# Patient Record
Sex: MALE | Race: WHITE | NOT HISPANIC OR LATINO | Employment: OTHER | URBAN - NONMETROPOLITAN AREA
[De-identification: names, ages, dates, MRNs, and addresses within clinical notes are randomized per-mention and may not be internally consistent; named-entity substitution may affect disease eponyms.]

---

## 2017-01-18 ENCOUNTER — OFFICE VISIT (OUTPATIENT)
Dept: FAMILY MEDICINE CLINIC | Facility: CLINIC | Age: 77
End: 2017-01-18

## 2017-01-18 VITALS
DIASTOLIC BLOOD PRESSURE: 76 MMHG | SYSTOLIC BLOOD PRESSURE: 126 MMHG | WEIGHT: 214.5 LBS | HEIGHT: 69 IN | BODY MASS INDEX: 31.77 KG/M2

## 2017-01-18 DIAGNOSIS — I10 ESSENTIAL HYPERTENSION: Primary | ICD-10-CM

## 2017-01-18 DIAGNOSIS — I25.10 CORONARY ARTERIOSCLEROSIS: ICD-10-CM

## 2017-01-18 LAB
ANION GAP SERPL CALCULATED.3IONS-SCNC: 8 MMOL/L (ref 5–15)
BUN SERPL-MCNC: 24 MG/DL (ref 8–25)
CALCIUM SERPL-MCNC: 9.8 MG/DL (ref 8.4–10.8)
CHLORIDE SERPL-SCNC: 101 MMOL/L (ref 100–112)
CO2 SERPL-SCNC: 26 MMOL/L (ref 20–32)
CREAT SERPL-MCNC: 1.2 MG/DL (ref 0.4–1.3)
GLUCOSE SERPL-MCNC: 100 MG/DL (ref 70–100)
POTASSIUM SERPL-SCNC: 4.2 MMOL/L (ref 3.4–5.4)
SODIUM SERPL-SCNC: 135 MMOL/L (ref 134–146)

## 2017-01-18 PROCEDURE — 99214 OFFICE O/P EST MOD 30 MIN: CPT | Performed by: FAMILY MEDICINE

## 2017-01-18 RX ORDER — ASPIRIN 81 MG/1
81 TABLET ORAL DAILY
COMMUNITY

## 2017-01-18 RX ORDER — ISOSORBIDE MONONITRATE 60 MG/1
60 TABLET, EXTENDED RELEASE ORAL
COMMUNITY
End: 2017-05-15 | Stop reason: SDUPTHER

## 2017-01-18 RX ORDER — ATENOLOL 50 MG/1
50 TABLET ORAL
COMMUNITY
End: 2017-02-14 | Stop reason: SDUPTHER

## 2017-01-18 RX ORDER — ENALAPRIL MALEATE 5 MG/1
5 TABLET ORAL
COMMUNITY
End: 2017-05-18

## 2017-01-18 RX ORDER — SIMVASTATIN 80 MG
80 TABLET ORAL
COMMUNITY
End: 2017-05-15 | Stop reason: SDUPTHER

## 2017-01-18 RX ORDER — PANTOPRAZOLE SODIUM 40 MG/1
40 TABLET, DELAYED RELEASE ORAL NIGHTLY
Refills: 2 | COMMUNITY
Start: 2016-10-24 | End: 2017-07-17 | Stop reason: SDUPTHER

## 2017-01-18 RX ORDER — PRASUGREL 10 MG/1
10 TABLET, FILM COATED ORAL DAILY
COMMUNITY
End: 2017-12-04

## 2017-01-18 RX ORDER — RANOLAZINE 500 MG/1
500 TABLET, EXTENDED RELEASE ORAL
COMMUNITY
End: 2017-02-08 | Stop reason: SDUPTHER

## 2017-01-18 RX ORDER — FAMOTIDINE 40 MG/1
40 TABLET, FILM COATED ORAL NIGHTLY PRN
COMMUNITY
End: 2018-01-08 | Stop reason: SDUPTHER

## 2017-01-18 NOTE — MR AVS SNAPSHOT
Dale Mann   1/18/2017 10:15 AM   Office Visit    Dept Phone:  [phone]   Encounter #:  [id number]    Provider:  Dunia Cummings MD   Department:  [facility]                Your Full Care Plan              Your Updated Medication List          This list is accurate as of: 1/18/17 10:52 AM.  Always use your most recent med list.                aspirin 81 MG EC tablet       atenolol 50 MG tablet   Commonly known as:  TENORMIN       EFFIENT 10 MG tablet   Generic drug:  prasugrel       enalapril 5 MG tablet   Commonly known as:  VASOTEC       famotidine 40 MG tablet   Commonly known as:  PEPCID       isosorbide mononitrate 60 MG 24 hr tablet   Commonly known as:  IMDUR       NIACIN PO       NITROSTAT 0.4 MG SL tablet   Generic drug:  nitroglycerin   PLACE 1 TABLET UNDER TONGUE EVERY 5 MINUTES FOR 3 DOSES AS NEEDED FOR CHEST PAIN. IF NO RELIEF WITH 15 MINUTES-CALL 911 OR GO TO ER       pantoprazole 40 MG EC tablet   Commonly known as:  PROTONIX       RANEXA 500 MG 12 hr tablet   Generic drug:  ranolazine       simvastatin 80 MG tablet   Commonly known as:  ZOCOR               We Performed the Following     Basic Metabolic Panel       You Were Diagnosed With        Codes Comments    Essential hypertension    -  Primary ICD-10-CM: I10  ICD-9-CM: 401.9       Instructions     None    Patient Instructions History      Upcoming Appointments     Visit Type Date Time Department    FOLLOW UP 1/18/2017 10:15 AM W JOSE Baxter Signup     Our records indicate that you have declined [facility] signup. If you would like to sign up for [employer], please email [email or web address] or call [phone] to obtain an activation code.             Other Info from Your Visit           Allergies     Coumadin [Warfarin Sodium]      Lipitor [Atorvastatin]      Plavix [Clopidogrel Bisulfate]        Reason for Visit     Follow-up  "vanderbuilt      Vital Signs     Blood Pressure Height Weight Body Mass Index Smoking Status       126/76 69\" (175.3 cm) 214 lb 8 oz (97.3 kg) 31.68 kg/m2 Never Smoker       Problems and Diagnoses Noted     Chronic anemia    Inflammation of the stomach lining    Hardening of the arteries of the heart    Diverticulosis of sigmoid colon    Difficult or painful urination    High blood pressure    Fever    Hematochezia    History of peptic ulcer    Hyperkalemia    Old myocardial infarction    Other specified anemias    Screening for prostate cancer    Upper respiratory infection    Urinary tract infection        "

## 2017-01-18 NOTE — PROGRESS NOTES
Subjective   Dale Mann is a 76 y.o. male.  He is here for follow-up on cardiac disease.  He came into the office recently with chest pain and was sent to ER and from there was transferred to [facility].  He was kept and seen by cardiology that had to be readmitted and at this point was transferred to [facility] and received a stent in one of his coronary arteries.  He was told that he did have a mild heart attack.  He is feeling better, still has some soreness in the chest and a bit of shortness of breath but is starting to feel like he is getting his energy back and is more able to get around and do things  His blood pressure was running while in the lisinopril was held.  He was supposed to have his renal functions rechecked by me and for us to decide whether he needs to go back on the enalapril at this time.  He still having some low blood pressures and some lightheadedness when he stands up    History of Present Illness     The following portions of the patient's history were reviewed and updated as appropriate: allergies, current medications, past family history, past medical history, past social history, past surgical history and problem list.    Review of Systems   Constitutional: Negative for activity change, appetite change, diaphoresis, fatigue, fever and unexpected weight change.   HENT: Negative for congestion, ear pain, hearing loss, sinus pressure, sore throat, tinnitus, trouble swallowing and voice change.    Eyes: Negative.    Respiratory: Negative.    Cardiovascular: Negative.    Gastrointestinal: Negative for abdominal distention, abdominal pain, blood in stool, constipation, diarrhea, nausea and vomiting.   Endocrine: Negative.    Genitourinary: Negative for decreased urine volume, dysuria, frequency, hematuria and urgency.   Musculoskeletal: Negative.    Skin: Negative.    Allergic/Immunologic: Negative.    Neurological: Negative for dizziness, tremors, seizures, syncope, speech  difficulty, weakness, numbness and headaches.   Hematological: Negative.    Psychiatric/Behavioral: Negative for dysphoric mood and sleep disturbance. The patient is not nervous/anxious.        Objective   Physical Exam   Constitutional: He is oriented to person, place, and time. He appears well-developed and well-nourished. No distress.   HENT:   Head: Normocephalic and atraumatic.   Nose: Nose normal.   Mouth/Throat: Oropharynx is clear and moist.   Eyes: Conjunctivae and EOM are normal. Pupils are equal, round, and reactive to light.   Neck: Normal range of motion. Neck supple. No JVD present. No tracheal deviation present. No thyromegaly present.   Cardiovascular: Normal rate, regular rhythm, normal heart sounds and intact distal pulses.    No murmur heard.  Pulmonary/Chest: Effort normal and breath sounds normal. He has no wheezes. He exhibits no tenderness.   Abdominal: Soft. Bowel sounds are normal. He exhibits no distension and no mass. There is no tenderness.   Musculoskeletal: Normal range of motion. He exhibits no edema or tenderness.   Lymphadenopathy:     He has no cervical adenopathy.   Neurological: He is alert and oriented to person, place, and time. He exhibits normal muscle tone. Coordination normal.   Skin: Skin is warm and dry. No rash noted. No erythema. No pallor.   Psychiatric: He has a normal mood and affect.   Nursing note and vitals reviewed.      Assessment/Plan   Dale was seen today for follow-up.    Diagnoses and all orders for this visit:    Essential hypertension  -     Basic Metabolic Panel    Coronary arteriosclerosis   for the time being I will leave him off the enalapril given his low blood pressures and symptoms and we'll go ahead and check a metabolic panel in case we need to add it back    Follow-up with cardiology, new medication changes are noted

## 2017-02-08 RX ORDER — RANOLAZINE 500 MG/1
500 TABLET, EXTENDED RELEASE ORAL 2 TIMES DAILY
Qty: 60 TABLET | Refills: 5 | Status: SHIPPED | OUTPATIENT
Start: 2017-02-08 | End: 2017-10-02 | Stop reason: SDUPTHER

## 2017-02-14 RX ORDER — ATENOLOL 50 MG/1
TABLET ORAL
Qty: 30 TABLET | Refills: 6 | Status: SHIPPED | OUTPATIENT
Start: 2017-02-14 | End: 2017-05-18

## 2017-04-06 RX ORDER — NIACIN 1000 MG/1
TABLET, EXTENDED RELEASE ORAL
Qty: 90 TABLET | Refills: 2 | Status: SHIPPED | OUTPATIENT
Start: 2017-04-06 | End: 2017-05-18

## 2017-04-18 ENCOUNTER — OFFICE VISIT (OUTPATIENT)
Dept: FAMILY MEDICINE CLINIC | Facility: CLINIC | Age: 77
End: 2017-04-18

## 2017-04-18 ENCOUNTER — LAB (OUTPATIENT)
Dept: LAB | Facility: OTHER | Age: 77
End: 2017-04-18

## 2017-04-18 VITALS
DIASTOLIC BLOOD PRESSURE: 68 MMHG | WEIGHT: 205.2 LBS | SYSTOLIC BLOOD PRESSURE: 124 MMHG | HEIGHT: 69 IN | BODY MASS INDEX: 30.39 KG/M2

## 2017-04-18 DIAGNOSIS — N52.9 ERECTILE DYSFUNCTION, UNSPECIFIED ERECTILE DYSFUNCTION TYPE: Primary | ICD-10-CM

## 2017-04-18 DIAGNOSIS — B37.42 CANDIDAL BALANITIS: ICD-10-CM

## 2017-04-18 DIAGNOSIS — Z12.5 ENCOUNTER FOR SCREENING FOR MALIGNANT NEOPLASM OF PROSTATE: ICD-10-CM

## 2017-04-18 PROCEDURE — 84402 ASSAY OF FREE TESTOSTERONE: CPT | Performed by: FAMILY MEDICINE

## 2017-04-18 PROCEDURE — 84403 ASSAY OF TOTAL TESTOSTERONE: CPT | Performed by: FAMILY MEDICINE

## 2017-04-18 PROCEDURE — G0103 PSA SCREENING: HCPCS | Performed by: FAMILY MEDICINE

## 2017-04-18 PROCEDURE — 36415 COLL VENOUS BLD VENIPUNCTURE: CPT | Performed by: FAMILY MEDICINE

## 2017-04-18 PROCEDURE — 99213 OFFICE O/P EST LOW 20 MIN: CPT | Performed by: FAMILY MEDICINE

## 2017-04-18 RX ORDER — KETOCONAZOLE 20 MG/G
CREAM TOPICAL DAILY
Qty: 60 G | Refills: 5 | Status: SHIPPED | OUTPATIENT
Start: 2017-04-18 | End: 2017-08-29

## 2017-04-18 NOTE — PROGRESS NOTES
Subjective   Dale Mann is a 76 y.o. male.  He is here today concerned about a rash around the penis for the past 5 years or so.  He is uncircumcised and he reports that he tried over-the-counter medicines without success including steroid creams.  It gets cracked and dry peeling and is quite painful at times it seems that it swells.  He says at times it looks very red but he's not noticed any vesicular type lesions.    He also has trouble with erectile dysfunction.  He has trouble with both acquiring and maintaining an erection during sexual activity.    History of Present Illness     The following portions of the patient's history were reviewed and updated as appropriate: allergies, current medications, past family history, past medical history, past social history, past surgical history and problem list.    Review of Systems   Constitutional: Negative for activity change, appetite change, diaphoresis, fatigue, fever and unexpected weight change.   HENT: Negative for congestion, ear pain, hearing loss, sinus pressure, sore throat, tinnitus, trouble swallowing and voice change.    Eyes: Negative.    Respiratory: Negative.    Cardiovascular: Negative.    Gastrointestinal: Negative for abdominal distention, abdominal pain, blood in stool, constipation, diarrhea, nausea and vomiting.   Endocrine: Negative.    Genitourinary: Positive for penile pain and penile swelling. Negative for decreased urine volume, dysuria, frequency, hematuria and urgency.   Musculoskeletal: Negative.    Skin: Positive for rash.   Allergic/Immunologic: Negative.    Neurological: Negative for dizziness, tremors, seizures, syncope, speech difficulty, weakness, numbness and headaches.   Hematological: Negative.    Psychiatric/Behavioral: Negative for dysphoric mood and sleep disturbance. The patient is not nervous/anxious.        Objective   Physical Exam   Constitutional: He is oriented to person, place, and time. He appears  well-developed and well-nourished. No distress.   HENT:   Head: Normocephalic and atraumatic.   Nose: Nose normal.   Mouth/Throat: Oropharynx is clear and moist.   Eyes: Conjunctivae and EOM are normal. Pupils are equal, round, and reactive to light.   Neck: Normal range of motion. Neck supple. No JVD present. No tracheal deviation present. No thyromegaly present.   Cardiovascular: Normal rate, regular rhythm, normal heart sounds and intact distal pulses.    No murmur heard.  Pulmonary/Chest: Effort normal and breath sounds normal. He has no wheezes. He exhibits no tenderness.   Abdominal: Soft. Bowel sounds are normal. He exhibits no distension and no mass. There is no tenderness.   Genitourinary:   Genitourinary Comments: Patient has redness with cracked skin and mild swelling and inflammation at the tip of the penis and between the skin folds of the foreskin.   Musculoskeletal: Normal range of motion. He exhibits no edema or tenderness.   Lymphadenopathy:     He has no cervical adenopathy.   Neurological: He is alert and oriented to person, place, and time. He exhibits normal muscle tone. Coordination normal.   Skin: Skin is warm and dry. No rash noted. There is erythema. No pallor.   Psychiatric: He has a normal mood and affect.   Nursing note and vitals reviewed.      Assessment/Plan   Dale was seen today for rash.    Diagnoses and all orders for this visit:    Erectile dysfunction, unspecified erectile dysfunction type  -     Testosterone, Free, Total    Candidal balanitis  -     Ambulatory Referral to Urology    Encounter for screening for malignant neoplasm of prostate  -     PSA Screen (Medicare- Lab Only); Future    Other orders  -     ketoconazole (NIZORAL) 2 % cream; Apply  topically Daily.     we'll check testosterone levels and follow-up accordingly.  Before prescribing Viagra or similar product may need to speak with his cardiologist as he takes nitroglycerin on a fairly regular basis    We'll  refer to urology to see if he is a candidate for circumcision as he is wanting to see about getting this done and I think this would help with the infections that have been recurrent    Gave Nizoral cream for the balanitis

## 2017-04-19 LAB — PSA SERPL-MCNC: 0.87 NG/ML (ref 0–4)

## 2017-04-21 LAB
CONV COMMENT: ABNORMAL
TESTOST FREE SERPL-MCNC: 6.1 PG/ML (ref 6.6–18.1)
TESTOST SERPL-MCNC: 413 NG/DL (ref 348–1197)

## 2017-04-24 NOTE — PROGRESS NOTES
Please call the patient regarding his abnormal result.  Testosterone is low, see if he would like to start testosterone shots monthly

## 2017-05-04 ENCOUNTER — CLINICAL SUPPORT (OUTPATIENT)
Dept: FAMILY MEDICINE CLINIC | Facility: CLINIC | Age: 77
End: 2017-05-04

## 2017-05-04 DIAGNOSIS — E34.9 TESTOSTERONE DEFICIENCY: ICD-10-CM

## 2017-05-04 DIAGNOSIS — R79.89 DECREASED TESTOSTERONE LEVEL: Primary | ICD-10-CM

## 2017-05-04 PROCEDURE — 96372 THER/PROPH/DIAG INJ SC/IM: CPT | Performed by: FAMILY MEDICINE

## 2017-05-04 RX ORDER — TESTOSTERONE CYPIONATE 200 MG/ML
200 INJECTION, SOLUTION INTRAMUSCULAR
Status: COMPLETED | COMMUNITY
Start: 2017-05-04 | End: 2018-03-02

## 2017-05-04 RX ADMIN — TESTOSTERONE CYPIONATE 200 MG: 200 INJECTION, SOLUTION INTRAMUSCULAR at 15:30

## 2017-05-15 ENCOUNTER — PREP FOR SURGERY (OUTPATIENT)
Dept: UROLOGY | Facility: HOSPITAL | Age: 77
End: 2017-05-15

## 2017-05-15 DIAGNOSIS — N48.1 BALANITIS: Primary | ICD-10-CM

## 2017-05-15 RX ORDER — SIMVASTATIN 80 MG
TABLET ORAL
Qty: 90 TABLET | Refills: 2 | Status: SHIPPED | OUTPATIENT
Start: 2017-05-15 | End: 2017-05-18

## 2017-05-15 RX ORDER — LEVOFLOXACIN 5 MG/ML
500 INJECTION, SOLUTION INTRAVENOUS ONCE
Status: CANCELLED | OUTPATIENT
Start: 2017-05-19

## 2017-05-15 RX ORDER — ISOSORBIDE MONONITRATE 60 MG/1
TABLET, EXTENDED RELEASE ORAL
Qty: 135 TABLET | Refills: 2 | Status: SHIPPED | OUTPATIENT
Start: 2017-05-15 | End: 2017-05-18

## 2017-05-18 ENCOUNTER — APPOINTMENT (OUTPATIENT)
Dept: PREADMISSION TESTING | Facility: HOSPITAL | Age: 77
End: 2017-05-18

## 2017-05-18 VITALS
DIASTOLIC BLOOD PRESSURE: 68 MMHG | HEART RATE: 62 BPM | HEIGHT: 68 IN | BODY MASS INDEX: 31.83 KG/M2 | WEIGHT: 210 LBS | RESPIRATION RATE: 18 BRPM | OXYGEN SATURATION: 95 % | SYSTOLIC BLOOD PRESSURE: 130 MMHG

## 2017-05-18 DIAGNOSIS — N48.1 BALANITIS: ICD-10-CM

## 2017-05-18 LAB
BILIRUB UR QL STRIP: ABNORMAL
CLARITY UR: CLEAR
COLOR UR: ABNORMAL
GLUCOSE UR STRIP-MCNC: NEGATIVE MG/DL
HGB UR QL STRIP.AUTO: NEGATIVE
KETONES UR QL STRIP: NEGATIVE
LEUKOCYTE ESTERASE UR QL STRIP.AUTO: ABNORMAL
NITRITE UR QL STRIP: NEGATIVE
PH UR STRIP.AUTO: 7 [PH] (ref 5–9)
PROT UR QL STRIP: NEGATIVE
SP GR UR STRIP: 1.02 (ref 1–1.03)
UROBILINOGEN UR QL STRIP: ABNORMAL

## 2017-05-18 PROCEDURE — 93010 ELECTROCARDIOGRAM REPORT: CPT | Performed by: INTERNAL MEDICINE

## 2017-05-18 PROCEDURE — 93005 ELECTROCARDIOGRAM TRACING: CPT

## 2017-05-18 PROCEDURE — 81003 URINALYSIS AUTO W/O SCOPE: CPT | Performed by: UROLOGY

## 2017-05-18 RX ORDER — SODIUM CHLORIDE, SODIUM GLUCONATE, SODIUM ACETATE, POTASSIUM CHLORIDE, AND MAGNESIUM CHLORIDE 526; 502; 368; 37; 30 MG/100ML; MG/100ML; MG/100ML; MG/100ML; MG/100ML
1000 INJECTION, SOLUTION INTRAVENOUS CONTINUOUS PRN
Status: CANCELLED | OUTPATIENT
Start: 2017-05-19

## 2017-05-18 RX ORDER — SIMVASTATIN 40 MG
40 TABLET ORAL NIGHTLY
COMMUNITY
End: 2018-10-15 | Stop reason: DRUGHIGH

## 2017-05-18 RX ORDER — ISOSORBIDE MONONITRATE 60 MG/1
60 TABLET, EXTENDED RELEASE ORAL DAILY
COMMUNITY
End: 2018-03-16 | Stop reason: ALTCHOICE

## 2017-05-18 RX ORDER — NITROGLYCERIN 0.4 MG/1
0.4 TABLET SUBLINGUAL
COMMUNITY
End: 2019-03-25 | Stop reason: SDUPTHER

## 2017-05-18 RX ORDER — ATENOLOL 50 MG/1
50 TABLET ORAL DAILY
COMMUNITY
End: 2017-12-04 | Stop reason: ALTCHOICE

## 2017-05-23 ENCOUNTER — ANESTHESIA EVENT (OUTPATIENT)
Dept: PERIOP | Facility: HOSPITAL | Age: 77
End: 2017-05-23

## 2017-05-24 ENCOUNTER — HOSPITAL ENCOUNTER (OUTPATIENT)
Facility: HOSPITAL | Age: 77
Setting detail: HOSPITAL OUTPATIENT SURGERY
Discharge: HOME | End: 2017-05-24
Attending: UROLOGY | Admitting: UROLOGY

## 2017-05-24 ENCOUNTER — ANESTHESIA (OUTPATIENT)
Dept: PERIOP | Facility: HOSPITAL | Age: 77
End: 2017-05-24

## 2017-05-24 VITALS
HEIGHT: 68 IN | HEART RATE: 57 BPM | SYSTOLIC BLOOD PRESSURE: 102 MMHG | BODY MASS INDEX: 32.71 KG/M2 | TEMPERATURE: 97 F | OXYGEN SATURATION: 95 % | RESPIRATION RATE: 18 BRPM | WEIGHT: 215.83 LBS | DIASTOLIC BLOOD PRESSURE: 59 MMHG

## 2017-05-24 DIAGNOSIS — N48.1 BALANITIS: ICD-10-CM

## 2017-05-24 PROCEDURE — 88304 TISSUE EXAM BY PATHOLOGIST: CPT | Performed by: UROLOGY

## 2017-05-24 PROCEDURE — 25010000002 PROPOFOL 10 MG/ML EMULSION: Performed by: NURSE ANESTHETIST, CERTIFIED REGISTERED

## 2017-05-24 PROCEDURE — 25010000002 FENTANYL CITRATE (PF) 100 MCG/2ML SOLUTION: Performed by: NURSE ANESTHETIST, CERTIFIED REGISTERED

## 2017-05-24 PROCEDURE — 25010000002 MIDAZOLAM PER 1 MG: Performed by: NURSE ANESTHETIST, CERTIFIED REGISTERED

## 2017-05-24 PROCEDURE — 88304 TISSUE EXAM BY PATHOLOGIST: CPT | Performed by: PATHOLOGY

## 2017-05-24 PROCEDURE — 25010000003 CEFAZOLIN PER 500 MG: Performed by: NURSE ANESTHETIST, CERTIFIED REGISTERED

## 2017-05-24 RX ORDER — MIDAZOLAM HYDROCHLORIDE 1 MG/ML
INJECTION INTRAMUSCULAR; INTRAVENOUS AS NEEDED
Status: DISCONTINUED | OUTPATIENT
Start: 2017-05-24 | End: 2017-05-24 | Stop reason: SURG

## 2017-05-24 RX ORDER — LIDOCAINE HYDROCHLORIDE 20 MG/ML
INJECTION, SOLUTION INFILTRATION; PERINEURAL AS NEEDED
Status: DISCONTINUED | OUTPATIENT
Start: 2017-05-24 | End: 2017-05-24 | Stop reason: SURG

## 2017-05-24 RX ORDER — ACETAMINOPHEN 650 MG/1
650 SUPPOSITORY RECTAL ONCE AS NEEDED
Status: DISCONTINUED | OUTPATIENT
Start: 2017-05-24 | End: 2017-05-24 | Stop reason: HOSPADM

## 2017-05-24 RX ORDER — ACETAMINOPHEN 325 MG/1
650 TABLET ORAL ONCE AS NEEDED
Status: DISCONTINUED | OUTPATIENT
Start: 2017-05-24 | End: 2017-05-24 | Stop reason: HOSPADM

## 2017-05-24 RX ORDER — PROPOFOL 10 MG/ML
VIAL (ML) INTRAVENOUS AS NEEDED
Status: DISCONTINUED | OUTPATIENT
Start: 2017-05-24 | End: 2017-05-24 | Stop reason: SURG

## 2017-05-24 RX ORDER — LABETALOL HYDROCHLORIDE 5 MG/ML
5 INJECTION, SOLUTION INTRAVENOUS
Status: DISCONTINUED | OUTPATIENT
Start: 2017-05-24 | End: 2017-05-24 | Stop reason: HOSPADM

## 2017-05-24 RX ORDER — NALOXONE HCL 0.4 MG/ML
0.2 VIAL (ML) INJECTION AS NEEDED
Status: DISCONTINUED | OUTPATIENT
Start: 2017-05-24 | End: 2017-05-24 | Stop reason: HOSPADM

## 2017-05-24 RX ORDER — FENTANYL CITRATE 50 UG/ML
INJECTION, SOLUTION INTRAMUSCULAR; INTRAVENOUS AS NEEDED
Status: DISCONTINUED | OUTPATIENT
Start: 2017-05-24 | End: 2017-05-24 | Stop reason: SURG

## 2017-05-24 RX ORDER — CEFAZOLIN SODIUM 1 G/3ML
INJECTION, POWDER, FOR SOLUTION INTRAMUSCULAR; INTRAVENOUS AS NEEDED
Status: DISCONTINUED | OUTPATIENT
Start: 2017-05-24 | End: 2017-05-24 | Stop reason: SURG

## 2017-05-24 RX ORDER — ONDANSETRON 2 MG/ML
4 INJECTION INTRAMUSCULAR; INTRAVENOUS ONCE AS NEEDED
Status: DISCONTINUED | OUTPATIENT
Start: 2017-05-24 | End: 2017-05-24 | Stop reason: HOSPADM

## 2017-05-24 RX ORDER — FLUMAZENIL 0.1 MG/ML
0.2 INJECTION INTRAVENOUS AS NEEDED
Status: DISCONTINUED | OUTPATIENT
Start: 2017-05-24 | End: 2017-05-24 | Stop reason: HOSPADM

## 2017-05-24 RX ORDER — LEVOFLOXACIN 5 MG/ML
500 INJECTION, SOLUTION INTRAVENOUS ONCE
Status: DISCONTINUED | OUTPATIENT
Start: 2017-05-24 | End: 2017-05-24 | Stop reason: HOSPADM

## 2017-05-24 RX ORDER — DOXYCYCLINE HYCLATE 100 MG/1
100 CAPSULE ORAL DAILY
Qty: 7 CAPSULE | Refills: 0 | Status: SHIPPED | OUTPATIENT
Start: 2017-05-24 | End: 2017-05-31

## 2017-05-24 RX ORDER — SODIUM CHLORIDE, SODIUM GLUCONATE, SODIUM ACETATE, POTASSIUM CHLORIDE, AND MAGNESIUM CHLORIDE 526; 502; 368; 37; 30 MG/100ML; MG/100ML; MG/100ML; MG/100ML; MG/100ML
1000 INJECTION, SOLUTION INTRAVENOUS CONTINUOUS PRN
Status: DISCONTINUED | OUTPATIENT
Start: 2017-05-24 | End: 2017-05-24 | Stop reason: HOSPADM

## 2017-05-24 RX ORDER — DIPHENHYDRAMINE HYDROCHLORIDE 50 MG/ML
12.5 INJECTION INTRAMUSCULAR; INTRAVENOUS
Status: DISCONTINUED | OUTPATIENT
Start: 2017-05-24 | End: 2017-05-24 | Stop reason: HOSPADM

## 2017-05-24 RX ORDER — OXYCODONE AND ACETAMINOPHEN 7.5; 325 MG/1; MG/1
1-2 TABLET ORAL EVERY 4 HOURS PRN
Qty: 15 TABLET | Refills: 0 | Status: SHIPPED | OUTPATIENT
Start: 2017-05-24 | End: 2017-08-29

## 2017-05-24 RX ADMIN — PROPOFOL 100 MG: 10 INJECTION, EMULSION INTRAVENOUS at 16:52

## 2017-05-24 RX ADMIN — EPHEDRINE SULFATE 10 MG: 50 INJECTION INTRAMUSCULAR; INTRAVENOUS; SUBCUTANEOUS at 17:08

## 2017-05-24 RX ADMIN — EPHEDRINE SULFATE 10 MG: 50 INJECTION INTRAMUSCULAR; INTRAVENOUS; SUBCUTANEOUS at 17:05

## 2017-05-24 RX ADMIN — SODIUM CHLORIDE, SODIUM GLUCONATE, SODIUM ACETATE, POTASSIUM CHLORIDE, AND MAGNESIUM CHLORIDE 1000 ML: 526; 502; 368; 37; 30 INJECTION, SOLUTION INTRAVENOUS at 13:54

## 2017-05-24 RX ADMIN — LIDOCAINE HYDROCHLORIDE 80 MG: 20 INJECTION, SOLUTION INFILTRATION; PERINEURAL at 16:52

## 2017-05-24 RX ADMIN — FENTANYL CITRATE 50 MCG: 50 INJECTION, SOLUTION INTRAMUSCULAR; INTRAVENOUS at 16:52

## 2017-05-24 RX ADMIN — MIDAZOLAM 2 MG: 1 INJECTION INTRAMUSCULAR; INTRAVENOUS at 16:43

## 2017-05-24 RX ADMIN — CEFAZOLIN SODIUM 1 G: 1 INJECTION, POWDER, FOR SOLUTION INTRAMUSCULAR; INTRAVENOUS at 17:10

## 2017-05-24 RX ADMIN — FENTANYL CITRATE 50 MCG: 50 INJECTION, SOLUTION INTRAMUSCULAR; INTRAVENOUS at 17:10

## 2017-05-26 LAB
LAB AP CASE REPORT: NORMAL
Lab: NORMAL
PATH REPORT.FINAL DX SPEC: NORMAL
PATH REPORT.GROSS SPEC: NORMAL

## 2017-06-05 ENCOUNTER — CLINICAL SUPPORT (OUTPATIENT)
Dept: FAMILY MEDICINE CLINIC | Facility: CLINIC | Age: 77
End: 2017-06-05

## 2017-06-05 DIAGNOSIS — R79.89 DECREASED TESTOSTERONE LEVEL: Primary | ICD-10-CM

## 2017-06-05 PROCEDURE — 96372 THER/PROPH/DIAG INJ SC/IM: CPT | Performed by: FAMILY MEDICINE

## 2017-06-05 RX ADMIN — TESTOSTERONE CYPIONATE 200 MG: 200 INJECTION, SOLUTION INTRAMUSCULAR at 14:50

## 2017-07-03 ENCOUNTER — CLINICAL SUPPORT (OUTPATIENT)
Dept: FAMILY MEDICINE CLINIC | Facility: CLINIC | Age: 77
End: 2017-07-03

## 2017-07-03 DIAGNOSIS — R79.89 DECREASED TESTOSTERONE LEVEL: Primary | ICD-10-CM

## 2017-07-03 PROCEDURE — 96372 THER/PROPH/DIAG INJ SC/IM: CPT | Performed by: FAMILY MEDICINE

## 2017-07-03 RX ADMIN — TESTOSTERONE CYPIONATE 200 MG: 200 INJECTION, SOLUTION INTRAMUSCULAR at 15:04

## 2017-07-17 RX ORDER — PANTOPRAZOLE SODIUM 40 MG/1
TABLET, DELAYED RELEASE ORAL
Qty: 90 TABLET | Refills: 1 | Status: SHIPPED | OUTPATIENT
Start: 2017-07-17 | End: 2017-10-16 | Stop reason: SDUPTHER

## 2017-08-02 ENCOUNTER — CLINICAL SUPPORT (OUTPATIENT)
Dept: FAMILY MEDICINE CLINIC | Facility: CLINIC | Age: 77
End: 2017-08-02

## 2017-08-02 DIAGNOSIS — R79.89 DECREASED TESTOSTERONE LEVEL: Primary | ICD-10-CM

## 2017-08-02 PROCEDURE — 96372 THER/PROPH/DIAG INJ SC/IM: CPT | Performed by: FAMILY MEDICINE

## 2017-08-02 RX ADMIN — TESTOSTERONE CYPIONATE 200 MG: 200 INJECTION, SOLUTION INTRAMUSCULAR at 13:25

## 2017-08-14 ENCOUNTER — TRANSCRIBE ORDERS (OUTPATIENT)
Dept: CARDIOLOGY | Facility: CLINIC | Age: 77
End: 2017-08-14

## 2017-08-14 ENCOUNTER — TRANSCRIBE ORDERS (OUTPATIENT)
Dept: LAB | Facility: OTHER | Age: 77
End: 2017-08-14

## 2017-08-14 ENCOUNTER — LAB (OUTPATIENT)
Dept: LAB | Facility: OTHER | Age: 77
End: 2017-08-14

## 2017-08-14 ENCOUNTER — TRANSCRIBE ORDERS (OUTPATIENT)
Dept: GENERAL RADIOLOGY | Facility: CLINIC | Age: 77
End: 2017-08-14

## 2017-08-14 DIAGNOSIS — R07.9 CHEST PAIN, UNSPECIFIED: Primary | ICD-10-CM

## 2017-08-14 DIAGNOSIS — Z79.899 POLYPHARMACY: ICD-10-CM

## 2017-08-14 DIAGNOSIS — Z79.899 POLYPHARMACY: Primary | ICD-10-CM

## 2017-08-14 DIAGNOSIS — R07.9 CHEST PAIN, UNSPECIFIED TYPE: Primary | ICD-10-CM

## 2017-08-14 LAB
ALBUMIN SERPL-MCNC: 3.7 G/DL (ref 3.2–5.5)
ALP SERPL-CCNC: 58 U/L (ref 15–121)
ALT SERPL W P-5'-P-CCNC: 12 U/L (ref 10–60)
ANION GAP SERPL CALCULATED.3IONS-SCNC: 11 MMOL/L (ref 5–15)
AST SERPL-CCNC: 19 U/L (ref 10–60)
BASOPHILS # BLD AUTO: 0.01 10*3/MM3 (ref 0–0.2)
BASOPHILS NFR BLD AUTO: 0.2 % (ref 0–2)
BILIRUB CONJ SERPL-MCNC: 0.2 MG/DL (ref 0–0.1)
BILIRUB INDIRECT SERPL-MCNC: 0.8 MG/DL
BILIRUB SERPL-MCNC: 1 MG/DL (ref 0.2–1)
BUN BLD-MCNC: 17 MG/DL (ref 8–25)
BUN/CREAT SERPL: 14.2 (ref 7–25)
CALCIUM SPEC-SCNC: 8.9 MG/DL (ref 8.4–10.8)
CHLORIDE SERPL-SCNC: 102 MMOL/L (ref 100–112)
CHOLEST SERPL-MCNC: 111 MG/DL (ref 150–200)
CK SERPL-CCNC: 84 U/L (ref 26–140)
CO2 SERPL-SCNC: 25 MMOL/L (ref 20–32)
CREAT BLD-MCNC: 1.2 MG/DL (ref 0.4–1.3)
DEPRECATED RDW RBC AUTO: 47.4 FL (ref 35.1–43.9)
EOSINOPHIL # BLD AUTO: 0.4 10*3/MM3 (ref 0–0.7)
EOSINOPHIL NFR BLD AUTO: 6.8 % (ref 0–7)
ERYTHROCYTE [DISTWIDTH] IN BLOOD BY AUTOMATED COUNT: 14.3 % (ref 11.5–14.5)
GFR SERPL CREATININE-BSD FRML MDRD: 59 ML/MIN/1.73 (ref 42–98)
GLUCOSE BLD-MCNC: 104 MG/DL (ref 70–100)
HCT VFR BLD AUTO: 40.2 % (ref 39–49)
HDLC SERPL-MCNC: 41 MG/DL (ref 35–100)
HGB BLD-MCNC: 13.8 G/DL (ref 13.7–17.3)
LDLC SERPL CALC-MCNC: 54 MG/DL
LDLC/HDLC SERPL: 1.31 {RATIO}
LYMPHOCYTES # BLD AUTO: 1.56 10*3/MM3 (ref 0.6–4.2)
LYMPHOCYTES NFR BLD AUTO: 26.4 % (ref 10–50)
MCH RBC QN AUTO: 32.9 PG (ref 26.5–34)
MCHC RBC AUTO-ENTMCNC: 34.3 G/DL (ref 31.5–36.3)
MCV RBC AUTO: 95.9 FL (ref 80–98)
MONOCYTES # BLD AUTO: 0.7 10*3/MM3 (ref 0–0.9)
MONOCYTES NFR BLD AUTO: 11.9 % (ref 0–12)
NEUTROPHILS # BLD AUTO: 3.23 10*3/MM3 (ref 2–8.6)
NEUTROPHILS NFR BLD AUTO: 54.7 % (ref 37–80)
PLATELET # BLD AUTO: 132 10*3/MM3 (ref 150–450)
PMV BLD AUTO: 8.9 FL (ref 8–12)
POTASSIUM BLD-SCNC: 4.2 MMOL/L (ref 3.4–5.4)
PROT SERPL-MCNC: 7 G/DL (ref 6.7–8.2)
RBC # BLD AUTO: 4.19 10*6/MM3 (ref 4.37–5.74)
SODIUM BLD-SCNC: 138 MMOL/L (ref 134–146)
TRIGL SERPL-MCNC: 82 MG/DL (ref 35–160)
VLDLC SERPL-MCNC: 16.4 MG/DL
WBC NRBC COR # BLD: 5.9 10*3/MM3 (ref 3.2–9.8)

## 2017-08-14 PROCEDURE — 80048 BASIC METABOLIC PNL TOTAL CA: CPT | Performed by: INTERNAL MEDICINE

## 2017-08-14 PROCEDURE — 36415 COLL VENOUS BLD VENIPUNCTURE: CPT | Performed by: INTERNAL MEDICINE

## 2017-08-14 PROCEDURE — 80061 LIPID PANEL: CPT | Performed by: INTERNAL MEDICINE

## 2017-08-14 PROCEDURE — 80076 HEPATIC FUNCTION PANEL: CPT | Performed by: INTERNAL MEDICINE

## 2017-08-14 PROCEDURE — 82550 ASSAY OF CK (CPK): CPT | Performed by: INTERNAL MEDICINE

## 2017-08-14 PROCEDURE — 85025 COMPLETE CBC W/AUTO DIFF WBC: CPT | Performed by: INTERNAL MEDICINE

## 2017-08-29 ENCOUNTER — OFFICE VISIT (OUTPATIENT)
Dept: FAMILY MEDICINE CLINIC | Facility: CLINIC | Age: 77
End: 2017-08-29

## 2017-08-29 ENCOUNTER — LAB (OUTPATIENT)
Dept: LAB | Facility: OTHER | Age: 77
End: 2017-08-29

## 2017-08-29 VITALS
WEIGHT: 215 LBS | BODY MASS INDEX: 32.58 KG/M2 | SYSTOLIC BLOOD PRESSURE: 122 MMHG | HEIGHT: 68 IN | DIASTOLIC BLOOD PRESSURE: 68 MMHG

## 2017-08-29 DIAGNOSIS — I25.10 CORONARY ARTERIOSCLEROSIS: ICD-10-CM

## 2017-08-29 DIAGNOSIS — I25.10 CORONARY ARTERIOSCLEROSIS: Primary | ICD-10-CM

## 2017-08-29 DIAGNOSIS — I10 ESSENTIAL HYPERTENSION: ICD-10-CM

## 2017-08-29 LAB
ANION GAP SERPL CALCULATED.3IONS-SCNC: 11 MMOL/L (ref 5–15)
BUN BLD-MCNC: 29 MG/DL (ref 8–25)
BUN/CREAT SERPL: 19.3 (ref 7–25)
CALCIUM SPEC-SCNC: 9.5 MG/DL (ref 8.4–10.8)
CHLORIDE SERPL-SCNC: 98 MMOL/L (ref 100–112)
CO2 SERPL-SCNC: 30 MMOL/L (ref 20–32)
CREAT BLD-MCNC: 1.5 MG/DL (ref 0.4–1.3)
GFR SERPL CREATININE-BSD FRML MDRD: 46 ML/MIN/1.73 (ref 42–98)
GLUCOSE BLD-MCNC: 112 MG/DL (ref 70–100)
POTASSIUM BLD-SCNC: 4.7 MMOL/L (ref 3.4–5.4)
SODIUM BLD-SCNC: 139 MMOL/L (ref 134–146)

## 2017-08-29 PROCEDURE — 36415 COLL VENOUS BLD VENIPUNCTURE: CPT | Performed by: FAMILY MEDICINE

## 2017-08-29 PROCEDURE — 80048 BASIC METABOLIC PNL TOTAL CA: CPT | Performed by: FAMILY MEDICINE

## 2017-08-29 PROCEDURE — 99214 OFFICE O/P EST MOD 30 MIN: CPT | Performed by: FAMILY MEDICINE

## 2017-08-29 RX ORDER — POTASSIUM CHLORIDE 1500 MG/1
20 TABLET, FILM COATED, EXTENDED RELEASE ORAL DAILY
COMMUNITY
End: 2017-12-28 | Stop reason: SDUPTHER

## 2017-08-29 RX ORDER — FUROSEMIDE 40 MG/1
40 TABLET ORAL DAILY
COMMUNITY
End: 2017-12-28 | Stop reason: DRUGHIGH

## 2017-08-29 NOTE — PROGRESS NOTES
Subjective   Dale Mann is a 76 y.o. male who presents to the office for follow-up.  He recently had some cardiac intervention, namely had a coronary arteriogram, and his potassium was low while he was hospitalized.  He was told to recheck with me today.  He's feeling better and has follow-up scheduled with his cardiologist.    History of Present Illness     The following portions of the patient's history were reviewed and updated as appropriate: allergies, current medications, past family history, past medical history, past social history, past surgical history and problem list.    Review of Systems   Constitutional: Negative for activity change, appetite change, diaphoresis, fatigue, fever and unexpected weight change.   HENT: Negative for congestion, ear pain, hearing loss, sinus pressure, sore throat, tinnitus, trouble swallowing and voice change.    Eyes: Negative.    Respiratory: Negative.    Cardiovascular: Negative.    Gastrointestinal: Negative for abdominal distention, abdominal pain, blood in stool, constipation, diarrhea, nausea and vomiting.   Endocrine: Negative.    Genitourinary: Positive for penile pain and penile swelling. Negative for decreased urine volume, dysuria, frequency, hematuria and urgency.   Musculoskeletal: Negative.    Skin: Positive for rash.   Allergic/Immunologic: Negative.    Neurological: Negative for dizziness, tremors, seizures, syncope, speech difficulty, weakness, numbness and headaches.   Hematological: Negative.    Psychiatric/Behavioral: Negative for dysphoric mood and sleep disturbance. The patient is not nervous/anxious.    All other systems reviewed and are negative.      Objective   Physical Exam   Constitutional: He is oriented to person, place, and time. He appears well-developed and well-nourished. No distress.   HENT:   Head: Normocephalic and atraumatic.   Nose: Nose normal.   Mouth/Throat: Oropharynx is clear and moist.   Eyes: Conjunctivae and EOM are  normal. Pupils are equal, round, and reactive to light.   Neck: Normal range of motion. Neck supple. No JVD present. No tracheal deviation present. No thyromegaly present.   Cardiovascular: Normal rate, regular rhythm, normal heart sounds and intact distal pulses.    No murmur heard.  Pulmonary/Chest: Effort normal and breath sounds normal. He has no wheezes. He exhibits no tenderness.   Abdominal: Soft. Bowel sounds are normal. He exhibits no distension and no mass. There is no tenderness.   Musculoskeletal: Normal range of motion. He exhibits no edema or tenderness.   Lymphadenopathy:     He has no cervical adenopathy.   Neurological: He is alert and oriented to person, place, and time. He exhibits normal muscle tone. Coordination normal.   Skin: Skin is warm and dry. No rash noted. There is erythema. No pallor.   Psychiatric: He has a normal mood and affect.   Nursing note and vitals reviewed.      Assessment/Plan   Dale was seen today for follow-up.    Diagnoses and all orders for this visit:    Coronary arteriosclerosis  -     Basic Metabolic Panel; Future  -     Basic Metabolic Panel; Future    Essential hypertension  -     Basic Metabolic Panel; Future     continue current cardiac meds, and we'll check a basic metabolic panel today.     Follow up with Dr. Walker, his cardiologist, in October    I'm awaiting the reports from the arteriogram

## 2017-09-05 ENCOUNTER — CLINICAL SUPPORT (OUTPATIENT)
Dept: FAMILY MEDICINE CLINIC | Facility: CLINIC | Age: 77
End: 2017-09-05

## 2017-09-05 DIAGNOSIS — R79.89 DECREASED TESTOSTERONE LEVEL: Primary | ICD-10-CM

## 2017-09-05 DIAGNOSIS — I10 ESSENTIAL HYPERTENSION: Primary | ICD-10-CM

## 2017-09-05 PROCEDURE — 96372 THER/PROPH/DIAG INJ SC/IM: CPT | Performed by: FAMILY MEDICINE

## 2017-09-05 RX ADMIN — TESTOSTERONE CYPIONATE 200 MG: 200 INJECTION, SOLUTION INTRAMUSCULAR at 10:15

## 2017-09-11 DIAGNOSIS — K46.9 HERNIA: Primary | ICD-10-CM

## 2017-09-19 ENCOUNTER — LAB (OUTPATIENT)
Dept: LAB | Facility: OTHER | Age: 77
End: 2017-09-19

## 2017-09-19 DIAGNOSIS — I10 ESSENTIAL HYPERTENSION: ICD-10-CM

## 2017-09-19 LAB
ANION GAP SERPL CALCULATED.3IONS-SCNC: 9 MMOL/L (ref 5–15)
BUN BLD-MCNC: 20 MG/DL (ref 8–25)
BUN/CREAT SERPL: 16.7 (ref 7–25)
CALCIUM SPEC-SCNC: 9.1 MG/DL (ref 8.4–10.8)
CHLORIDE SERPL-SCNC: 102 MMOL/L (ref 100–112)
CO2 SERPL-SCNC: 28 MMOL/L (ref 20–32)
CREAT BLD-MCNC: 1.2 MG/DL (ref 0.4–1.3)
GFR SERPL CREATININE-BSD FRML MDRD: 59 ML/MIN/1.73 (ref 42–98)
GLUCOSE BLD-MCNC: 102 MG/DL (ref 70–100)
POTASSIUM BLD-SCNC: 4.4 MMOL/L (ref 3.4–5.4)
SODIUM BLD-SCNC: 139 MMOL/L (ref 134–146)

## 2017-09-19 PROCEDURE — 80048 BASIC METABOLIC PNL TOTAL CA: CPT | Performed by: FAMILY MEDICINE

## 2017-09-19 PROCEDURE — 36415 COLL VENOUS BLD VENIPUNCTURE: CPT | Performed by: FAMILY MEDICINE

## 2017-09-28 ENCOUNTER — CONSULT (OUTPATIENT)
Dept: SURGERY | Facility: CLINIC | Age: 77
End: 2017-09-28

## 2017-09-28 VITALS
BODY MASS INDEX: 31.52 KG/M2 | WEIGHT: 208 LBS | DIASTOLIC BLOOD PRESSURE: 70 MMHG | HEIGHT: 68 IN | SYSTOLIC BLOOD PRESSURE: 118 MMHG

## 2017-09-28 DIAGNOSIS — K40.90 LEFT INGUINAL HERNIA: Primary | ICD-10-CM

## 2017-09-28 PROCEDURE — 99203 OFFICE O/P NEW LOW 30 MIN: CPT | Performed by: SURGERY

## 2017-09-28 RX ORDER — HYDROCODONE BITARTRATE AND ACETAMINOPHEN 5; 325 MG/1; MG/1
1 TABLET ORAL AS NEEDED
COMMUNITY
Start: 2017-08-29 | End: 2017-12-04

## 2017-10-02 ENCOUNTER — CLINICAL SUPPORT (OUTPATIENT)
Dept: FAMILY MEDICINE CLINIC | Facility: CLINIC | Age: 77
End: 2017-10-02

## 2017-10-02 DIAGNOSIS — R79.89 DECREASED TESTOSTERONE LEVEL: Primary | ICD-10-CM

## 2017-10-02 PROCEDURE — 96372 THER/PROPH/DIAG INJ SC/IM: CPT | Performed by: FAMILY MEDICINE

## 2017-10-02 RX ORDER — RANOLAZINE 500 MG/1
TABLET, FILM COATED, EXTENDED RELEASE ORAL
Qty: 60 TABLET | Refills: 5 | Status: SHIPPED | OUTPATIENT
Start: 2017-10-02 | End: 2017-12-04 | Stop reason: SDUPTHER

## 2017-10-02 RX ORDER — ATENOLOL 50 MG/1
TABLET ORAL
Qty: 30 TABLET | Refills: 6 | Status: SHIPPED | OUTPATIENT
Start: 2017-10-02 | End: 2017-12-04 | Stop reason: ALTCHOICE

## 2017-10-16 ENCOUNTER — FLU SHOT (OUTPATIENT)
Dept: FAMILY MEDICINE CLINIC | Facility: CLINIC | Age: 77
End: 2017-10-16

## 2017-10-16 PROCEDURE — G0008 ADMIN INFLUENZA VIRUS VAC: HCPCS | Performed by: FAMILY MEDICINE

## 2017-10-16 PROCEDURE — 90662 IIV NO PRSV INCREASED AG IM: CPT | Performed by: FAMILY MEDICINE

## 2017-10-17 RX ORDER — PANTOPRAZOLE SODIUM 40 MG/1
TABLET, DELAYED RELEASE ORAL
Qty: 90 TABLET | Refills: 1 | Status: SHIPPED | OUTPATIENT
Start: 2017-10-17 | End: 2017-12-04 | Stop reason: SDUPTHER

## 2017-10-24 RX ADMIN — TESTOSTERONE CYPIONATE 200 MG: 200 INJECTION, SOLUTION INTRAMUSCULAR at 08:20

## 2017-11-08 ENCOUNTER — CLINICAL SUPPORT (OUTPATIENT)
Dept: FAMILY MEDICINE CLINIC | Facility: CLINIC | Age: 77
End: 2017-11-08

## 2017-11-08 DIAGNOSIS — R79.89 DECREASED TESTOSTERONE LEVEL: Primary | ICD-10-CM

## 2017-11-08 DIAGNOSIS — Z23 PNEUMOCOCCAL VACCINATION ADMINISTERED AT CURRENT VISIT: ICD-10-CM

## 2017-11-08 PROCEDURE — G0009 ADMIN PNEUMOCOCCAL VACCINE: HCPCS | Performed by: FAMILY MEDICINE

## 2017-11-08 PROCEDURE — 90670 PCV13 VACCINE IM: CPT | Performed by: FAMILY MEDICINE

## 2017-11-08 PROCEDURE — 96372 THER/PROPH/DIAG INJ SC/IM: CPT | Performed by: FAMILY MEDICINE

## 2017-11-20 NOTE — PROGRESS NOTES
CHIEF COMPLAINT:    Left Inguinal Hernia    HISTORY OF PRESENT ILLNESS:    Dale Mann is a 77 y.o. male who is referred for evaluation of a left groin hernia.  He initially noted a bulge in the left groin 1-2 years ago.  He states that it is enlarged in size since that time.  He occasionally has a dull aching pain in this area, particularly after activity.  This pain appears to be alleviated by resting.  He notes no obstructive symptoms from this.    Currently he is on Effient as prescribed by his cardiologist Dr. Walker.    Past Medical History:   Diagnosis Date   • Acid reflux    • Arthritis    • Chronic anemia    • Chronic gastritis    • Coronary arteriosclerosis     hx of CABG then stent placement,  is followed by dr pickard in [place]   • Diverticulosis of sigmoid colon    • Dysuria    • Essential hypertension    • Fever    • Hematochezia    • High cholesterol    • History of peptic ulcer    • Hyperkalemia    • Macular degeneration     left eye only   • Old myocardial infarction    • Other specified anemias    • Screening for malignant neoplasm of prostate    • Upper respiratory infection    • Urinary tract infectious disease        Past Surgical History:   Procedure Laterality Date   • 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 A single polyp was found in the sigmoid colon. Removed by cold biopsy polypectomy. Internal and external hemorrhoids found.   • CORONARY ANGIOPLASTY  2007   • CORONARY ARTERY BYPASS GRAFT     • PHALLOPLASTY, CIRCUMCISION N/A 5/24/2017    Procedure: CIRCUMCISION;  Surgeon: Dale Lezama MD;  Location: [facility];  Service:    • UPPER GASTROINTESTINAL ENDOSCOPY  01/14/2015    Normal esophagus. Gastritis was found in the stomach. Biopsy taken. Duodenitis was found in the duodenum. Biopsy  taken.       Prior to Admission medications    Medication Sig Start Date End Date Taking? Authorizing Provider   aspirin 81 MG EC tablet Take 81 mg by mouth Daily.   Yes Historical Provider, MD   atenolol (TENORMIN) 50 MG tablet Take 50 mg by mouth Daily.   Yes Historical Provider, MD   famotidine (PEPCID) 40 MG tablet Take 40 mg by mouth Daily.   Yes Historical Provider, MD   furosemide (LASIX) 40 MG tablet Take 40 mg by mouth Daily.   Yes Historical Provider, MD   isosorbide mononitrate (IMDUR) 60 MG 24 hr tablet Take 60 mg by mouth Daily.   Yes Historical Provider, MD   NIACIN PO Take 1,000 mg by mouth Every Night.   Yes Historical Provider, MD   nitroglycerin (NITROSTAT) 0.4 MG SL tablet Place 0.4 mg under the tongue Every 5 (Five) Minutes As Needed for Chest Pain. Take no more than 3 doses in 15 minutes.   Yes Historical Provider, MD   polyethyl glycol-propyl glycol (SYSTANE) 0.4-0.3 % solution ophthalmic solution Administer 2 drops to both eyes Every 1 (One) Hour As Needed.   Yes Historical Provider, MD   potassium chloride ER (K-TAB) 20 MEQ tablet controlled-release ER tablet Take 20 mEq by mouth Daily.   Yes Historical Provider, MD   prasugrel (EFFIENT) 10 MG tablet Take 10 mg by mouth Daily.   Yes Historical Provider, MD   simvastatin (ZOCOR) 40 MG tablet Take 40 mg by mouth Every Night.   Yes Historical Provider, MD   atenolol (TENORMIN) 50 MG tablet TAKE ONE TABLET BY MOUTH EVERY DAY AS DIRECTED 10/2/17   Dunia Cummings MD   HYDROcodone-acetaminophen (NORCO) 5-325 MG per tablet Take 1 tablet by mouth As Needed. 8/29/17   Historical Provider, MD   pantoprazole (PROTONIX) 40 MG EC tablet TAKE ONE TABLET BY MOUTH EVERY DAY 10/17/17   Dunia Cummings MD   RANEXA 500 MG 12 hr tablet TAKE 1 TABLET BY MOUTH 2 (TWO) TIMES A DAY. 10/2/17   Dunia Cummings MD       Allergies   Allergen Reactions   • Norvasc [Amlodipine Besylate] Anaphylaxis   • Ticagrelor Anaphylaxis   • Coumadin [Warfarin Sodium] Other (See  "Comments)     Excessive bleeding   • Lipitor [Atorvastatin] Other (See Comments)     \"affected movement of heart\"   • Plavix [Clopidogrel Bisulfate] Rash       Family History   Problem Relation Age of Onset   • Colon polyps Other    • Heart disease Other        Social History     Social History   • Marital status:      Spouse name: N/A   • Number of children: N/A   • Years of education: N/A     Occupational History   • Not on file.     Social History Main Topics   • Smoking status: Former Smoker     Quit date: 1987   • Smokeless tobacco: Never Used   • Alcohol use Yes      Comment: socially   • Drug use: No   • Sexual activity: Not on file     Other Topics Concern   • Not on file     Social History Narrative       Review of Systems   Constitutional: Negative for activity change, appetite change, chills and fever.   HENT: Negative for hearing loss, nosebleeds and trouble swallowing.    Cardiovascular: Negative for chest pain, palpitations and leg swelling.   Gastrointestinal: Negative for abdominal distention, abdominal pain, anal bleeding, blood in stool, constipation, diarrhea, nausea, rectal pain and vomiting.   Endocrine: Negative for cold intolerance, heat intolerance, polydipsia and polyuria.   Genitourinary: Positive for frequency. Negative for decreased urine volume, difficulty urinating, dysuria, enuresis, hematuria and urgency.   Musculoskeletal: Positive for arthralgias. Negative for back pain, gait problem, myalgias and neck pain.   Skin: Negative for pallor, rash and wound.   Allergic/Immunologic: Negative for immunocompromised state.   Neurological: Negative for dizziness, seizures, weakness, light-headedness, numbness and headaches.   Psychiatric/Behavioral: Negative for agitation and behavioral problems. The patient is not nervous/anxious.        Objective     /70  Ht 68\" (172.7 cm)  Wt 208 lb (94.3 kg)  BMI 31.63 kg/m2    Physical Exam   Constitutional: He is oriented to person, " place, and time. He appears well-developed and well-nourished.   HENT:   Head: Normocephalic and atraumatic.   Nose: Nose normal.   Eyes: Conjunctivae and EOM are normal. Right eye exhibits no discharge. Left eye exhibits no discharge.   Neck: Trachea normal, normal range of motion and phonation normal. Neck supple. No JVD present. No tracheal deviation and no edema present. No thyromegaly present.   Cardiovascular: Normal rate, regular rhythm and normal heart sounds.  Exam reveals no gallop and no friction rub.    No murmur heard.  Pulmonary/Chest: Effort normal and breath sounds normal. No accessory muscle usage. No respiratory distress. He has no decreased breath sounds. He has no wheezes. He has no rales. He exhibits no tenderness.   Abdominal: Soft. He exhibits no distension, no fluid wave, no ascites, no pulsatile midline mass and no mass. There is no tenderness. There is no rebound and no guarding. A hernia is present. Hernia confirmed positive in the left inguinal area. Hernia confirmed negative in the right inguinal area.   Musculoskeletal: Normal range of motion. He exhibits no edema, tenderness or deformity.   Lymphadenopathy:     He has no cervical adenopathy.        Left: No supraclavicular adenopathy present.   Neurological: He is alert and oriented to person, place, and time. He has normal strength. No cranial nerve deficit.   Skin: Skin is warm and dry. No rash noted. He is not diaphoretic. No erythema. No pallor.   Psychiatric: He has a normal mood and affect. His speech is normal and behavior is normal. Judgment and thought content normal. Cognition and memory are normal.   Vitals reviewed.        ASSESSMENT:    Symptomatic left inguinal hernia    PLAN:    He has a symptomatic left inguinal hernia that he would like to have repaired.  He does have a cardiac history for which she is currently on Effient following a coronary artery bypass as well as stenting.  He is followed by Dr. Denise Sepulveda.   He'll be seeing Dr. Walker soon and we will obtain cardiac clearance and then plan to proceed with repair of his hernia.          This document has been electronically signed by Dale Diehl MD on November 20, 2017 3:06 PM

## 2017-11-28 RX ADMIN — TESTOSTERONE CYPIONATE 200 MG: 200 INJECTION, SOLUTION INTRAMUSCULAR at 12:32

## 2017-11-30 ENCOUNTER — OFFICE VISIT (OUTPATIENT)
Dept: SURGERY | Facility: CLINIC | Age: 77
End: 2017-11-30

## 2017-11-30 VITALS
WEIGHT: 216 LBS | HEIGHT: 68 IN | DIASTOLIC BLOOD PRESSURE: 58 MMHG | BODY MASS INDEX: 32.74 KG/M2 | SYSTOLIC BLOOD PRESSURE: 116 MMHG

## 2017-11-30 DIAGNOSIS — K40.90 LEFT INGUINAL HERNIA: Primary | ICD-10-CM

## 2017-11-30 PROCEDURE — 99212 OFFICE O/P EST SF 10 MIN: CPT | Performed by: SURGERY

## 2017-11-30 RX ORDER — METOPROLOL TARTRATE 50 MG/1
1 TABLET, FILM COATED ORAL EVERY MORNING
COMMUNITY
Start: 2017-11-29 | End: 2018-03-28

## 2017-11-30 RX ORDER — SODIUM CHLORIDE 9 MG/ML
100 INJECTION, SOLUTION INTRAVENOUS CONTINUOUS
Status: CANCELLED | OUTPATIENT
Start: 2017-12-08

## 2017-11-30 RX ORDER — SODIUM CHLORIDE 0.9 % (FLUSH) 0.9 %
1-10 SYRINGE (ML) INJECTION AS NEEDED
Status: CANCELLED | OUTPATIENT
Start: 2017-12-08

## 2017-12-01 ENCOUNTER — CLINICAL SUPPORT (OUTPATIENT)
Dept: FAMILY MEDICINE CLINIC | Facility: CLINIC | Age: 77
End: 2017-12-01

## 2017-12-01 DIAGNOSIS — E34.9 TESTOSTERONE DEFICIENCY: ICD-10-CM

## 2017-12-01 PROCEDURE — 96372 THER/PROPH/DIAG INJ SC/IM: CPT | Performed by: FAMILY MEDICINE

## 2017-12-01 RX ADMIN — TESTOSTERONE CYPIONATE 200 MG: 200 INJECTION, SOLUTION INTRAMUSCULAR at 11:10

## 2017-12-01 RX ADMIN — TESTOSTERONE CYPIONATE 200 MG: 200 INJECTION, SOLUTION INTRAMUSCULAR at 12:02

## 2017-12-04 ENCOUNTER — APPOINTMENT (OUTPATIENT)
Dept: PREADMISSION TESTING | Facility: HOSPITAL | Age: 77
End: 2017-12-04

## 2017-12-04 VITALS
OXYGEN SATURATION: 99 % | HEIGHT: 69 IN | RESPIRATION RATE: 16 BRPM | HEART RATE: 48 BPM | WEIGHT: 222 LBS | BODY MASS INDEX: 32.88 KG/M2 | DIASTOLIC BLOOD PRESSURE: 78 MMHG | SYSTOLIC BLOOD PRESSURE: 130 MMHG

## 2017-12-04 LAB
ANION GAP SERPL CALCULATED.3IONS-SCNC: 9 MMOL/L (ref 5–15)
BUN BLD-MCNC: 18 MG/DL (ref 7–21)
BUN/CREAT SERPL: 15.5 (ref 7–25)
CALCIUM SPEC-SCNC: 9.7 MG/DL (ref 8.4–10.2)
CHLORIDE SERPL-SCNC: 99 MMOL/L (ref 95–110)
CO2 SERPL-SCNC: 31 MMOL/L (ref 22–31)
CREAT BLD-MCNC: 1.16 MG/DL (ref 0.7–1.3)
GFR SERPL CREATININE-BSD FRML MDRD: 61 ML/MIN/1.73 (ref 42–98)
GLUCOSE BLD-MCNC: 90 MG/DL (ref 60–100)
MRSA DNA SPEC QL NAA+PROBE: NEGATIVE
POTASSIUM BLD-SCNC: 4.3 MMOL/L (ref 3.5–5.1)
SODIUM BLD-SCNC: 139 MMOL/L (ref 137–145)

## 2017-12-04 PROCEDURE — 87641 MR-STAPH DNA AMP PROBE: CPT | Performed by: SURGERY

## 2017-12-04 PROCEDURE — 36415 COLL VENOUS BLD VENIPUNCTURE: CPT

## 2017-12-04 PROCEDURE — 80048 BASIC METABOLIC PNL TOTAL CA: CPT | Performed by: ANESTHESIOLOGY

## 2017-12-04 RX ORDER — RANOLAZINE 500 MG/1
500 TABLET, EXTENDED RELEASE ORAL 2 TIMES DAILY
COMMUNITY
End: 2018-03-28

## 2017-12-04 RX ORDER — PANTOPRAZOLE SODIUM 40 MG/1
40 TABLET, DELAYED RELEASE ORAL DAILY
COMMUNITY
End: 2018-09-26 | Stop reason: SDUPTHER

## 2017-12-04 NOTE — DISCHARGE INSTRUCTIONS
[facility]  Pre-op Information and Guidelines    You will be called after 2 p.m. the day before your surgery (Friday for Monday surgery) and notified of your time for arrival and approximate surgery time.  If you have not received a call by 4P.M., please contact Same Day Surgery at [phone] of if outside [facility] call [phone].    Please Follow these Important Safety Guidelines:    • The morning of your procedure, take only the medications listed below with   A sip of water:_____________________________________________       _ISOSORBIDE, METOPROLOL, PANTOPRAZOLE, RANEXA_    • DO NOT eat or drink anything after 12:00 midnight the night before surgery  Specific instructions concerning drinking clear liquids will be discussed during  the pre-surgery instruction call the day before your surgery.    • If you take a blood thinner (ex. Plavix, Coumadin, aspirin), ask your doctor when to stop it before surgery  STOP DATE: _________________    • Only 2 visitors are allowed in patient rooms at a time  Your visitors will be asked to wait in the lobby until the admission process is complete with the exception of a parent with a child and patients in need of special assistance.    • YOU CANNOT DRIVE YOURSELF HOME  You must be accompanied by someone who will be responsible for driving you home after surgery and for your care at home.    • DO NOT chew gum, use breath mints, hard candy, or smoke the day of surgery  • DO NOT drink alcohol for at least 24 hours before your surgery  • DO NOT wear any jewelry and remove all body piercing before coming to the hospital  • DO NOT wear make-up to the hospital  • If you are having surgery on an extremity (arm/leg/foot) remove nail polish/artificial nails on the surgical side  • Clothing, glasses, contacts, dentures, and hairpieces must be removed before surgery  • Bathe the night before or the morning of your surgery and do not use powders/lotions on  skin.

## 2017-12-07 ENCOUNTER — ANESTHESIA EVENT (OUTPATIENT)
Dept: PERIOP | Facility: HOSPITAL | Age: 77
End: 2017-12-07

## 2017-12-08 ENCOUNTER — ANESTHESIA (OUTPATIENT)
Dept: PERIOP | Facility: HOSPITAL | Age: 77
End: 2017-12-08

## 2017-12-08 ENCOUNTER — HOSPITAL ENCOUNTER (OUTPATIENT)
Facility: HOSPITAL | Age: 77
Setting detail: HOSPITAL OUTPATIENT SURGERY
Discharge: HOME | End: 2017-12-08
Attending: SURGERY | Admitting: SURGERY

## 2017-12-08 VITALS
TEMPERATURE: 97.6 F | BODY MASS INDEX: 32.95 KG/M2 | HEIGHT: 69 IN | WEIGHT: 222.44 LBS | DIASTOLIC BLOOD PRESSURE: 61 MMHG | RESPIRATION RATE: 18 BRPM | OXYGEN SATURATION: 93 % | SYSTOLIC BLOOD PRESSURE: 127 MMHG | HEART RATE: 58 BPM

## 2017-12-08 DIAGNOSIS — K40.90 LEFT INGUINAL HERNIA: ICD-10-CM

## 2017-12-08 PROCEDURE — 25010000002 ONDANSETRON PER 1 MG: Performed by: NURSE ANESTHETIST, CERTIFIED REGISTERED

## 2017-12-08 PROCEDURE — 25010000002 MIDAZOLAM PER 1 MG: Performed by: NURSE ANESTHETIST, CERTIFIED REGISTERED

## 2017-12-08 PROCEDURE — 25010000003 CEFAZOLIN PER 500 MG: Performed by: SURGERY

## 2017-12-08 PROCEDURE — 49505 PRP I/HERN INIT REDUC >5 YR: CPT | Performed by: SURGERY

## 2017-12-08 PROCEDURE — 25010000002 PROPOFOL 10 MG/ML EMULSION: Performed by: NURSE ANESTHETIST, CERTIFIED REGISTERED

## 2017-12-08 PROCEDURE — 25010000002 DEXAMETHASONE PER 1 MG: Performed by: NURSE ANESTHETIST, CERTIFIED REGISTERED

## 2017-12-08 PROCEDURE — C1781 MESH (IMPLANTABLE): HCPCS | Performed by: SURGERY

## 2017-12-08 PROCEDURE — 25010000002 FENTANYL CITRATE (PF) 100 MCG/2ML SOLUTION: Performed by: NURSE ANESTHETIST, CERTIFIED REGISTERED

## 2017-12-08 DEVICE — MESH FLUT SHT 3X6IN: Type: IMPLANTABLE DEVICE | Site: GROIN | Status: FUNCTIONAL

## 2017-12-08 RX ORDER — LABETALOL HYDROCHLORIDE 5 MG/ML
5 INJECTION, SOLUTION INTRAVENOUS
Status: DISCONTINUED | OUTPATIENT
Start: 2017-12-08 | End: 2017-12-08 | Stop reason: HOSPADM

## 2017-12-08 RX ORDER — SODIUM CHLORIDE 9 MG/ML
100 INJECTION, SOLUTION INTRAVENOUS CONTINUOUS
Status: DISCONTINUED | OUTPATIENT
Start: 2017-12-08 | End: 2017-12-08 | Stop reason: HOSPADM

## 2017-12-08 RX ORDER — HYDROMORPHONE HCL 110MG/55ML
0.5 PATIENT CONTROLLED ANALGESIA SYRINGE INTRAVENOUS
Status: DISCONTINUED | OUTPATIENT
Start: 2017-12-08 | End: 2017-12-08 | Stop reason: HOSPADM

## 2017-12-08 RX ORDER — DIPHENHYDRAMINE HYDROCHLORIDE 50 MG/ML
12.5 INJECTION INTRAMUSCULAR; INTRAVENOUS
Status: DISCONTINUED | OUTPATIENT
Start: 2017-12-08 | End: 2017-12-08 | Stop reason: HOSPADM

## 2017-12-08 RX ORDER — FLUMAZENIL 0.1 MG/ML
0.2 INJECTION INTRAVENOUS AS NEEDED
Status: DISCONTINUED | OUTPATIENT
Start: 2017-12-08 | End: 2017-12-08 | Stop reason: HOSPADM

## 2017-12-08 RX ORDER — SODIUM CHLORIDE 0.9 % (FLUSH) 0.9 %
1-10 SYRINGE (ML) INJECTION AS NEEDED
Status: DISCONTINUED | OUTPATIENT
Start: 2017-12-08 | End: 2017-12-08 | Stop reason: HOSPADM

## 2017-12-08 RX ORDER — MIDAZOLAM HYDROCHLORIDE 1 MG/ML
INJECTION INTRAMUSCULAR; INTRAVENOUS AS NEEDED
Status: DISCONTINUED | OUTPATIENT
Start: 2017-12-08 | End: 2017-12-08 | Stop reason: SURG

## 2017-12-08 RX ORDER — ACETAMINOPHEN 650 MG/1
650 SUPPOSITORY RECTAL ONCE AS NEEDED
Status: DISCONTINUED | OUTPATIENT
Start: 2017-12-08 | End: 2017-12-08 | Stop reason: HOSPADM

## 2017-12-08 RX ORDER — EPHEDRINE SULFATE 50 MG/ML
5 INJECTION, SOLUTION INTRAVENOUS ONCE AS NEEDED
Status: DISCONTINUED | OUTPATIENT
Start: 2017-12-08 | End: 2017-12-08 | Stop reason: HOSPADM

## 2017-12-08 RX ORDER — FENTANYL CITRATE 50 UG/ML
INJECTION, SOLUTION INTRAMUSCULAR; INTRAVENOUS AS NEEDED
Status: DISCONTINUED | OUTPATIENT
Start: 2017-12-08 | End: 2017-12-08 | Stop reason: SURG

## 2017-12-08 RX ORDER — DEXAMETHASONE SODIUM PHOSPHATE 4 MG/ML
INJECTION, SOLUTION INTRA-ARTICULAR; INTRALESIONAL; INTRAMUSCULAR; INTRAVENOUS; SOFT TISSUE AS NEEDED
Status: DISCONTINUED | OUTPATIENT
Start: 2017-12-08 | End: 2017-12-08 | Stop reason: SURG

## 2017-12-08 RX ORDER — ACETAMINOPHEN 325 MG/1
650 TABLET ORAL ONCE AS NEEDED
Status: DISCONTINUED | OUTPATIENT
Start: 2017-12-08 | End: 2017-12-08 | Stop reason: HOSPADM

## 2017-12-08 RX ORDER — ONDANSETRON 2 MG/ML
4 INJECTION INTRAMUSCULAR; INTRAVENOUS ONCE AS NEEDED
Status: DISCONTINUED | OUTPATIENT
Start: 2017-12-08 | End: 2017-12-08 | Stop reason: HOSPADM

## 2017-12-08 RX ORDER — BUPIVACAINE HYDROCHLORIDE AND EPINEPHRINE 5; 5 MG/ML; UG/ML
INJECTION, SOLUTION EPIDURAL; INTRACAUDAL; PERINEURAL AS NEEDED
Status: DISCONTINUED | OUTPATIENT
Start: 2017-12-08 | End: 2017-12-08 | Stop reason: HOSPADM

## 2017-12-08 RX ORDER — PROPOFOL 10 MG/ML
VIAL (ML) INTRAVENOUS AS NEEDED
Status: DISCONTINUED | OUTPATIENT
Start: 2017-12-08 | End: 2017-12-08 | Stop reason: SURG

## 2017-12-08 RX ORDER — ONDANSETRON 2 MG/ML
INJECTION INTRAMUSCULAR; INTRAVENOUS AS NEEDED
Status: DISCONTINUED | OUTPATIENT
Start: 2017-12-08 | End: 2017-12-08 | Stop reason: SURG

## 2017-12-08 RX ORDER — HYDROCODONE BITARTRATE AND ACETAMINOPHEN 5; 325 MG/1; MG/1
1-2 TABLET ORAL EVERY 4 HOURS PRN
Qty: 30 TABLET | Refills: 0 | Status: SHIPPED | OUTPATIENT
Start: 2017-12-08 | End: 2018-03-16 | Stop reason: ALTCHOICE

## 2017-12-08 RX ORDER — NALOXONE HCL 0.4 MG/ML
0.2 VIAL (ML) INJECTION AS NEEDED
Status: DISCONTINUED | OUTPATIENT
Start: 2017-12-08 | End: 2017-12-08 | Stop reason: HOSPADM

## 2017-12-08 RX ADMIN — WATER 2 G: 1 INJECTION INTRAMUSCULAR; INTRAVENOUS; SUBCUTANEOUS at 08:49

## 2017-12-08 RX ADMIN — FENTANYL CITRATE 25 MCG: 50 INJECTION, SOLUTION INTRAMUSCULAR; INTRAVENOUS at 09:04

## 2017-12-08 RX ADMIN — FENTANYL CITRATE 25 MCG: 50 INJECTION, SOLUTION INTRAMUSCULAR; INTRAVENOUS at 09:19

## 2017-12-08 RX ADMIN — SODIUM CHLORIDE 100 ML/HR: 9 INJECTION, SOLUTION INTRAVENOUS at 08:18

## 2017-12-08 RX ADMIN — FENTANYL CITRATE 50 MCG: 50 INJECTION, SOLUTION INTRAMUSCULAR; INTRAVENOUS at 08:43

## 2017-12-08 RX ADMIN — EPHEDRINE SULFATE 10 MG: 50 INJECTION INTRAMUSCULAR; INTRAVENOUS; SUBCUTANEOUS at 08:50

## 2017-12-08 RX ADMIN — ONDANSETRON 4 MG: 2 INJECTION INTRAMUSCULAR; INTRAVENOUS at 09:36

## 2017-12-08 RX ADMIN — MIDAZOLAM 1 MG: 1 INJECTION INTRAMUSCULAR; INTRAVENOUS at 08:40

## 2017-12-08 RX ADMIN — DEXAMETHASONE SODIUM PHOSPHATE 4 MG: 4 INJECTION, SOLUTION INTRAMUSCULAR; INTRAVENOUS at 08:49

## 2017-12-08 RX ADMIN — PROPOFOL 110 MG: 10 INJECTION, EMULSION INTRAVENOUS at 08:46
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
## 2017-12-08 NOTE — ANESTHESIA PROCEDURE NOTES
Airway  Urgency: elective    Airway not difficult    General Information and Staff    Patient location during procedure: OR  CRNA: VAUGHN FONTAINE    Indications and Patient Condition    Preoxygenated: yes  Mask difficulty assessment: 0 - not attempted    Final Airway Details  Final airway type: supraglottic airway      Successful airway: classic  Size 4    Number of attempts at approach: 1    Additional Comments  Lips and teeth in preanesthetic condition
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
## 2017-12-08 NOTE — ANESTHESIA POSTPROCEDURE EVALUATION
Patient: Dale Mann    Procedure Summary     Date Anesthesia Start Anesthesia Stop Room / Location    12/08/17 0840 0947  [facility] OR 03 / [facility]       Procedure Diagnosis Surgeon Provider    OPEN LEFT INGUINAL HERNIA REPAIR WITH MESH (Left Abdomen) Left inguinal hernia  (Left inguinal hernia [K40.90]) MD Marvin Lorenzo MD          Anesthesia Type: general  Last vitals  BP   130/63 (12/08/17 1033)   Temp   98 °F (36.7 °C) (12/08/17 1033)   Pulse   59 (12/08/17 1033)   Resp   16 (12/08/17 1033)     SpO2   97 % (12/08/17 1033)     Post Anesthesia Care and Evaluation    Patient location during evaluation: PACU  Patient participation: complete - patient participated  Level of consciousness: awake and alert  Pain score: 1  Pain management: adequate  Airway patency: patent  Anesthetic complications: No anesthetic complications  PONV Status: none  Cardiovascular status: acceptable  Respiratory status: acceptable  Hydration status: acceptable

## 2017-12-08 NOTE — BRIEF OP NOTE
INGUINAL HERNIA REPAIR  Progress Note    Dale Mann  12/8/2017    Pre-op Diagnosis:   Left inguinal hernia [K40.90]       Post-Op Diagnosis Codes:     * Left inguinal hernia [K40.90]    Procedure/CPT® Codes:      Procedure(s):  OPEN LEFT INGUINAL HERNIA REPAIR WITH MESH    Surgeon(s):  Dale Diehl MD    Anesthesia: General    Staff:   Circulator: Kristie Rincon RN  Scrub Person: Emil Hagen  Assistant: Radha Kat CSA    Estimated Blood Loss: 10 mL    Urine Voided: * No values recorded between 12/8/2017  8:40 AM and 12/8/2017  9:47 AM *    Specimens:                None      Drains:           Findings: left direct inguinal hernia    Complications: none      Dale Diehl MD     Date: 12/8/2017  Time: 9:54 AM

## 2017-12-08 NOTE — PLAN OF CARE
Problem: Patient Care Overview (Adult)  Goal: Plan of Care Review  Outcome: Ongoing (interventions implemented as appropriate)    12/08/17 1026   Coping/Psychosocial Response Interventions   Plan Of Care Reviewed With patient   Patient Care Overview   Progress improving   Outcome Evaluation   Outcome Summary/Follow up Plan vss. Denies pain at this time. No distress noted. meets d/c criteria.         Problem: Perioperative Period (Adult)  Goal: Signs and Symptoms of Listed Potential Problems Will be Absent or Manageable (Perioperative Period)  Outcome: Ongoing (interventions implemented as appropriate)

## 2017-12-13 NOTE — OP NOTE
Operative Note    Dale Stein Mackenzie  12/8/2017    Pre-op Diagnosis:   Left inguinal hernia [K40.90]    Post-op Diagnosis:     Post-Op Diagnosis Codes:     * Left inguinal hernia [K40.90]    Procedure/CPT® Codes:      Procedure(s):  OPEN LEFT INGUINAL HERNIA REPAIR WITH MESH    Surgeon(s):  Dale Diehl MD    Anesthesia: General    Staff:   Circulator: Kristie Rincon RN  Scrub Person: Emil Hagen  Assistant: Radha Kat CSA    Estimated Blood Loss: 10 mL    Specimens:                None      Drains:           Findings: Left direct inguinal hernia    Complications: None    Indication: Symptomatic left inguinal hernia    Operative Note:    Patient was seen, marked, and consented in the preoperative area.  Following this he was taking the operating room and placed in supine position on the OR table.  Gen. anesthetic was administered and an airway was placed and secured by anesthesia.  A preoperative briefing was then performed.  The left groin was then prepped and draped in normal sterile fashion and a timeout was performed.    Following timeout the anterior superior iliac spine and pubic tubercle and left side were palpated and marked on the skin.  Local anesthetic was then injected along the line drawn between these structures.  A skin incision was then made and carried down through the subcutaneous cutaneous tissues using electrocautery.  A single vein was encountered it was clamped divided and ligated using Vicryl ties.  Lynn's fascia was then opened and additional dissection through fatty tissue was conducted to expose the external oblique aponeurosis.  The location of the external inguinal ring was then identified.  All the external oblique fascia was then opened by making a small incision parallel to the direction of the fibers.  Metzenbaum scissors were then passed below the level of the fascia to clear it posteriorly.  The fascia was then opened using scissors all the way down to the  external ring medially and out laterally towards the anterior superior iliac spine.  The fascial leaflets were then grasped with hemostats and elevated up and outward so that blunt dissection could be used to clear the posterior aspect of the fascia both superiorly and inferiorly.  Inferiorly the shelving edge of the inguinal ligament was exposed.  Superiorly the internal oblique aponeurosis was exposed.  A large self-retaining retractor was then placed into the wound to aid in exposure.    The spermatic cord was then bluntly dissected way from the pubic tubercle and a Penrose drain was placed around the structures.  On examination of the pelvic floor there appeared to be a moderate size direct hernia.  This was readily reducible.  The spermatic cord was examined and there was no evidence of an indirect hernia.    The transversalis fascia adjacent to the direct hernia was then closed to keep the hernia contents reduced and allow for easier placement of mesh.  This was done with interrupted PDS sutures.  A polypropylene mesh was then brought onto the field and cut to appropriate size and shape with a keyhole incision to allow for passage of the spermatic cord structures.  It was positioned to cover the pelvic floor.  It was secured medially near the pubic tubercle.  It was secured inferiorly using interrupted PDS sutures to the shelving edge of the inguinal ligament.  It was secured superiorly to the internal oblique aponeurosis again using interrupted PDS sutures.  The 2 tails of the mesh were brought around spermatic cord and secured to one another as well as to the internal oblique fascia.  The mesh was then visualized and appeared to be in good position and well secured.  The wound was then irrigated.  The Penrose drain was then removed.  The external oblique fascia was then closed down to the level of the external ring using running 2-0 Vicryl stitch.  Lynn's fascia was then closed using running 3-0 Vicryl  stitch.  The skin was then closed using running 4-0 Vicryl stitch.  Skin affix was placed over the incision.  The patient was then awakened and returned to the recovery room in good condition.          This document has been electronically signed by Dale Diehl MD on December 13, 2017 2:40 PM        Dale Diehl MD     Date: 12/13/2017  Time: 2:35 PM

## 2017-12-18 ENCOUNTER — OFFICE VISIT (OUTPATIENT)
Dept: SURGERY | Facility: CLINIC | Age: 77
End: 2017-12-18

## 2017-12-18 VITALS
HEIGHT: 69 IN | SYSTOLIC BLOOD PRESSURE: 118 MMHG | BODY MASS INDEX: 32.79 KG/M2 | DIASTOLIC BLOOD PRESSURE: 68 MMHG | WEIGHT: 221.4 LBS

## 2017-12-18 DIAGNOSIS — Z09 FOLLOW UP: Primary | ICD-10-CM

## 2017-12-18 PROCEDURE — 99024 POSTOP FOLLOW-UP VISIT: CPT | Performed by: SURGERY

## 2017-12-18 NOTE — PROGRESS NOTES
CHIEF COMPLAINT:    Chief Complaint   Patient presents with   • Post-op     P.O. Open LIH repair with mesh 12-8-17.       HISTORY OF PRESENT ILLNESS:    Dale Mann is a 77 y.o. male who underwent open left inguinal hernia repair with mesh on 12/8/17.  He returns today for follow up.  He has no complaints today.  Still has mild incisional pain.  Is eating and drinking well at home, having regular bowel function. Reports no fevers or chills.      EXAM:  Vitals:    12/18/17 0937   BP: 118/68         Abdomen soft, left groin incision healing well. No palpable hernia    ASSESSMENT:    S/p LIHR with mesh    PLAN:    Overall doing well.  Will see back prn.  No heavy lifting until 1 month postop.          This document has been electronically signed by Dale Diehl MD on December 18, 2017 11:08 AM

## 2017-12-28 ENCOUNTER — OFFICE VISIT (OUTPATIENT)
Dept: FAMILY MEDICINE CLINIC | Facility: CLINIC | Age: 77
End: 2017-12-28

## 2017-12-28 VITALS
SYSTOLIC BLOOD PRESSURE: 108 MMHG | BODY MASS INDEX: 42.21 KG/M2 | DIASTOLIC BLOOD PRESSURE: 60 MMHG | WEIGHT: 215 LBS | HEIGHT: 60 IN

## 2017-12-28 DIAGNOSIS — Z87.19 HISTORY OF INGUINAL HERNIA REPAIR: Primary | ICD-10-CM

## 2017-12-28 DIAGNOSIS — Z98.890 HISTORY OF INGUINAL HERNIA REPAIR: Primary | ICD-10-CM

## 2017-12-28 PROCEDURE — 99213 OFFICE O/P EST LOW 20 MIN: CPT | Performed by: FAMILY MEDICINE

## 2017-12-28 RX ORDER — FUROSEMIDE 20 MG/1
20 TABLET ORAL DAILY
COMMUNITY
End: 2019-03-25 | Stop reason: DRUGHIGH

## 2017-12-28 RX ORDER — POTASSIUM CHLORIDE 1500 MG/1
20 TABLET, FILM COATED, EXTENDED RELEASE ORAL DAILY
Qty: 60 TABLET | Refills: 5 | Status: SHIPPED | OUTPATIENT
Start: 2017-12-28 | End: 2018-05-25 | Stop reason: SDUPTHER

## 2017-12-28 NOTE — PROGRESS NOTES
Subjective   Dale Mann is a 77 y.o. male who presents to the office for follow-up after left inguinal hernia repair.  He has done well postop.  Not having any significant pain or drainage issues.  Feels the wound is healing well.  Inquires about lifting and work restrictions.      History of Present Illness     The following portions of the patient's history were reviewed and updated as appropriate: allergies, current medications, past family history, past medical history, past social history, past surgical history and problem list.    Review of Systems   Constitutional: Negative for activity change, appetite change, diaphoresis, fatigue, fever and unexpected weight change.   HENT: Negative for congestion, ear pain, hearing loss, sinus pressure, sore throat, tinnitus, trouble swallowing and voice change.    Eyes: Negative.    Respiratory: Negative.    Cardiovascular: Negative.    Gastrointestinal: Negative for abdominal distention, abdominal pain, blood in stool, constipation, diarrhea, nausea and vomiting.   Endocrine: Negative.    Genitourinary: Positive for penile pain and penile swelling. Negative for decreased urine volume, dysuria, frequency, hematuria and urgency.   Musculoskeletal: Negative.    Skin: Positive for rash.   Allergic/Immunologic: Negative.    Neurological: Negative for dizziness, tremors, seizures, syncope, speech difficulty, weakness, numbness and headaches.   Hematological: Negative.    Psychiatric/Behavioral: Negative for dysphoric mood and sleep disturbance. The patient is not nervous/anxious.    All other systems reviewed and are negative.      Objective   Physical Exam   Constitutional: He is oriented to person, place, and time. He appears well-developed and well-nourished. No distress.   HENT:   Head: Normocephalic and atraumatic.   Nose: Nose normal.   Mouth/Throat: Oropharynx is clear and moist.   Eyes: Conjunctivae and EOM are normal. Pupils are equal, round, and reactive to  light.   Neck: Normal range of motion. Neck supple. No JVD present. No tracheal deviation present. No thyromegaly present.   Cardiovascular: Normal rate, regular rhythm, normal heart sounds and intact distal pulses.    No murmur heard.  Pulmonary/Chest: Effort normal and breath sounds normal. He has no wheezes. He exhibits no tenderness.   Abdominal: Soft. Bowel sounds are normal. He exhibits no distension and no mass. There is no tenderness.   Musculoskeletal: Normal range of motion. He exhibits no edema or tenderness.   Lymphadenopathy:     He has no cervical adenopathy.   Neurological: He is alert and oriented to person, place, and time. He exhibits normal muscle tone. Coordination normal.   Skin: Skin is warm and dry. No rash noted. There is erythema. No pallor.   Psychiatric: He has a normal mood and affect.   Nursing note and vitals reviewed.      Assessment/Plan   Dale was seen today for follow-up.    Diagnoses and all orders for this visit:    History of inguinal hernia repair    Other orders  -     potassium chloride ER (K-TAB) 20 MEQ tablet controlled-release ER tablet; Take 1 tablet by mouth Daily.  Refill potassium as he was out of refills, and inguinal hernia appears to be healing well.  Follow-up with surgeon as scheduled        This document has been electronically signed by Dunia Cummings MD on December 28, 2017 4:42 PM

## 2018-01-02 ENCOUNTER — CLINICAL SUPPORT (OUTPATIENT)
Dept: FAMILY MEDICINE CLINIC | Facility: CLINIC | Age: 78
End: 2018-01-02

## 2018-01-02 DIAGNOSIS — E34.9 TESTOSTERONE DEFICIENCY: Primary | ICD-10-CM

## 2018-01-02 PROCEDURE — 96372 THER/PROPH/DIAG INJ SC/IM: CPT | Performed by: FAMILY MEDICINE

## 2018-01-04 RX ADMIN — TESTOSTERONE CYPIONATE 200 MG: 200 INJECTION, SOLUTION INTRAMUSCULAR at 11:01

## 2018-01-08 RX ORDER — FAMOTIDINE 40 MG/1
40 TABLET, FILM COATED ORAL NIGHTLY PRN
Qty: 30 TABLET | Refills: 5 | Status: SHIPPED | OUTPATIENT
Start: 2018-01-08 | End: 2018-07-12 | Stop reason: SDUPTHER

## 2018-01-29 RX ORDER — NITROGLYCERIN 0.4 MG/1
TABLET SUBLINGUAL
Qty: 25 TABLET | Refills: 5 | Status: SHIPPED | OUTPATIENT
Start: 2018-01-29 | End: 2018-03-16 | Stop reason: ALTCHOICE

## 2018-02-02 ENCOUNTER — CLINICAL SUPPORT (OUTPATIENT)
Dept: FAMILY MEDICINE CLINIC | Facility: CLINIC | Age: 78
End: 2018-02-02

## 2018-02-02 DIAGNOSIS — E34.9 TESTOSTERONE DEFICIENCY: Primary | ICD-10-CM

## 2018-02-02 PROCEDURE — 96372 THER/PROPH/DIAG INJ SC/IM: CPT | Performed by: FAMILY MEDICINE

## 2018-02-02 RX ADMIN — TESTOSTERONE CYPIONATE 200 MG: 200 INJECTION, SOLUTION INTRAMUSCULAR at 14:09

## 2018-02-20 DIAGNOSIS — C44.300 SKIN CANCER OF FACE: Primary | ICD-10-CM

## 2018-03-02 ENCOUNTER — CLINICAL SUPPORT (OUTPATIENT)
Dept: FAMILY MEDICINE CLINIC | Facility: CLINIC | Age: 78
End: 2018-03-02

## 2018-03-02 DIAGNOSIS — E34.9 TESTOSTERONE DEFICIENCY: ICD-10-CM

## 2018-03-02 PROCEDURE — 96372 THER/PROPH/DIAG INJ SC/IM: CPT | Performed by: FAMILY MEDICINE

## 2018-03-02 RX ADMIN — TESTOSTERONE CYPIONATE 200 MG: 200 INJECTION, SOLUTION INTRAMUSCULAR at 11:11

## 2018-03-16 ENCOUNTER — OFFICE VISIT (OUTPATIENT)
Dept: GASTROENTEROLOGY | Facility: CLINIC | Age: 78
End: 2018-03-16

## 2018-03-16 VITALS
BODY MASS INDEX: 31.21 KG/M2 | HEIGHT: 70 IN | WEIGHT: 218 LBS | SYSTOLIC BLOOD PRESSURE: 124 MMHG | HEART RATE: 75 BPM | DIASTOLIC BLOOD PRESSURE: 70 MMHG

## 2018-03-16 DIAGNOSIS — R13.10 DYSPHAGIA, UNSPECIFIED TYPE: ICD-10-CM

## 2018-03-16 DIAGNOSIS — R10.13 EPIGASTRIC PAIN: Primary | ICD-10-CM

## 2018-03-16 DIAGNOSIS — Z86.010 ENCOUNTER FOR COLONOSCOPY DUE TO HISTORY OF ADENOMATOUS COLONIC POLYPS: ICD-10-CM

## 2018-03-16 DIAGNOSIS — Z12.11 ENCOUNTER FOR COLONOSCOPY DUE TO HISTORY OF ADENOMATOUS COLONIC POLYPS: ICD-10-CM

## 2018-03-16 PROBLEM — Z86.0101 ENCOUNTER FOR COLONOSCOPY DUE TO HISTORY OF ADENOMATOUS COLONIC POLYPS: Status: ACTIVE | Noted: 2018-03-16

## 2018-03-16 PROCEDURE — 99214 OFFICE O/P EST MOD 30 MIN: CPT | Performed by: NURSE PRACTITIONER

## 2018-03-16 RX ORDER — SODIUM, POTASSIUM,MAG SULFATES 17.5-3.13G
1 SOLUTION, RECONSTITUTED, ORAL ORAL EVERY 12 HOURS
Qty: 2 BOTTLE | Refills: 0 | Status: SHIPPED | OUTPATIENT
Start: 2018-03-16 | End: 2018-05-16 | Stop reason: HOSPADM

## 2018-03-16 RX ORDER — DEXTROSE AND SODIUM CHLORIDE 5; .45 G/100ML; G/100ML
30 INJECTION, SOLUTION INTRAVENOUS CONTINUOUS PRN
Status: CANCELLED | OUTPATIENT
Start: 2018-05-16
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
## 2018-03-28 ENCOUNTER — OFFICE VISIT (OUTPATIENT)
Dept: FAMILY MEDICINE CLINIC | Facility: CLINIC | Age: 78
End: 2018-03-28

## 2018-03-28 VITALS
BODY MASS INDEX: 31.21 KG/M2 | DIASTOLIC BLOOD PRESSURE: 70 MMHG | HEIGHT: 70 IN | WEIGHT: 218 LBS | SYSTOLIC BLOOD PRESSURE: 122 MMHG

## 2018-03-28 DIAGNOSIS — I25.10 CORONARY ARTERIOSCLEROSIS: ICD-10-CM

## 2018-03-28 DIAGNOSIS — R00.2 HEART PALPITATIONS: Primary | ICD-10-CM

## 2018-03-28 PROCEDURE — 99214 OFFICE O/P EST MOD 30 MIN: CPT | Performed by: FAMILY MEDICINE

## 2018-03-28 RX ORDER — METOPROLOL SUCCINATE 50 MG/1
50 TABLET, EXTENDED RELEASE ORAL DAILY
Qty: 30 TABLET | Refills: 5 | Status: SHIPPED | OUTPATIENT
Start: 2018-03-28 | End: 2018-10-15 | Stop reason: DRUGHIGH

## 2018-03-28 RX ORDER — POTASSIUM CHLORIDE 20 MEQ/1
20 TABLET, EXTENDED RELEASE ORAL DAILY
Refills: 4 | COMMUNITY
Start: 2018-03-15 | End: 2019-03-25 | Stop reason: SDUPTHER

## 2018-03-28 RX ORDER — RANOLAZINE 500 MG/1
500 TABLET, EXTENDED RELEASE ORAL EVERY 12 HOURS SCHEDULED
Qty: 610 TABLET | Refills: 5 | Status: SHIPPED | OUTPATIENT
Start: 2018-03-28 | End: 2019-03-25 | Stop reason: DRUGHIGH

## 2018-03-28 NOTE — PROGRESS NOTES
Subjective   Dale Mann is a 77 y.o. male who presents to the office for follow-up on heart issues.  He is having a lot of palpitations and feels like his heart is racing.  Sometimes it wakes him from sleep.  He takes metoprolol tartrate but only 50 mg in the morning.  The episodes occur in the evening or during the middle of the night.  He is scheduled to see his cardiologist in 2 weeks.    History of Present Illness     The following portions of the patient's history were reviewed and updated as appropriate: allergies, current medications, past family history, past medical history, past social history, past surgical history and problem list.    Review of Systems   Constitutional: Negative for activity change, appetite change, diaphoresis, fatigue, fever and unexpected weight change.   HENT: Negative for congestion, ear pain, hearing loss, sinus pressure, sore throat, tinnitus, trouble swallowing and voice change.    Eyes: Negative.    Respiratory: Negative.    Cardiovascular: Negative.    Gastrointestinal: Negative for abdominal distention, abdominal pain, blood in stool, constipation, diarrhea, nausea and vomiting.   Endocrine: Negative.    Genitourinary: Positive for penile pain and penile swelling. Negative for decreased urine volume, dysuria, frequency, hematuria and urgency.   Musculoskeletal: Negative.    Skin: Positive for rash.   Allergic/Immunologic: Negative.    Neurological: Negative for dizziness, tremors, seizures, syncope, speech difficulty, weakness, numbness and headaches.   Hematological: Negative.    Psychiatric/Behavioral: Negative for dysphoric mood and sleep disturbance. The patient is not nervous/anxious.    All other systems reviewed and are negative.      Objective   Physical Exam   Constitutional: He is oriented to person, place, and time. He appears well-developed and well-nourished. No distress.   HENT:   Head: Normocephalic and atraumatic.   Nose: Nose normal.   Mouth/Throat:  Oropharynx is clear and moist.   Eyes: Conjunctivae and EOM are normal. Pupils are equal, round, and reactive to light.   Neck: Normal range of motion. Neck supple. No JVD present. No tracheal deviation present. No thyromegaly present.   Cardiovascular: Normal rate, regular rhythm, normal heart sounds and intact distal pulses.    No murmur heard.  Pulmonary/Chest: Effort normal and breath sounds normal. He has no wheezes. He exhibits no tenderness.   Abdominal: Soft. Bowel sounds are normal. He exhibits no distension and no mass. There is no tenderness.   Musculoskeletal: Normal range of motion. He exhibits no edema or tenderness.   Lymphadenopathy:     He has no cervical adenopathy.   Neurological: He is alert and oriented to person, place, and time. He exhibits normal muscle tone. Coordination normal.   Skin: Skin is warm and dry. No rash noted. There is erythema. No pallor.   Psychiatric: He has a normal mood and affect.   Nursing note and vitals reviewed.      Assessment/Plan   Dale was seen today for follow-up.    Diagnoses and all orders for this visit:    Heart palpitations    Coronary arteriosclerosis    Other orders  -     metoprolol succinate XL (TOPROL XL) 50 MG 24 hr tablet; Take 1 tablet by mouth Daily.  -     ranolazine (RANEXA) 500 MG 12 hr tablet; Take 1 tablet by mouth Every 12 (Twelve) Hours.     We'll switch him to the extended release metoprolol as I think he is getting several hours there really he's just not covered with the short acting medicine, taking it only once a day    Continue Ranexa for coronary disease and follow up with cardiology as scheduled        This document has been electronically signed by Dunia Cummings MD on March 28, 2018 12:14 PM

## 2018-04-03 ENCOUNTER — CLINICAL SUPPORT (OUTPATIENT)
Dept: FAMILY MEDICINE CLINIC | Facility: CLINIC | Age: 78
End: 2018-04-03

## 2018-04-03 DIAGNOSIS — E34.9 TESTOSTERONE DEFICIENCY: Primary | ICD-10-CM

## 2018-04-03 PROCEDURE — 96372 THER/PROPH/DIAG INJ SC/IM: CPT | Performed by: FAMILY MEDICINE

## 2018-04-03 RX ORDER — TESTOSTERONE CYPIONATE 200 MG/ML
200 INJECTION, SOLUTION INTRAMUSCULAR
Status: CANCELLED | OUTPATIENT
Start: 2018-04-03

## 2018-04-16 RX ORDER — NIACIN 1000 MG/1
TABLET, EXTENDED RELEASE ORAL
Qty: 90 TABLET | Refills: 2 | Status: SHIPPED | OUTPATIENT
Start: 2018-04-16 | End: 2018-09-25 | Stop reason: SDUPTHER

## 2018-04-24 ENCOUNTER — LAB (OUTPATIENT)
Dept: LAB | Facility: OTHER | Age: 78
End: 2018-04-24

## 2018-04-24 ENCOUNTER — TRANSCRIBE ORDERS (OUTPATIENT)
Dept: LAB | Facility: OTHER | Age: 78
End: 2018-04-24

## 2018-04-24 DIAGNOSIS — E29.1 HYPOGONADISM, MALE: ICD-10-CM

## 2018-04-24 DIAGNOSIS — N13.8 BPH WITH URINARY OBSTRUCTION: ICD-10-CM

## 2018-04-24 DIAGNOSIS — N40.1 ENLARGED PROSTATE WITH URINARY OBSTRUCTION: ICD-10-CM

## 2018-04-24 DIAGNOSIS — N40.1 BPH WITH URINARY OBSTRUCTION: ICD-10-CM

## 2018-04-24 DIAGNOSIS — N13.8 ENLARGED PROSTATE WITH URINARY OBSTRUCTION: ICD-10-CM

## 2018-04-24 LAB
BASOPHILS # BLD AUTO: 0.02 10*3/MM3 (ref 0–0.2)
BASOPHILS NFR BLD AUTO: 0.3 % (ref 0–2)
DEPRECATED RDW RBC AUTO: 48.9 FL (ref 35.1–43.9)
EOSINOPHIL # BLD AUTO: 0.45 10*3/MM3 (ref 0–0.7)
EOSINOPHIL NFR BLD AUTO: 7.5 % (ref 0–7)
ERYTHROCYTE [DISTWIDTH] IN BLOOD BY AUTOMATED COUNT: 14.7 % (ref 11.5–14.5)
HCT VFR BLD AUTO: 43.7 % (ref 39–49)
HGB BLD-MCNC: 14.7 G/DL (ref 13.7–17.3)
LYMPHOCYTES # BLD AUTO: 1.51 10*3/MM3 (ref 0.6–4.2)
LYMPHOCYTES NFR BLD AUTO: 25.3 % (ref 10–50)
MCH RBC QN AUTO: 31.7 PG (ref 26.5–34)
MCHC RBC AUTO-ENTMCNC: 33.6 G/DL (ref 31.5–36.3)
MCV RBC AUTO: 94.4 FL (ref 80–98)
MONOCYTES # BLD AUTO: 0.57 10*3/MM3 (ref 0–0.9)
MONOCYTES NFR BLD AUTO: 9.5 % (ref 0–12)
NEUTROPHILS # BLD AUTO: 3.42 10*3/MM3 (ref 2–8.6)
NEUTROPHILS NFR BLD AUTO: 57.4 % (ref 37–80)
PLATELET # BLD AUTO: 162 10*3/MM3 (ref 150–450)
PMV BLD AUTO: 8.6 FL (ref 8–12)
PSA SERPL-MCNC: 1.41 NG/ML (ref 0–4)
RBC # BLD AUTO: 4.63 10*6/MM3 (ref 4.37–5.74)
WBC NRBC COR # BLD: 5.97 10*3/MM3 (ref 3.2–9.8)

## 2018-04-24 PROCEDURE — 84153 ASSAY OF PSA TOTAL: CPT | Performed by: UROLOGY

## 2018-04-24 PROCEDURE — 85025 COMPLETE CBC W/AUTO DIFF WBC: CPT | Performed by: INTERNAL MEDICINE

## 2018-04-24 PROCEDURE — 84402 ASSAY OF FREE TESTOSTERONE: CPT | Performed by: UROLOGY

## 2018-04-24 PROCEDURE — 36415 COLL VENOUS BLD VENIPUNCTURE: CPT | Performed by: INTERNAL MEDICINE

## 2018-04-24 PROCEDURE — 84403 ASSAY OF TOTAL TESTOSTERONE: CPT | Performed by: UROLOGY

## 2018-04-27 LAB
TESTOST FREE SERPL-MCNC: 2.3 PG/ML (ref 6.6–18.1)
TESTOST SERPL-MCNC: 190 NG/DL (ref 264–916)

## 2018-04-30 RX ORDER — ISOSORBIDE MONONITRATE 60 MG/1
TABLET, EXTENDED RELEASE ORAL
Qty: 135 TABLET | Refills: 2 | Status: SHIPPED | OUTPATIENT
Start: 2018-04-30 | End: 2018-05-11

## 2018-05-01 ENCOUNTER — CLINICAL SUPPORT (OUTPATIENT)
Dept: FAMILY MEDICINE CLINIC | Facility: CLINIC | Age: 78
End: 2018-05-01

## 2018-05-01 DIAGNOSIS — E34.9 TESTOSTERONE DEFICIENCY: Primary | ICD-10-CM

## 2018-05-01 PROCEDURE — 96372 THER/PROPH/DIAG INJ SC/IM: CPT | Performed by: FAMILY MEDICINE

## 2018-05-01 RX ORDER — TESTOSTERONE CYPIONATE 200 MG/ML
200 INJECTION, SOLUTION INTRAMUSCULAR
Status: SHIPPED | OUTPATIENT
Start: 2018-05-01 | End: 2018-10-16

## 2018-05-01 RX ADMIN — TESTOSTERONE CYPIONATE 200 MG: 200 INJECTION, SOLUTION INTRAMUSCULAR at 14:18

## 2018-05-01 RX ADMIN — TESTOSTERONE CYPIONATE 200 MG: 200 INJECTION, SOLUTION INTRAMUSCULAR at 14:54

## 2018-05-04 ENCOUNTER — TELEPHONE (OUTPATIENT)
Dept: GASTROENTEROLOGY | Facility: CLINIC | Age: 78
End: 2018-05-04

## 2018-05-04 NOTE — TELEPHONE ENCOUNTER
----- Message from Jannette Lipscomb MA sent at 5/3/2018 10:43 AM CDT -----  Contact: [phone]  Patient would like a call to go over medications before his procedure that is scheduled 5/16/18. Per Poo[facility] with [facility].

## 2018-05-11 RX ORDER — ISOSORBIDE MONONITRATE 60 MG/1
60 TABLET, EXTENDED RELEASE ORAL DAILY
COMMUNITY
End: 2019-10-18 | Stop reason: SDUPTHER

## 2018-05-15 ENCOUNTER — CLINICAL SUPPORT (OUTPATIENT)
Dept: FAMILY MEDICINE CLINIC | Facility: CLINIC | Age: 78
End: 2018-05-15

## 2018-05-15 DIAGNOSIS — E34.9 TESTOSTERONE DEFICIENCY: Primary | ICD-10-CM

## 2018-05-15 PROCEDURE — 96372 THER/PROPH/DIAG INJ SC/IM: CPT | Performed by: FAMILY MEDICINE

## 2018-05-15 RX ADMIN — TESTOSTERONE CYPIONATE 200 MG: 200 INJECTION, SOLUTION INTRAMUSCULAR at 13:21

## 2018-05-16 ENCOUNTER — ANESTHESIA (OUTPATIENT)
Dept: GASTROENTEROLOGY | Facility: HOSPITAL | Age: 78
End: 2018-05-16

## 2018-05-16 ENCOUNTER — HOSPITAL ENCOUNTER (OUTPATIENT)
Facility: HOSPITAL | Age: 78
Setting detail: HOSPITAL OUTPATIENT SURGERY
Discharge: HOME | End: 2018-05-16
Attending: INTERNAL MEDICINE | Admitting: INTERNAL MEDICINE

## 2018-05-16 ENCOUNTER — ANESTHESIA EVENT (OUTPATIENT)
Dept: GASTROENTEROLOGY | Facility: HOSPITAL | Age: 78
End: 2018-05-16

## 2018-05-16 VITALS
HEIGHT: 69 IN | SYSTOLIC BLOOD PRESSURE: 95 MMHG | TEMPERATURE: 97 F | WEIGHT: 222 LBS | HEART RATE: 50 BPM | DIASTOLIC BLOOD PRESSURE: 54 MMHG | RESPIRATION RATE: 18 BRPM | OXYGEN SATURATION: 93 % | BODY MASS INDEX: 32.88 KG/M2

## 2018-05-16 DIAGNOSIS — Z86.010 ENCOUNTER FOR COLONOSCOPY DUE TO HISTORY OF ADENOMATOUS COLONIC POLYPS: ICD-10-CM

## 2018-05-16 DIAGNOSIS — R13.10 DYSPHAGIA, UNSPECIFIED TYPE: ICD-10-CM

## 2018-05-16 DIAGNOSIS — Z12.11 ENCOUNTER FOR COLONOSCOPY DUE TO HISTORY OF ADENOMATOUS COLONIC POLYPS: ICD-10-CM

## 2018-05-16 DIAGNOSIS — R10.13 EPIGASTRIC PAIN: ICD-10-CM

## 2018-05-16 PROCEDURE — 43239 EGD BIOPSY SINGLE/MULTIPLE: CPT | Performed by: INTERNAL MEDICINE

## 2018-05-16 PROCEDURE — 88342 IMHCHEM/IMCYTCHM 1ST ANTB: CPT | Performed by: PATHOLOGY

## 2018-05-16 PROCEDURE — 88342 IMHCHEM/IMCYTCHM 1ST ANTB: CPT | Performed by: INTERNAL MEDICINE

## 2018-05-16 PROCEDURE — 88305 TISSUE EXAM BY PATHOLOGIST: CPT | Performed by: PATHOLOGY

## 2018-05-16 PROCEDURE — 25010000002 PROPOFOL 10 MG/ML EMULSION: Performed by: NURSE ANESTHETIST, CERTIFIED REGISTERED

## 2018-05-16 PROCEDURE — 43248 EGD GUIDE WIRE INSERTION: CPT | Performed by: INTERNAL MEDICINE

## 2018-05-16 PROCEDURE — G0121 COLON CA SCRN NOT HI RSK IND: HCPCS | Performed by: INTERNAL MEDICINE

## 2018-05-16 PROCEDURE — 88305 TISSUE EXAM BY PATHOLOGIST: CPT | Performed by: INTERNAL MEDICINE

## 2018-05-16 RX ORDER — DEXTROSE AND SODIUM CHLORIDE 5; .45 G/100ML; G/100ML
30 INJECTION, SOLUTION INTRAVENOUS CONTINUOUS PRN
Status: DISCONTINUED | OUTPATIENT
Start: 2018-05-16 | End: 2018-05-16 | Stop reason: HOSPADM

## 2018-05-16 RX ORDER — PROPOFOL 10 MG/ML
VIAL (ML) INTRAVENOUS AS NEEDED
Status: DISCONTINUED | OUTPATIENT
Start: 2018-05-16 | End: 2018-05-16 | Stop reason: SURG

## 2018-05-16 RX ORDER — LIDOCAINE HYDROCHLORIDE 10 MG/ML
INJECTION, SOLUTION INFILTRATION; PERINEURAL AS NEEDED
Status: DISCONTINUED | OUTPATIENT
Start: 2018-05-16 | End: 2018-05-16 | Stop reason: SURG

## 2018-05-16 RX ADMIN — PROPOFOL 10 MG: 10 INJECTION, EMULSION INTRAVENOUS at 14:57

## 2018-05-16 RX ADMIN — DEXTROSE AND SODIUM CHLORIDE 30 ML/HR: 5; 450 INJECTION, SOLUTION INTRAVENOUS at 13:47

## 2018-05-16 RX ADMIN — LIDOCAINE HYDROCHLORIDE 100 MG: 10 INJECTION, SOLUTION INFILTRATION; PERINEURAL at 14:47

## 2018-05-16 RX ADMIN — PROPOFOL 70 MG: 10 INJECTION, EMULSION INTRAVENOUS at 14:47

## 2018-05-16 RX ADMIN — PROPOFOL 20 MG: 10 INJECTION, EMULSION INTRAVENOUS at 14:59

## 2018-05-16 RX ADMIN — PROPOFOL 30 MG: 10 INJECTION, EMULSION INTRAVENOUS at 14:50

## 2018-05-16 RX ADMIN — PROPOFOL 30 MG: 10 INJECTION, EMULSION INTRAVENOUS at 14:53
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
## 2018-05-16 NOTE — ANESTHESIA POSTPROCEDURE EVALUATION
Patient: Dale Mann    Procedure Summary     Date:  05/16/18 Room / Location:  [facility] ENDOSCOPY 1 / [facility] ENDOSCOPY    Anesthesia Start:  1442 Anesthesia Stop:  1502    Procedures:       ESOPHAGOGASTRODUODENOSCOPY (N/A Esophagus)      COLONOSCOPY (N/A ) Diagnosis:       Epigastric pain      Encounter for colonoscopy due to history of adenomatous colonic polyps      Dysphagia, unspecified type      (Epigastric pain [R10.13])      (Encounter for colonoscopy due to history of adenomatous colonic polyps [Z12.11, Z86.010])      (Dysphagia, unspecified type [R13.10])    Surgeon:  Ricardo Chamberlain MD Provider:  Veda Wyman CRNA    Anesthesia Type:  MAC ASA Status:  3          Anesthesia Type: MAC  Last vitals  BP   112/67 (05/16/18 1330)   Temp   97.6 °F (36.4 °C) (05/16/18 1330)   Pulse   63 (05/16/18 1330)   Resp   18 (05/16/18 1330)     SpO2   96 % (05/16/18 1330)     Post Anesthesia Care and Evaluation    Patient location during evaluation: bedside  Patient participation: complete - patient participated  Level of consciousness: awake and awake and alert  Pain score: 0  Pain management: satisfactory to patient  Airway patency: patent  Anesthetic complications: No anesthetic complications  PONV Status: none  Cardiovascular status: acceptable and stable  Respiratory status: acceptable, room air, unassisted and spontaneous ventilation  Hydration status: acceptable
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
## 2018-05-18 LAB
LAB AP CASE REPORT: NORMAL
LAB AP DIAGNOSIS COMMENT: NORMAL
Lab: NORMAL
PATH REPORT.FINAL DX SPEC: NORMAL
PATH REPORT.GROSS SPEC: NORMAL

## 2018-05-25 ENCOUNTER — OFFICE VISIT (OUTPATIENT)
Dept: GASTROENTEROLOGY | Facility: CLINIC | Age: 78
End: 2018-05-25

## 2018-05-25 VITALS
HEIGHT: 70 IN | SYSTOLIC BLOOD PRESSURE: 122 MMHG | HEART RATE: 69 BPM | DIASTOLIC BLOOD PRESSURE: 68 MMHG | WEIGHT: 219.4 LBS | BODY MASS INDEX: 31.41 KG/M2

## 2018-05-25 DIAGNOSIS — K21.9 GASTROESOPHAGEAL REFLUX DISEASE WITHOUT ESOPHAGITIS: ICD-10-CM

## 2018-05-25 DIAGNOSIS — K29.50 CHRONIC GASTRITIS WITHOUT BLEEDING, UNSPECIFIED GASTRITIS TYPE: ICD-10-CM

## 2018-05-25 DIAGNOSIS — K22.2 STRICTURE AND STENOSIS OF ESOPHAGUS: ICD-10-CM

## 2018-05-25 DIAGNOSIS — K57.30 DIVERTICULOSIS OF LARGE INTESTINE WITHOUT HEMORRHAGE: Primary | ICD-10-CM

## 2018-05-25 PROCEDURE — 99213 OFFICE O/P EST LOW 20 MIN: CPT | Performed by: NURSE PRACTITIONER

## 2018-05-29 RX ORDER — SIMVASTATIN 80 MG
TABLET ORAL
Qty: 90 TABLET | Refills: 2 | Status: SHIPPED | OUTPATIENT
Start: 2018-05-29 | End: 2019-04-29 | Stop reason: SDUPTHER

## 2018-05-30 ENCOUNTER — CLINICAL SUPPORT (OUTPATIENT)
Dept: FAMILY MEDICINE CLINIC | Facility: CLINIC | Age: 78
End: 2018-05-30

## 2018-05-30 DIAGNOSIS — E34.9 TESTOSTERONE DEFICIENCY: Primary | ICD-10-CM

## 2018-05-30 PROCEDURE — 96372 THER/PROPH/DIAG INJ SC/IM: CPT | Performed by: FAMILY MEDICINE

## 2018-05-30 RX ADMIN — TESTOSTERONE CYPIONATE 200 MG: 200 INJECTION, SOLUTION INTRAMUSCULAR at 15:04

## 2018-06-11 ENCOUNTER — TRANSCRIBE ORDERS (OUTPATIENT)
Dept: FAMILY MEDICINE CLINIC | Facility: CLINIC | Age: 78
End: 2018-06-11

## 2018-06-11 DIAGNOSIS — R00.1 BRADYCARDIA: Primary | ICD-10-CM

## 2018-06-14 RX ORDER — METOPROLOL TARTRATE 50 MG/1
TABLET, FILM COATED ORAL
Qty: 30 TABLET | Refills: 5 | Status: SHIPPED | OUTPATIENT
Start: 2018-06-14 | End: 2018-11-12 | Stop reason: SDUPTHER

## 2018-06-15 ENCOUNTER — CLINICAL SUPPORT (OUTPATIENT)
Dept: FAMILY MEDICINE CLINIC | Facility: CLINIC | Age: 78
End: 2018-06-15

## 2018-06-15 DIAGNOSIS — E34.9 TESTOSTERONE DEFICIENCY: Primary | ICD-10-CM

## 2018-06-15 PROCEDURE — 96372 THER/PROPH/DIAG INJ SC/IM: CPT | Performed by: FAMILY MEDICINE

## 2018-06-15 RX ADMIN — TESTOSTERONE CYPIONATE 200 MG: 200 INJECTION, SOLUTION INTRAMUSCULAR at 11:10

## 2018-06-29 ENCOUNTER — CLINICAL SUPPORT (OUTPATIENT)
Dept: FAMILY MEDICINE CLINIC | Facility: CLINIC | Age: 78
End: 2018-06-29

## 2018-06-29 DIAGNOSIS — E34.9 TESTOSTERONE DEFICIENCY: Primary | ICD-10-CM

## 2018-06-29 PROCEDURE — 96372 THER/PROPH/DIAG INJ SC/IM: CPT | Performed by: FAMILY MEDICINE

## 2018-06-29 RX ADMIN — TESTOSTERONE CYPIONATE 200 MG: 200 INJECTION, SOLUTION INTRAMUSCULAR at 11:54

## 2018-07-09 RX ORDER — LANSOPRAZOLE 30 MG/1
30 CAPSULE, DELAYED RELEASE ORAL DAILY
Qty: 30 CAPSULE | Refills: 5 | Status: SHIPPED | OUTPATIENT
Start: 2018-07-09 | End: 2018-10-15 | Stop reason: ALTCHOICE

## 2018-07-12 RX ORDER — FAMOTIDINE 40 MG/1
40 TABLET, FILM COATED ORAL NIGHTLY PRN
Qty: 30 TABLET | Refills: 5 | Status: SHIPPED | OUTPATIENT
Start: 2018-07-12 | End: 2018-10-15

## 2018-07-12 RX ORDER — PANTOPRAZOLE SODIUM 40 MG/1
TABLET, DELAYED RELEASE ORAL
Qty: 90 TABLET | Refills: 1 | Status: SHIPPED | OUTPATIENT
Start: 2018-07-12 | End: 2018-09-05

## 2018-07-16 ENCOUNTER — CLINICAL SUPPORT (OUTPATIENT)
Dept: FAMILY MEDICINE CLINIC | Facility: CLINIC | Age: 78
End: 2018-07-16

## 2018-07-16 DIAGNOSIS — E34.9 TESTOSTERONE DEFICIENCY: Primary | ICD-10-CM

## 2018-07-16 PROCEDURE — 96372 THER/PROPH/DIAG INJ SC/IM: CPT | Performed by: FAMILY MEDICINE

## 2018-07-16 RX ADMIN — TESTOSTERONE CYPIONATE 200 MG: 200 INJECTION, SOLUTION INTRAMUSCULAR at 11:01

## 2018-07-25 DIAGNOSIS — R12 HEARTBURN: Primary | ICD-10-CM

## 2018-07-25 RX ORDER — DEXLANSOPRAZOLE 60 MG/1
60 CAPSULE, DELAYED RELEASE ORAL DAILY
Qty: 30 CAPSULE | Refills: 5 | Status: SHIPPED | OUTPATIENT
Start: 2018-07-25 | End: 2018-08-24

## 2018-07-30 ENCOUNTER — CLINICAL SUPPORT (OUTPATIENT)
Dept: FAMILY MEDICINE CLINIC | Facility: CLINIC | Age: 78
End: 2018-07-30

## 2018-07-30 DIAGNOSIS — E34.9 TESTOSTERONE DEFICIENCY: Primary | ICD-10-CM

## 2018-07-30 PROCEDURE — 96372 THER/PROPH/DIAG INJ SC/IM: CPT | Performed by: FAMILY MEDICINE

## 2018-07-30 RX ADMIN — TESTOSTERONE CYPIONATE 200 MG: 200 INJECTION, SOLUTION INTRAMUSCULAR at 10:59

## 2018-08-01 ENCOUNTER — TELEPHONE (OUTPATIENT)
Dept: GASTROENTEROLOGY | Facility: CLINIC | Age: 78
End: 2018-08-01

## 2018-08-01 NOTE — TELEPHONE ENCOUNTER
----- Message from Jannette Lipscomb MA sent at 8/1/2018  9:20 AM CDT -----  Contact: [phone]  Patient of Toña ESTEVEZ. Patient called stating that he could not afford his Rx for Dexilant and that he is still having issues with reflux between 2-4 a.m. He is currently taking Protonix 40 every morning and Pepcid 40 mg with Lansoprazole 30 mg at bedtime. Patient would like recommendations on what to do for this issue?

## 2018-08-01 NOTE — TELEPHONE ENCOUNTER
08/01/2018, 1608 - Patient telephone call returned per this staff member [phone].  Zero answer.  Voice message submitted with date, time, office contact information, and request to contact office at earliest convenience.    Rationale for speaking with patient - Verbal order received per Dr. Ricardo Bro M.D. - Patient to schedule clinical appointment with ZENAIDA Brock, consume a bland diet, and pursue weight loss.

## 2018-08-02 ENCOUNTER — TELEPHONE (OUTPATIENT)
Dept: GASTROENTEROLOGY | Facility: CLINIC | Age: 78
End: 2018-08-02

## 2018-08-02 NOTE — TELEPHONE ENCOUNTER
----- Message from Trinidad Choi sent at 8/2/2018 10:38 AM CDT -----  Contact: [phone]  Patient returned you call.

## 2018-08-02 NOTE — TELEPHONE ENCOUNTER
08/02/2018, 1208 - Patient telephone call returned per this staff member [phone] with notification of verbal order received per Dr. Ricardo Bro M.D. regarding patient statement patient currently utilizing Protonix 40 MG every morning, Pepcid 40 MG daily, and Lansoprazole 30 MG at night, with continued persistent reflux.  Patient unable to utilize prescription medication Dexilant 60 MG Capsules due to monetary rationale.  Per Dr. Bro - Patient to schedule clinical appointment with ZENAIDA Brock, consume a bland diet, and pursue weight loss.  Patient in agreement.  Clinical appointment with ZENAIDA Brock scheduled Wednesday, September 5. 2018 at 3:15 p.m.  Patient instructed patient will receive telephone call with reminder of clinical appointment date/time.  Patient verbalized understanding.

## 2018-09-05 ENCOUNTER — OFFICE VISIT (OUTPATIENT)
Dept: GASTROENTEROLOGY | Facility: CLINIC | Age: 78
End: 2018-09-05

## 2018-09-05 VITALS
HEIGHT: 70 IN | DIASTOLIC BLOOD PRESSURE: 76 MMHG | SYSTOLIC BLOOD PRESSURE: 140 MMHG | WEIGHT: 224.6 LBS | HEART RATE: 54 BPM | OXYGEN SATURATION: 97 % | BODY MASS INDEX: 32.16 KG/M2

## 2018-09-05 DIAGNOSIS — K21.00 GASTROESOPHAGEAL REFLUX DISEASE WITH ESOPHAGITIS: Primary | ICD-10-CM

## 2018-09-05 DIAGNOSIS — K29.00 ACUTE GASTRITIS WITHOUT HEMORRHAGE, UNSPECIFIED GASTRITIS TYPE: ICD-10-CM

## 2018-09-05 PROCEDURE — 99214 OFFICE O/P EST MOD 30 MIN: CPT | Performed by: NURSE PRACTITIONER

## 2018-09-05 RX ORDER — DEXLANSOPRAZOLE 60 MG/1
60 CAPSULE, DELAYED RELEASE ORAL DAILY
COMMUNITY
End: 2018-09-05

## 2018-09-05 RX ORDER — SUCRALFATE 1 G/1
1 TABLET ORAL 4 TIMES DAILY
Qty: 120 TABLET | Refills: 5 | Status: SHIPPED | OUTPATIENT
Start: 2018-09-05 | End: 2019-11-18

## 2018-09-05 NOTE — PROGRESS NOTES
Chief Complaint   Patient presents with   • Heartburn       Subjective    Dale Mann is a 77 y.o. male. he is here today for follow-up.    History of Present Illness  A 77-year-old male presents for follow-up regarding persistent reflux.  He was started on dexilant however he is still waking up 3-4 times per night and reports it is too expensive to continue.  States dysphagia has occurred only rarely since his dilation.  Currently he is taking Prevacid in the morning Protonix at bedtime along with Pepcid as needed.  Reports he has not been following blank diet very well with alone with his dog and will eat pizza at night at times.  Typically tries not to eat after 6 PM.  Has tried to lose weight however his weight is up 5 pounds from last visit.  Plan; have discussed with patient to not take multiple different PPIs he will take Protonix daily Pepcid at bedtime.  We'll add Carafate before meals and at bedtime.  Follow-up in 2 weeks return to office sooner if needed.  Discussed with patient importance of avoiding GI irritants will closely monitor diet intake.     The following portions of the patient's history were reviewed and updated as appropriate:   Past Medical History:   Diagnosis Date   • Acid reflux    • Arthritis    • Chronic anemia    • Chronic gastritis    • Coronary arteriosclerosis     hx of CABG then stent placement,  is followed by dr pickard in [place]   • Diverticulosis of sigmoid colon    • Dysuria    • Essential hypertension    • Fever    • Hematochezia    • High cholesterol    • History of peptic ulcer    • [place] (hard of hearing)     wearing heading aids david   • Hyperkalemia    • Macular degeneration     left eye only   • Old myocardial infarction    • Other specified anemias    • Screening for malignant neoplasm of prostate    • Upper respiratory infection    • Urinary tract infectious disease      Past Surgical History:   Procedure Laterality Date   • 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 A single polyp was found in the sigmoid colon. Removed by cold biopsy polypectomy. Internal and external hemorrhoids found.   • COLONOSCOPY N/A 5/16/2018    Procedure: COLONOSCOPY;  Surgeon: Ricardo Chamberlain MD;  Location: [facility] ENDOSCOPY;  Service: Gastroenterology   • CORONARY ANGIOPLASTY  2007   • CORONARY ARTERY BYPASS GRAFT     • ENDOSCOPY N/A 5/16/2018    Procedure: ESOPHAGOGASTRODUODENOSCOPY;  Surgeon: Ricardo Chamberlain MD;  Location: [facility] ENDOSCOPY;  Service: Gastroenterology   • INGUINAL HERNIA REPAIR Left 12/8/2017    Procedure: OPEN LEFT INGUINAL HERNIA REPAIR WITH MESH;  Surgeon: Dale Diehl MD;  Location: [facility] OR;  Service:    • PHALLOPLASTY, CIRCUMCISION N/A 5/24/2017    Procedure: CIRCUMCISION;  Surgeon: Dale Lezama MD;  Location: [facility] OR;  Service:    • UPPER GASTROINTESTINAL ENDOSCOPY  01/14/2015    Normal esophagus. Gastritis was found in the stomach. Biopsy taken. Duodenitis was found in the duodenum. Biopsy taken.     Family History   Problem Relation Age of Onset   • Colon polyps Other    • Heart disease Other        Current Outpatient Prescriptions   Medication Sig Dispense Refill   • aspirin 81 MG EC tablet Take 81 mg by mouth Daily. Last dose 11/27/17     • famotidine (PEPCID) 40 MG tablet TAKE 1 TABLET BY MOUTH AT NIGHT AS NEEDED FOR HEARTBURN. 30 tablet 5   • furosemide (LASIX) 20 MG tablet Take 20 mg by mouth Daily.     • isosorbide mononitrate (IMDUR) 60 MG 24 hr tablet Take 60 mg by mouth Daily.     • lansoprazole (PREVACID) 30 MG capsule Take 1 capsule by mouth Daily. 30 capsule 5   • metoprolol succinate XL (TOPROL XL) 50 MG 24 hr tablet Take 1 tablet by mouth Daily. 30 tablet 5   • metoprolol tartrate (LOPRESSOR) 50 MG tablet TAKE ONE TABLET BY MOUTH EVERY DAY  "REPLACES ATENOLOL 30 tablet 5   • niacin (NIASPAN) 1000 MG CR tablet 1 TABLET(S) BY MOUTH AT BEDTIME 90 tablet 2   • nitroglycerin (NITROSTAT) 0.4 MG SL tablet Place 0.4 mg under the tongue Every 5 (Five) Minutes As Needed for Chest Pain. Take no more than 3 doses in 15 minutes.     • polyethyl glycol-propyl glycol (SYSTANE) 0.4-0.3 % solution ophthalmic solution Administer 2 drops to both eyes Every 1 (One) Hour As Needed.     • potassium chloride (K-DUR,KLOR-CON) 20 MEQ CR tablet Take 20 mEq by mouth Daily.  4   • psyllium (METAMUCIL SMOOTH TEXTURE) 28 % packet Take 1 packet by mouth Daily. 30 each 11   • ranolazine (RANEXA) 500 MG 12 hr tablet Take 1 tablet by mouth Every 12 (Twelve) Hours. 610 tablet 5   • simvastatin (ZOCOR) 40 MG tablet Take 40 mg by mouth Every Night.     • Testosterone Cypionate 200 MG/ML kit Inject 1 mL into the appropriate muscle as directed by prescriber Every 14 (Fourteen) Days. 2 each 0   • pantoprazole (PROTONIX) 40 MG EC tablet Take 40 mg by mouth Daily.     • simvastatin (ZOCOR) 80 MG tablet 1 TABLET(S) BY MOUTH AT BEDTIME 90 tablet 2   • sucralfate (CARAFATE) 1 g tablet Take 1 tablet by mouth 4 (Four) Times a Day. 120 tablet 5     Current Facility-Administered Medications   Medication Dose Route Frequency Provider Last Rate Last Dose   • Testosterone Cypionate (DEPOTESTOTERONE CYPIONATE) injection 200 mg  200 mg Intramuscular Q14 Days Dunia Cummings MD   200 mg at 07/30/18 1059     Allergies   Allergen Reactions   • Norvasc [Amlodipine Besylate] Anaphylaxis   • Ticagrelor Anaphylaxis   • Coumadin [Warfarin Sodium] Other (See Comments)     Excessive bleeding   • Lipitor [Atorvastatin] Other (See Comments)     \"affected movement of heart\"   • Plavix [Clopidogrel Bisulfate] Rash     Social History     Social History   • Marital status:      Social History Main Topics   • Smoking status: Former Smoker     Quit date: 1987   • Smokeless tobacco: Never Used   • Alcohol use Yes    " "  Comment: socially   • Drug use: No   • Sexual activity: Defer     Other Topics Concern   • Not on file       Review of Systems  Review of Systems   Constitutional: Negative for activity change, appetite change, chills, diaphoresis, fatigue, fever and unexpected weight change.   HENT: Negative for sore throat and trouble swallowing.    Respiratory: Negative for shortness of breath.    Gastrointestinal: Positive for abdominal pain (Persistent reflux ). Negative for abdominal distention, anal bleeding, blood in stool, constipation, diarrhea, nausea, rectal pain and vomiting.   Musculoskeletal: Negative for arthralgias.   Skin: Negative for pallor.   Neurological: Negative for light-headedness.        /76   Pulse 54   Ht 176.5 cm (69.5\")   Wt 102 kg (224 lb 9.6 oz)   SpO2 97%   BMI 32.69 kg/m²     Objective    Physical Exam   Constitutional: He is oriented to person, place, and time. He appears well-developed and well-nourished. He is cooperative. No distress.   HENT:   Head: Normocephalic and atraumatic.   Neck: Normal range of motion. Neck supple. No thyromegaly present.   Cardiovascular: Normal rate, regular rhythm and normal heart sounds.    Pulmonary/Chest: Effort normal and breath sounds normal. He has no wheezes. He has no rhonchi. He has no rales.   Abdominal: Soft. Normal appearance and bowel sounds are normal. He exhibits no distension, no fluid wave and no ascites. There is no hepatosplenomegaly. There is tenderness in the right upper quadrant and epigastric area. There is no rigidity and no guarding. No hernia.   Lymphadenopathy:     He has no cervical adenopathy.   Neurological: He is alert and oriented to person, place, and time.   Skin: Skin is warm, dry and intact. No rash noted. No pallor.   Psychiatric: He has a normal mood and affect. His speech is normal.     Admission on 05/16/2018, Discharged on 05/16/2018   Component Date Value Ref Range Status   • Case Report 05/16/2018    Final      "               Value:Surgical Pathology Report                         Case: [id number]                                  Authorizing Provider:  Ricardo Chamberlain MD         Collected:           05/16/2018 02:56 PM          Ordering Location:     [facility]             Received:            05/17/2018 08:02 AM                                 [facility]                                                     Pathologist:           Abhinav Lynn MD                                                          Specimen:    Gastric, Antrum, antrum bx                                                                • Final Diagnosis 05/16/2018    Final                    Value:This result contains rich text formatting which cannot be displayed here.   • Comment 05/16/2018    Final                    Value:This result contains rich text formatting which cannot be displayed here.   • Gross Description 05/16/2018    Final                    Value:This result contains rich text formatting which cannot be displayed here.     Assessment/Plan      1. Gastroesophageal reflux disease with esophagitis    2. Acute gastritis without hemorrhage, unspecified gastritis type    .       Orders placed during this encounter include:  No orders of the defined types were placed in this encounter.      * Surgery not found *    Review and/or summary of lab tests, radiology, procedures, medications. Review and summary of old records and obtaining of history. The risks and benefits of my recommendations, as well as other treatment options were discussed with the patient today. Questions were answered.    New Medications Ordered This Visit   Medications   • sucralfate (CARAFATE) 1 g tablet     Sig: Take 1 tablet by mouth 4 (Four) Times a Day.     Dispense:  120 tablet     Refill:  5       Follow-up: Return in about 2 weeks (around 9/19/2018).          This document has been electronically signed by ZENAIDA Montero on September 5, 2018  "4:40 PM             Results for orders placed or performed during the hospital encounter of 05/16/18   Tissue Pathology Exam   Result Value Ref Range    Case Report       Surgical Pathology Report                         Case: [id number]                                  Authorizing Provider:  Ricardo Chamberlain MD         Collected:           05/16/2018 02:56 PM          Ordering Location:     [facility]             Received:            05/17/2018 08:02 AM                                 [facility]                                                     Pathologist:           Abhinav Lynn MD                                                          Specimen:    Gastric, Antrum, antrum bx                                                                 Final Diagnosis       MUCOSA, ANTRUM OF STOMACH:  CHRONIC GASTRITIS.  NEGATIVE FOR HELICOBACTER PYLORI (HP IMMUNOSTAIN).      Comment       Helicobacter pylori (HP) immunostain is performed because an appropriate inflammatory milieu is present and organisms are not seen on H & E stained slides.    HP immunostain was developed and its performance characteristics determined by [facility].  It has not been cleared or approved by the U.S. Food and Drug Administration.  The FDA has determined that such clearance or approval is not necessary.  This test is used for clinical purposes.  It should not be regarded as investigational or for research.  This laboratory is certified under the Clinical Laboratory Improvement Amendments of 1988 (CLIA-88) as qualified to perform high complexity clinical laboratory testing.          Gross Description       The container is labeled \"antrum of stomach\" and has a nodular fragment of white soft tissue measuring 0.4 cm in greatest dimension.  It is entirely embedded as 1A.      Embedded Images     Results for orders placed or performed in visit on 04/24/18   CBC Auto Differential   Result " Value Ref Range    WBC 5.97 3.20 - 9.80 10*3/mm3    RBC 4.63 4.37 - 5.74 10*6/mm3    Hemoglobin 14.7 13.7 - 17.3 g/dL    Hematocrit 43.7 39.0 - 49.0 %    MCV 94.4 80.0 - 98.0 fL    MCH 31.7 26.5 - 34.0 pg    MCHC 33.6 31.5 - 36.3 g/dL    RDW 14.7 (H) 11.5 - 14.5 %    RDW-SD 48.9 (H) 35.1 - 43.9 fl    MPV 8.6 8.0 - 12.0 fL    Platelets 162 150 - 450 10*3/mm3    Neutrophil % 57.4 37.0 - 80.0 %    Lymphocyte % 25.3 10.0 - 50.0 %    Monocyte % 9.5 0.0 - 12.0 %    Eosinophil % 7.5 (H) 0.0 - 7.0 %    Basophil % 0.3 0.0 - 2.0 %    Neutrophils, Absolute 3.42 2.00 - 8.60 10*3/mm3    Lymphocytes, Absolute 1.51 0.60 - 4.20 10*3/mm3    Monocytes, Absolute 0.57 0.00 - 0.90 10*3/mm3    Eosinophils, Absolute 0.45 0.00 - 0.70 10*3/mm3    Basophils, Absolute 0.02 0.00 - 0.20 10*3/mm3   Testosterone, Free, Total   Result Value Ref Range    Testosterone, Total 190 (L) 264 - 916 ng/dL    Testosterone, Free 2.3 (L) 6.6 - 18.1 pg/mL   PSA DIAGNOSTIC   Result Value Ref Range    PSA 1.410 0.000 - 4.000 ng/mL   Results for orders placed or performed in visit on 12/04/17   MRSA Screen, PCR - Swab, Nares   Result Value Ref Range    MRSA, PCR Negative Negative   Basic Metabolic Panel   Result Value Ref Range    Glucose 90 60 - 100 mg/dL    BUN 18 7 - 21 mg/dL    Creatinine 1.16 0.70 - 1.30 mg/dL    Sodium 139 137 - 145 mmol/L    Potassium 4.3 3.5 - 5.1 mmol/L    Chloride 99 95 - 110 mmol/L    CO2 31.0 22.0 - 31.0 mmol/L    Calcium 9.7 8.4 - 10.2 mg/dL    eGFR Non African Amer 61 42 - 98 mL/min/1.73    BUN/Creatinine Ratio 15.5 7.0 - 25.0    Anion Gap 9.0 5.0 - 15.0 mmol/L   Results for orders placed or performed in visit on 09/19/17   Basic Metabolic Panel   Result Value Ref Range    Glucose 102 (H) 70 - 100 mg/dL    BUN 20 8 - 25 mg/dL    Creatinine 1.20 0.40 - 1.30 mg/dL    Sodium 139 134 - 146 mmol/L    Potassium 4.4 3.4 - 5.4 mmol/L    Chloride 102 100 - 112 mmol/L    CO2 28.0 20.0 - 32.0 mmol/L    Calcium 9.1 8.4 - 10.8 mg/dL    eGFR Non   Amer 59 42 - 98 mL/min/1.73    BUN/Creatinine Ratio 16.7 7.0 - 25.0    Anion Gap 9.0 5.0 - 15.0 mmol/L   Results for orders placed or performed in visit on 08/29/17   Basic Metabolic Panel   Result Value Ref Range    Glucose 112 (H) 70 - 100 mg/dL    BUN 29 (H) 8 - 25 mg/dL    Creatinine 1.50 (H) 0.40 - 1.30 mg/dL    Sodium 139 134 - 146 mmol/L    Potassium 4.7 3.4 - 5.4 mmol/L    Chloride 98 (L) 100 - 112 mmol/L    CO2 30.0 20.0 - 32.0 mmol/L    Calcium 9.5 8.4 - 10.8 mg/dL    eGFR Non African Amer 46 42 - 98 mL/min/1.73    BUN/Creatinine Ratio 19.3 7.0 - 25.0    Anion Gap 11.0 5.0 - 15.0 mmol/L   Results for orders placed or performed in visit on 08/14/17   CBC Auto Differential   Result Value Ref Range    WBC 5.90 3.20 - 9.80 10*3/mm3    RBC 4.19 (L) 4.37 - 5.74 10*6/mm3    Hemoglobin 13.8 13.7 - 17.3 g/dL    Hematocrit 40.2 39.0 - 49.0 %    MCV 95.9 80.0 - 98.0 fL    MCH 32.9 26.5 - 34.0 pg    MCHC 34.3 31.5 - 36.3 g/dL    RDW 14.3 11.5 - 14.5 %    RDW-SD 47.4 (H) 35.1 - 43.9 fl    MPV 8.9 8.0 - 12.0 fL    Platelets 132 (L) 150 - 450 10*3/mm3    Neutrophil % 54.7 37.0 - 80.0 %    Lymphocyte % 26.4 10.0 - 50.0 %    Monocyte % 11.9 0.0 - 12.0 %    Eosinophil % 6.8 0.0 - 7.0 %    Basophil % 0.2 0.0 - 2.0 %    Neutrophils, Absolute 3.23 2.00 - 8.60 10*3/mm3    Lymphocytes, Absolute 1.56 0.60 - 4.20 10*3/mm3    Monocytes, Absolute 0.70 0.00 - 0.90 10*3/mm3    Eosinophils, Absolute 0.40 0.00 - 0.70 10*3/mm3    Basophils, Absolute 0.01 0.00 - 0.20 10*3/mm3   CK   Result Value Ref Range    Creatine Kinase 84 26 - 140 U/L   Hepatic Function Panel   Result Value Ref Range    Total Protein 7.0 6.7 - 8.2 g/dL    Albumin 3.70 3.20 - 5.50 g/dL    ALT (SGPT) 12 10 - 60 U/L    AST (SGOT) 19 10 - 60 U/L    Alkaline Phosphatase 58 15 - 121 U/L    Total Bilirubin 1.0 0.2 - 1.0 mg/dL    Bilirubin, Direct 0.2 (H) 0.0 - 0.1 mg/dL    Bilirubin, Indirect 0.8 mg/dL   Lipid Panel   Result Value Ref Range    Total Cholesterol 111  (L) 150 - 200 mg/dL    Triglycerides 82 35 - 160 mg/dL    HDL Cholesterol 41 35 - 100 mg/dL    LDL Cholesterol  54 mg/dL    VLDL Cholesterol 16.4 mg/dL    LDL/HDL Ratio 1.31    Basic Metabolic Panel   Result Value Ref Range    Glucose 104 (H) 70 - 100 mg/dL    BUN 17 8 - 25 mg/dL    Creatinine 1.20 0.40 - 1.30 mg/dL    Sodium 138 134 - 146 mmol/L    Potassium 4.2 3.4 - 5.4 mmol/L    Chloride 102 100 - 112 mmol/L    CO2 25.0 20.0 - 32.0 mmol/L    Calcium 8.9 8.4 - 10.8 mg/dL    eGFR Non African Amer 59 42 - 98 mL/min/1.73    BUN/Creatinine Ratio 14.2 7.0 - 25.0    Anion Gap 11.0 5.0 - 15.0 mmol/L     *Note: Due to a large number of results and/or encounters for the requested time period, some results have not been displayed. A complete set of results can be found in Results Review.

## 2018-09-05 NOTE — PATIENT INSTRUCTIONS
Food Choices for Gastroesophageal Reflux Disease, Adult  When you have gastroesophageal reflux disease (GERD), the foods you eat and your eating habits are very important. Choosing the right foods can help ease your discomfort.  What guidelines do I need to follow?  · Choose fruits, vegetables, whole grains, and low-fat dairy products.  · Choose low-fat meat, fish, and poultry.  · Limit fats such as oils, salad dressings, butter, nuts, and avocado.  · Keep a food diary. This helps you identify foods that cause symptoms.  · Avoid foods that cause symptoms. These may be different for everyone.  · Eat small meals often instead of 3 large meals a day.  · Eat your meals slowly, in a place where you are relaxed.  · Limit fried foods.  · Cook foods using methods other than frying.  · Avoid drinking alcohol.  · Avoid drinking large amounts of liquids with your meals.  · Avoid bending over or lying down until 2-3 hours after eating.  What foods are not recommended?  These are some foods and drinks that may make your symptoms worse:  Vegetables  Tomatoes. Tomato juice. Tomato and spaghetti sauce. Chili peppers. Onion and garlic. Horseradish.  Fruits  Oranges, grapefruit, and lemon (fruit and juice).  Meats  High-fat meats, fish, and poultry. This includes hot dogs, ribs, ham, sausage, salami, and villela.  Dairy  Whole milk and chocolate milk. Sour cream. Cream. Butter. Ice cream. Cream cheese.  Drinks  Coffee and tea. Bubbly (carbonated) drinks or energy drinks.  Condiments  Hot sauce. Barbecue sauce.  Sweets/Desserts  Chocolate and cocoa. Donuts. Peppermint and spearmint.  Fats and Oils  High-fat foods. This includes French fries and potato chips.  Other  Vinegar. Strong spices. This includes black pepper, white pepper, red pepper, cayenne, gaitan powder, cloves, ginger, and chili powder.  The items listed above may not be a complete list of foods and drinks to avoid. Contact your dietitian for more information.  This  information is not intended to replace advice given to you by your health care provider. Make sure you discuss any questions you have with your health care provider.  Document Released: 06/18/2013 Document Revised: 05/25/2017 Document Reviewed: 10/22/2014  [employer] Interactive Patient Education © 2017 [employer]

## 2018-09-06 ENCOUNTER — CLINICAL SUPPORT (OUTPATIENT)
Dept: FAMILY MEDICINE CLINIC | Facility: CLINIC | Age: 78
End: 2018-09-06

## 2018-09-06 DIAGNOSIS — R79.89 LOW TESTOSTERONE: ICD-10-CM

## 2018-09-06 PROCEDURE — 96372 THER/PROPH/DIAG INJ SC/IM: CPT | Performed by: FAMILY MEDICINE

## 2018-09-06 RX ADMIN — TESTOSTERONE CYPIONATE 200 MG: 200 INJECTION, SOLUTION INTRAMUSCULAR at 12:10

## 2018-09-24 ENCOUNTER — CLINICAL SUPPORT (OUTPATIENT)
Dept: FAMILY MEDICINE CLINIC | Facility: CLINIC | Age: 78
End: 2018-09-24

## 2018-09-24 DIAGNOSIS — I10 BENIGN ESSENTIAL HTN: ICD-10-CM

## 2018-09-24 DIAGNOSIS — E34.9 TESTOSTERONE DEFICIENCY: Primary | ICD-10-CM

## 2018-09-24 DIAGNOSIS — E34.9 TESTOSTERONE DEFICIENCY: ICD-10-CM

## 2018-09-24 PROCEDURE — 96372 THER/PROPH/DIAG INJ SC/IM: CPT | Performed by: FAMILY MEDICINE

## 2018-09-24 RX ADMIN — TESTOSTERONE CYPIONATE 200 MG: 200 INJECTION, SOLUTION INTRAMUSCULAR at 10:41

## 2018-09-25 RX ORDER — RANOLAZINE 500 MG/1
TABLET, FILM COATED, EXTENDED RELEASE ORAL
Qty: 60 TABLET | Refills: 0 | Status: SHIPPED | OUTPATIENT
Start: 2018-09-25 | End: 2018-10-15 | Stop reason: SDUPTHER

## 2018-09-25 RX ORDER — NIACIN 1000 MG/1
TABLET, EXTENDED RELEASE ORAL
Qty: 30 TABLET | Refills: 0 | Status: SHIPPED | OUTPATIENT
Start: 2018-09-25 | End: 2019-01-28 | Stop reason: SDUPTHER

## 2018-09-25 RX ORDER — PANTOPRAZOLE SODIUM 40 MG/1
TABLET, DELAYED RELEASE ORAL
Qty: 30 TABLET | Refills: 0 | Status: SHIPPED | OUTPATIENT
Start: 2018-09-25 | End: 2018-10-15 | Stop reason: SDUPTHER

## 2018-09-26 ENCOUNTER — OFFICE VISIT (OUTPATIENT)
Dept: GASTROENTEROLOGY | Facility: CLINIC | Age: 78
End: 2018-09-26

## 2018-09-26 VITALS
BODY MASS INDEX: 33.95 KG/M2 | DIASTOLIC BLOOD PRESSURE: 70 MMHG | HEIGHT: 69 IN | HEART RATE: 67 BPM | SYSTOLIC BLOOD PRESSURE: 130 MMHG | WEIGHT: 229.2 LBS

## 2018-09-26 DIAGNOSIS — K21.00 GASTROESOPHAGEAL REFLUX DISEASE WITH ESOPHAGITIS: Primary | ICD-10-CM

## 2018-09-26 PROCEDURE — 99213 OFFICE O/P EST LOW 20 MIN: CPT | Performed by: NURSE PRACTITIONER

## 2018-09-26 NOTE — PATIENT INSTRUCTIONS
Heartburn  Heartburn is a type of pain or discomfort that can happen in the throat or chest. It is often described as a burning pain. It may also cause a bad taste in the mouth. Heartburn may feel worse when you lie down or bend over, and it is often worse at night. Heartburn may be caused by stomach contents that move back up into the esophagus (reflux).  Follow these instructions at home:  Take these actions to decrease your discomfort and to help avoid complications.  Diet  · Follow a diet as recommended by your health care provider. This may involve avoiding foods and drinks such as:  ? Coffee and tea (with or without caffeine).  ? Drinks that contain alcohol.  ? Energy drinks and sports drinks.  ? Carbonated drinks or sodas.  ? Chocolate and cocoa.  ? Peppermint and mint flavorings.  ? Garlic and onions.  ? Horseradish.  ? Spicy and acidic foods, including peppers, chili powder, gaitan powder, vinegar, hot sauces, and barbecue sauce.  ? Citrus fruit juices and citrus fruits, such as oranges, seferino, and limes.  ? Tomato-based foods, such as red sauce, chili, salsa, and pizza with red sauce.  ? Fried and fatty foods, such as donuts, french fries, potato chips, and high-fat dressings.  ? High-fat meats, such as hot dogs and fatty cuts of red and white meats, such as rib eye steak, sausage, ham, and villela.  ? High-fat dairy items, such as whole milk, butter, and cream cheese.  · Eat small, frequent meals instead of large meals.  · Avoid drinking large amounts of liquid with your meals.  · Avoid eating meals during the 2-3 hours before bedtime.  · Avoid lying down right after you eat.  · Do not exercise right after you eat.  General instructions  · Pay attention to any changes in your symptoms.  · Take over-the-counter and prescription medicines only as told by your health care provider. Do not take aspirin, ibuprofen, or other NSAIDs unless your health care provider told you to do so.  · Do not use any tobacco  products, including cigarettes, chewing tobacco, and e-cigarettes. If you need help quitting, ask your health care provider.  · Wear loose-fitting clothing. Do not wear anything tight around your waist that causes pressure on your abdomen.  · Raise (elevate) the head of your bed about 6 inches (15 cm).  · Try to reduce your stress, such as with yoga or meditation. If you need help reducing stress, ask your health care provider.  · If you are overweight, reduce your weight to an amount that is healthy for you. Ask your health care provider for guidance about a safe weight loss goal.  · Keep all follow-up visits as told by your health care provider. This is important.  Contact a health care provider if:  · You have new symptoms.  · You have unexplained weight loss.  · You have difficulty swallowing, or it hurts to swallow.  · You have wheezing or a persistent cough.  · Your symptoms do not improve with treatment.  · You have frequent heartburn for more than two weeks.  Get help right away if:  · You have pain in your arms, neck, jaw, teeth, or back.  · You feel sweaty, dizzy, or light-headed.  · You have chest pain or shortness of breath.  · You vomit and your vomit looks like blood or coffee grounds.  · Your stool is bloody or black.  This information is not intended to replace advice given to you by your health care provider. Make sure you discuss any questions you have with your health care provider.  Document Released: 05/06/2010 Document Revised: 05/25/2017 Document Reviewed: 04/13/2016  [employer] Interactive Patient Education © 2018 [employer]
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
## 2018-10-08 ENCOUNTER — LAB (OUTPATIENT)
Dept: LAB | Facility: OTHER | Age: 78
End: 2018-10-08

## 2018-10-08 DIAGNOSIS — I10 BENIGN ESSENTIAL HTN: ICD-10-CM

## 2018-10-08 DIAGNOSIS — E34.9 TESTOSTERONE DEFICIENCY: ICD-10-CM

## 2018-10-08 LAB
ALBUMIN SERPL-MCNC: 4 G/DL (ref 3.5–5)
ALBUMIN/GLOB SERPL: 1.1 G/DL (ref 1.1–1.8)
ALP SERPL-CCNC: 72 U/L (ref 38–126)
ALT SERPL W P-5'-P-CCNC: 16 U/L
ANION GAP SERPL CALCULATED.3IONS-SCNC: 8 MMOL/L (ref 5–15)
AST SERPL-CCNC: 22 U/L (ref 17–59)
BASOPHILS # BLD AUTO: 0.01 10*3/MM3 (ref 0–0.2)
BASOPHILS NFR BLD AUTO: 0.2 % (ref 0–2)
BILIRUB SERPL-MCNC: 1.1 MG/DL (ref 0.2–1.3)
BUN BLD-MCNC: 16 MG/DL (ref 9–20)
BUN/CREAT SERPL: 12.2 (ref 7–25)
CALCIUM SPEC-SCNC: 9.2 MG/DL (ref 8.4–10.2)
CHLORIDE SERPL-SCNC: 104 MMOL/L (ref 98–107)
CHOLEST SERPL-MCNC: 108 MG/DL (ref 150–200)
CO2 SERPL-SCNC: 29 MMOL/L (ref 22–30)
CREAT BLD-MCNC: 1.31 MG/DL (ref 0.66–1.25)
DEPRECATED RDW RBC AUTO: 57.2 FL (ref 35.1–43.9)
EOSINOPHIL # BLD AUTO: 0.3 10*3/MM3 (ref 0–0.7)
EOSINOPHIL NFR BLD AUTO: 4.9 % (ref 0–7)
ERYTHROCYTE [DISTWIDTH] IN BLOOD BY AUTOMATED COUNT: 17.6 % (ref 11.5–14.5)
GFR SERPL CREATININE-BSD FRML MDRD: 53 ML/MIN/1.73 (ref 42–98)
GLOBULIN UR ELPH-MCNC: 3.5 GM/DL (ref 2.3–3.5)
GLUCOSE BLD-MCNC: 114 MG/DL (ref 74–99)
HCT VFR BLD AUTO: 44.4 % (ref 39–49)
HDLC SERPL-MCNC: 45 MG/DL (ref 40–59)
HGB BLD-MCNC: 14.7 G/DL (ref 13.7–17.3)
LDLC SERPL CALC-MCNC: 38 MG/DL
LDLC/HDLC SERPL: 0.84 {RATIO} (ref 0–3.55)
LYMPHOCYTES # BLD AUTO: 1.47 10*3/MM3 (ref 0.6–4.2)
LYMPHOCYTES NFR BLD AUTO: 24 % (ref 10–50)
MCH RBC QN AUTO: 30.1 PG (ref 26.5–34)
MCHC RBC AUTO-ENTMCNC: 33.1 G/DL (ref 31.5–36.3)
MCV RBC AUTO: 90.8 FL (ref 80–98)
MONOCYTES # BLD AUTO: 0.78 10*3/MM3 (ref 0–0.9)
MONOCYTES NFR BLD AUTO: 12.7 % (ref 0–12)
NEUTROPHILS # BLD AUTO: 3.56 10*3/MM3 (ref 2–8.6)
NEUTROPHILS NFR BLD AUTO: 58.2 % (ref 37–80)
PLATELET # BLD AUTO: 127 10*3/MM3 (ref 150–450)
PMV BLD AUTO: 8.1 FL (ref 8–12)
POTASSIUM BLD-SCNC: 4.3 MMOL/L (ref 3.4–5)
PROT SERPL-MCNC: 7.5 G/DL (ref 6.3–8.2)
RBC # BLD AUTO: 4.89 10*6/MM3 (ref 4.37–5.74)
SODIUM BLD-SCNC: 141 MMOL/L (ref 137–145)
T4 FREE SERPL-MCNC: 1.05 NG/DL (ref 0.78–2.19)
TRIGL SERPL-MCNC: 127 MG/DL
TSH SERPL DL<=0.05 MIU/L-ACNC: 1.43 MIU/ML (ref 0.46–4.68)
VLDLC SERPL-MCNC: 25.4 MG/DL
WBC NRBC COR # BLD: 6.12 10*3/MM3 (ref 3.2–9.8)

## 2018-10-08 PROCEDURE — 84402 ASSAY OF FREE TESTOSTERONE: CPT | Performed by: FAMILY MEDICINE

## 2018-10-08 PROCEDURE — 80053 COMPREHEN METABOLIC PANEL: CPT | Performed by: FAMILY MEDICINE

## 2018-10-08 PROCEDURE — 85025 COMPLETE CBC W/AUTO DIFF WBC: CPT | Performed by: FAMILY MEDICINE

## 2018-10-08 PROCEDURE — 84439 ASSAY OF FREE THYROXINE: CPT | Performed by: FAMILY MEDICINE

## 2018-10-08 PROCEDURE — 84403 ASSAY OF TOTAL TESTOSTERONE: CPT | Performed by: FAMILY MEDICINE

## 2018-10-08 PROCEDURE — 84443 ASSAY THYROID STIM HORMONE: CPT | Performed by: FAMILY MEDICINE

## 2018-10-08 PROCEDURE — 36415 COLL VENOUS BLD VENIPUNCTURE: CPT | Performed by: FAMILY MEDICINE

## 2018-10-08 PROCEDURE — 80061 LIPID PANEL: CPT | Performed by: FAMILY MEDICINE

## 2018-10-10 LAB
TESTOST FREE SERPL-MCNC: 6.9 PG/ML (ref 6.6–18.1)
TESTOST SERPL-MCNC: 479 NG/DL (ref 264–916)

## 2018-10-15 ENCOUNTER — OFFICE VISIT (OUTPATIENT)
Dept: FAMILY MEDICINE CLINIC | Facility: CLINIC | Age: 78
End: 2018-10-15

## 2018-10-15 VITALS
HEIGHT: 69 IN | SYSTOLIC BLOOD PRESSURE: 138 MMHG | WEIGHT: 220.2 LBS | BODY MASS INDEX: 32.61 KG/M2 | DIASTOLIC BLOOD PRESSURE: 78 MMHG

## 2018-10-15 DIAGNOSIS — I25.10 CORONARY ARTERY DISEASE DUE TO LIPID RICH PLAQUE: ICD-10-CM

## 2018-10-15 DIAGNOSIS — K21.9 GASTROESOPHAGEAL REFLUX DISEASE, ESOPHAGITIS PRESENCE NOT SPECIFIED: ICD-10-CM

## 2018-10-15 DIAGNOSIS — E34.9 TESTOSTERONE DEFICIENCY: ICD-10-CM

## 2018-10-15 DIAGNOSIS — Z23 NEED FOR VACCINATION: ICD-10-CM

## 2018-10-15 DIAGNOSIS — I10 ESSENTIAL HYPERTENSION: Primary | ICD-10-CM

## 2018-10-15 DIAGNOSIS — I25.83 CORONARY ARTERY DISEASE DUE TO LIPID RICH PLAQUE: ICD-10-CM

## 2018-10-15 PROCEDURE — 99214 OFFICE O/P EST MOD 30 MIN: CPT | Performed by: FAMILY MEDICINE

## 2018-10-15 PROCEDURE — G0008 ADMIN INFLUENZA VIRUS VAC: HCPCS | Performed by: FAMILY MEDICINE

## 2018-10-15 PROCEDURE — 90662 IIV NO PRSV INCREASED AG IM: CPT | Performed by: FAMILY MEDICINE

## 2018-10-15 RX ORDER — PANTOPRAZOLE SODIUM 40 MG/1
40 TABLET, DELAYED RELEASE ORAL 2 TIMES DAILY
Qty: 60 TABLET | Refills: 5 | Status: SHIPPED | OUTPATIENT
Start: 2018-10-15 | End: 2019-03-26

## 2018-10-15 NOTE — PROGRESS NOTES
Subjective   Dale Mann is a 77 y.o. male who presents to the office for follow up on hypertension, renal insufficiency, GERD, and recent labs.  He takes Protonix in the morning, Carafate 4 times a day, and Pepcid at night but still has heartburns especially in the morning times.  He is on testosterone twice monthly and his levels are good.     History of Present Illness   Hypertension   This is a chronic problem. The current episode started more than 1 year ago. The problem has been waxing and waning since onset. Associated symptoms include anxiety, headaches and malaise/fatigue. Pertinent negatives include no blurred vision, chest pain, neck pain, orthopnea, palpitations, peripheral edema, PND, shortness of breath or sweats. There are no associated agents to hypertension. Risk factors for coronary artery disease include dyslipidemia and family history. Past treatments include diuretics,  The current treatment provides moderate improvement. There are no compliance problems.      The following portions of the patient's history were reviewed and updated as appropriate: allergies, current medications, past family history, past medical history, past social history, past surgical history and problem list.    Review of Systems   Constitutional: Negative for activity change, appetite change, diaphoresis, fatigue, fever and unexpected weight change.   HENT: Negative for congestion, ear pain, hearing loss, sinus pressure, sore throat, tinnitus, trouble swallowing and voice change.    Eyes: Negative.    Respiratory: Negative.    Cardiovascular: Negative.    Gastrointestinal: Negative for abdominal distention, abdominal pain, blood in stool, constipation, diarrhea, nausea and vomiting.   Endocrine: Negative.    Genitourinary: Positive for penile pain and penile swelling. Negative for decreased urine volume, dysuria, frequency, hematuria and urgency.   Musculoskeletal: Negative.    Skin: Positive for rash.    Allergic/Immunologic: Negative.    Neurological: Negative for dizziness, tremors, seizures, syncope, speech difficulty, weakness, numbness and headaches.   Hematological: Negative.    Psychiatric/Behavioral: Negative for dysphoric mood and sleep disturbance. The patient is not nervous/anxious.    All other systems reviewed and are negative.      Objective   Physical Exam   Constitutional: He is oriented to person, place, and time. He appears well-developed and well-nourished. No distress.   HENT:   Head: Normocephalic and atraumatic.   Nose: Nose normal.   Mouth/Throat: Oropharynx is clear and moist.   Eyes: Pupils are equal, round, and reactive to light. Conjunctivae and EOM are normal.   Neck: Normal range of motion. Neck supple. No JVD present. No tracheal deviation present. No thyromegaly present.   Cardiovascular: Normal rate, regular rhythm, normal heart sounds and intact distal pulses.    No murmur heard.  Pulmonary/Chest: Effort normal and breath sounds normal. He has no wheezes. He exhibits no tenderness.   Abdominal: Soft. Bowel sounds are normal. He exhibits no distension and no mass. There is no tenderness.   Musculoskeletal: Normal range of motion. He exhibits no edema or tenderness.   Lymphadenopathy:     He has no cervical adenopathy.   Neurological: He is alert and oriented to person, place, and time. He exhibits normal muscle tone. Coordination normal.   Skin: Skin is warm and dry. No rash noted. There is erythema. No pallor.   Psychiatric: He has a normal mood and affect.   Nursing note and vitals reviewed.    Lab on 10/08/2018   Component Date Value Ref Range Status   • Testosterone, Total 10/08/2018 479  264 - 916 ng/dL Final    Adult male reference interval is based on a population of  healthy nonobese males (BMI <30) between 19 and 39 years old.  Juanito et.al. JCEM 2017,102;8750-0144. PMID: 52973986.   • Testosterone, Free 10/08/2018 6.9  6.6 - 18.1 pg/mL Final   • Total Cholesterol  10/08/2018 108* 150 - 200 mg/dL Final   • Triglycerides 10/08/2018 127  <=150 mg/dL Final   • HDL Cholesterol 10/08/2018 45  40 - 59 mg/dL Final   • LDL Cholesterol  10/08/2018 38  <=100 mg/dL Final   • VLDL Cholesterol 10/08/2018 25.4  mg/dL Final   • LDL/HDL Ratio 10/08/2018 0.84  0.00 - 3.55 Final   • Glucose 10/08/2018 114* 74 - 99 mg/dL Final   • BUN 10/08/2018 16  9 - 20 mg/dL Final   • Creatinine 10/08/2018 1.31* 0.66 - 1.25 mg/dL Final   • Sodium 10/08/2018 141  137 - 145 mmol/L Final   • Potassium 10/08/2018 4.3  3.4 - 5.0 mmol/L Final   • Chloride 10/08/2018 104  98 - 107 mmol/L Final   • CO2 10/08/2018 29.0  22.0 - 30.0 mmol/L Final   • Calcium 10/08/2018 9.2  8.4 - 10.2 mg/dL Final   • Total Protein 10/08/2018 7.5  6.3 - 8.2 g/dL Final   • Albumin 10/08/2018 4.00  3.50 - 5.00 g/dL Final   • ALT (SGPT) 10/08/2018 16  <=50 U/L Final   • AST (SGOT) 10/08/2018 22  17 - 59 U/L Final   • Alkaline Phosphatase 10/08/2018 72  38 - 126 U/L Final   • Total Bilirubin 10/08/2018 1.1  0.2 - 1.3 mg/dL Final   • eGFR Non  Amer 10/08/2018 53  42 - 98 mL/min/1.73 Final   • Globulin 10/08/2018 3.5  2.3 - 3.5 gm/dL Final   • A/G Ratio 10/08/2018 1.1  1.1 - 1.8 g/dL Final   • BUN/Creatinine Ratio 10/08/2018 12.2  7.0 - 25.0 Final   • Anion Gap 10/08/2018 8.0  5.0 - 15.0 mmol/L Final   • TSH 10/08/2018 1.430  0.460 - 4.680 mIU/mL Final   • Free T4 10/08/2018 1.05  0.78 - 2.19 ng/dL Final   • WBC 10/08/2018 6.12  3.20 - 9.80 10*3/mm3 Final   • RBC 10/08/2018 4.89  4.37 - 5.74 10*6/mm3 Final   • Hemoglobin 10/08/2018 14.7  13.7 - 17.3 g/dL Final   • Hematocrit 10/08/2018 44.4  39.0 - 49.0 % Final   • MCV 10/08/2018 90.8  80.0 - 98.0 fL Final   • MCH 10/08/2018 30.1  26.5 - 34.0 pg Final   • MCHC 10/08/2018 33.1  31.5 - 36.3 g/dL Final   • RDW 10/08/2018 17.6* 11.5 - 14.5 % Final   • RDW-SD 10/08/2018 57.2* 35.1 - 43.9 fl Final   • MPV 10/08/2018 8.1  8.0 - 12.0 fL Final   • Platelets 10/08/2018 127* 150 - 450 10*3/mm3  Final   • Neutrophil % 10/08/2018 58.2  37.0 - 80.0 % Final   • Lymphocyte % 10/08/2018 24.0  10.0 - 50.0 % Final   • Monocyte % 10/08/2018 12.7* 0.0 - 12.0 % Final   • Eosinophil % 10/08/2018 4.9  0.0 - 7.0 % Final   • Basophil % 10/08/2018 0.2  0.0 - 2.0 % Final   • Neutrophils, Absolute 10/08/2018 3.56  2.00 - 8.60 10*3/mm3 Final   • Lymphocytes, Absolute 10/08/2018 1.47  0.60 - 4.20 10*3/mm3 Final   • Monocytes, Absolute 10/08/2018 0.78  0.00 - 0.90 10*3/mm3 Final   • Eosinophils, Absolute 10/08/2018 0.30  0.00 - 0.70 10*3/mm3 Final   • Basophils, Absolute 10/08/2018 0.01  0.00 - 0.20 10*3/mm3 Final     Current Outpatient Prescriptions:   •  aspirin 81 MG EC tablet, Take 81 mg by mouth Daily. Last dose 11/27/17, Disp: , Rfl:   •  furosemide (LASIX) 20 MG tablet, Take 20 mg by mouth Daily., Disp: , Rfl:   •  isosorbide mononitrate (IMDUR) 60 MG 24 hr tablet, Take 60 mg by mouth Daily., Disp: , Rfl:   •  metoprolol tartrate (LOPRESSOR) 50 MG tablet, TAKE ONE TABLET BY MOUTH EVERY DAY REPLACES ATENOLOL, Disp: 30 tablet, Rfl: 5  •  niacin (NIASPAN) 1000 MG CR tablet, 1 TABLET(S) BY MOUTH AT BEDTIME, Disp: 30 tablet, Rfl: 0  •  nitroglycerin (NITROSTAT) 0.4 MG SL tablet, Place 0.4 mg under the tongue Every 5 (Five) Minutes As Needed for Chest Pain. Take no more than 3 doses in 15 minutes., Disp: , Rfl:   •  pantoprazole (PROTONIX) 40 MG EC tablet, Take 1 tablet by mouth 2 (Two) Times a Day., Disp: 60 tablet, Rfl: 5  •  polyethyl glycol-propyl glycol (SYSTANE) 0.4-0.3 % solution ophthalmic solution, Administer 2 drops to both eyes Every 1 (One) Hour As Needed., Disp: , Rfl:   •  potassium chloride (K-DUR,KLOR-CON) 20 MEQ CR tablet, Take 20 mEq by mouth Daily., Disp: , Rfl: 4  •  psyllium (METAMUCIL SMOOTH TEXTURE) 28 % packet, Take 1 packet by mouth Daily., Disp: 30 each, Rfl: 11  •  ranolazine (RANEXA) 500 MG 12 hr tablet, Take 1 tablet by mouth Every 12 (Twelve) Hours., Disp: 610 tablet, Rfl: 5  •  simvastatin  (ZOCOR) 80 MG tablet, 1 TABLET(S) BY MOUTH AT BEDTIME, Disp: 90 tablet, Rfl: 2  •  sucralfate (CARAFATE) 1 g tablet, Take 1 tablet by mouth 4 (Four) Times a Day., Disp: 120 tablet, Rfl: 5  •  Testosterone Cypionate 200 MG/ML kit, Inject 1 mL into the appropriate muscle as directed by prescriber Every 14 (Fourteen) Days., Disp: 2 each, Rfl: 5    Current Facility-Administered Medications:   •  Testosterone Cypionate (DEPOTESTOTERONE CYPIONATE) injection 200 mg, 200 mg, Intramuscular, Q14 Days, Dunia Cummings MD, 200 mg at 10/08/18 0947      Assessment/Plan   Dale was seen today for follow-up.    Diagnoses and all orders for this visit:    Essential hypertension    Gastroesophageal reflux disease, esophagitis presence not specified    Coronary artery disease due to lipid rich plaque    Testosterone deficiency  -     Testosterone Cypionate 200 MG/ML kit; Inject 1 mL into the appropriate muscle as directed by prescriber Every 14 (Fourteen) Days.    Need for vaccination  -     Flu Vaccine High Dose PF 65YR+ ([id number])    Other orders  -     pantoprazole (PROTONIX) 40 MG EC tablet; Take 1 tablet by mouth 2 (Two) Times a Day.    Reviewed recent labs as above.  We will continue testosterone shots twice monthly.  Flu shot given today    Continue with Protonix but will increase to twice a day.  He can eliminate the Pepcid at night and he may continue the Carafate.  Her symptoms of reflux and improving in a month consider referral for EGD for further studies    Follow-up with cardiology as scheduled and continue with current medications for hypertension, hyperlipidemia.     The patient has read and signed the [facility] Controlled Substance Contract.  I will continue to see patient for regular follow up appointments. Patient is well controlled on the medication.  SHRAVAN has been reviewed by me and is updated every 3 months. The patient is aware of the potential for addiction and dependence.           This document has  been electronically signed by Dunia Cummings MD on October 15, 2018 9:31 AM

## 2018-11-12 RX ORDER — METOPROLOL TARTRATE 50 MG/1
TABLET, FILM COATED ORAL
Qty: 30 TABLET | Refills: 5 | Status: SHIPPED | OUTPATIENT
Start: 2018-11-12 | End: 2019-03-25 | Stop reason: DRUGHIGH

## 2018-11-14 ENCOUNTER — CLINICAL SUPPORT (OUTPATIENT)
Dept: FAMILY MEDICINE CLINIC | Facility: CLINIC | Age: 78
End: 2018-11-14

## 2018-11-14 DIAGNOSIS — E34.9 TESTOSTERONE DEFICIENCY: Primary | ICD-10-CM

## 2018-11-14 PROCEDURE — 96372 THER/PROPH/DIAG INJ SC/IM: CPT | Performed by: FAMILY MEDICINE

## 2018-11-14 RX ORDER — TESTOSTERONE CYPIONATE 200 MG/ML
200 INJECTION, SOLUTION INTRAMUSCULAR
Status: SHIPPED | OUTPATIENT
Start: 2018-11-14

## 2018-11-14 RX ADMIN — TESTOSTERONE CYPIONATE 200 MG: 200 INJECTION, SOLUTION INTRAMUSCULAR at 13:54

## 2018-11-28 ENCOUNTER — CLINICAL SUPPORT (OUTPATIENT)
Dept: FAMILY MEDICINE CLINIC | Facility: CLINIC | Age: 78
End: 2018-11-28

## 2018-11-28 DIAGNOSIS — R79.89 LOW TESTOSTERONE: ICD-10-CM

## 2018-11-28 PROCEDURE — 96372 THER/PROPH/DIAG INJ SC/IM: CPT | Performed by: FAMILY MEDICINE

## 2018-11-28 RX ORDER — RANOLAZINE 500 MG/1
TABLET, FILM COATED, EXTENDED RELEASE ORAL
Qty: 60 TABLET | Refills: 5 | Status: SHIPPED | OUTPATIENT
Start: 2018-11-28 | End: 2019-03-25 | Stop reason: DRUGHIGH

## 2018-11-28 RX ADMIN — TESTOSTERONE CYPIONATE 200 MG: 200 INJECTION, SOLUTION INTRAMUSCULAR at 12:19

## 2018-12-12 ENCOUNTER — CLINICAL SUPPORT (OUTPATIENT)
Dept: FAMILY MEDICINE CLINIC | Facility: CLINIC | Age: 78
End: 2018-12-12

## 2018-12-12 DIAGNOSIS — E34.9 TESTOSTERONE DEFICIENCY: ICD-10-CM

## 2018-12-12 PROCEDURE — 96372 THER/PROPH/DIAG INJ SC/IM: CPT | Performed by: FAMILY MEDICINE

## 2018-12-12 RX ADMIN — TESTOSTERONE CYPIONATE 200 MG: 200 INJECTION, SOLUTION INTRAMUSCULAR at 11:49

## 2018-12-26 ENCOUNTER — CLINICAL SUPPORT (OUTPATIENT)
Dept: FAMILY MEDICINE CLINIC | Facility: CLINIC | Age: 78
End: 2018-12-26

## 2018-12-26 DIAGNOSIS — E34.9 TESTOSTERONE DEFICIENCY: ICD-10-CM

## 2018-12-26 PROCEDURE — 96372 THER/PROPH/DIAG INJ SC/IM: CPT | Performed by: FAMILY MEDICINE

## 2018-12-26 RX ADMIN — TESTOSTERONE CYPIONATE 200 MG: 200 INJECTION, SOLUTION INTRAMUSCULAR at 12:11

## 2019-01-14 ENCOUNTER — CLINICAL SUPPORT (OUTPATIENT)
Dept: FAMILY MEDICINE CLINIC | Facility: CLINIC | Age: 79
End: 2019-01-14

## 2019-01-14 DIAGNOSIS — E34.9 TESTOSTERONE DEFICIENCY: ICD-10-CM

## 2019-01-14 PROCEDURE — 96372 THER/PROPH/DIAG INJ SC/IM: CPT | Performed by: FAMILY MEDICINE

## 2019-01-14 RX ADMIN — TESTOSTERONE CYPIONATE 200 MG: 200 INJECTION, SOLUTION INTRAMUSCULAR at 11:22

## 2019-01-15 RX ORDER — POTASSIUM CHLORIDE 20 MEQ/1
TABLET, EXTENDED RELEASE ORAL
Qty: 60 TABLET | Refills: 5 | Status: SHIPPED | OUTPATIENT
Start: 2019-01-15 | End: 2019-12-10 | Stop reason: SDUPTHER

## 2019-01-28 RX ORDER — NIACIN 1000 MG/1
TABLET, EXTENDED RELEASE ORAL
Qty: 30 TABLET | Refills: 0 | Status: SHIPPED | OUTPATIENT
Start: 2019-01-28 | End: 2019-02-27 | Stop reason: SDUPTHER

## 2019-01-31 ENCOUNTER — CLINICAL SUPPORT (OUTPATIENT)
Dept: FAMILY MEDICINE CLINIC | Facility: CLINIC | Age: 79
End: 2019-01-31

## 2019-01-31 DIAGNOSIS — E34.9 TESTOSTERONE DEFICIENCY: ICD-10-CM

## 2019-01-31 PROCEDURE — 96372 THER/PROPH/DIAG INJ SC/IM: CPT | Performed by: NURSE PRACTITIONER

## 2019-01-31 RX ADMIN — TESTOSTERONE CYPIONATE 200 MG: 200 INJECTION, SOLUTION INTRAMUSCULAR at 13:49

## 2019-02-15 ENCOUNTER — CLINICAL SUPPORT (OUTPATIENT)
Dept: FAMILY MEDICINE CLINIC | Facility: CLINIC | Age: 79
End: 2019-02-15

## 2019-02-15 DIAGNOSIS — R79.89 LOW TESTOSTERONE IN MALE: ICD-10-CM

## 2019-02-15 PROCEDURE — 96372 THER/PROPH/DIAG INJ SC/IM: CPT | Performed by: FAMILY MEDICINE

## 2019-02-15 RX ADMIN — TESTOSTERONE CYPIONATE 200 MG: 200 INJECTION, SOLUTION INTRAMUSCULAR at 11:27

## 2019-02-27 ENCOUNTER — CLINICAL SUPPORT (OUTPATIENT)
Dept: FAMILY MEDICINE CLINIC | Facility: CLINIC | Age: 79
End: 2019-02-27

## 2019-02-27 DIAGNOSIS — E34.9 TESTOSTERONE DEFICIENCY: ICD-10-CM

## 2019-02-27 PROCEDURE — 96372 THER/PROPH/DIAG INJ SC/IM: CPT | Performed by: FAMILY MEDICINE

## 2019-02-27 RX ORDER — NIACIN 1000 MG/1
TABLET, EXTENDED RELEASE ORAL
Qty: 30 TABLET | Refills: 0 | Status: SHIPPED | OUTPATIENT
Start: 2019-02-27 | End: 2019-03-25

## 2019-02-27 RX ADMIN — TESTOSTERONE CYPIONATE 200 MG: 200 INJECTION, SOLUTION INTRAMUSCULAR at 11:39

## 2019-03-14 ENCOUNTER — TELEPHONE (OUTPATIENT)
Dept: FAMILY MEDICINE CLINIC | Facility: CLINIC | Age: 79
End: 2019-03-14

## 2019-03-25 ENCOUNTER — OFFICE VISIT (OUTPATIENT)
Dept: FAMILY MEDICINE CLINIC | Facility: CLINIC | Age: 79
End: 2019-03-25

## 2019-03-25 VITALS
HEART RATE: 60 BPM | HEIGHT: 69 IN | BODY MASS INDEX: 32.44 KG/M2 | OXYGEN SATURATION: 95 % | DIASTOLIC BLOOD PRESSURE: 70 MMHG | WEIGHT: 219 LBS | SYSTOLIC BLOOD PRESSURE: 124 MMHG

## 2019-03-25 DIAGNOSIS — I20.9 ANGINA PECTORIS (HCC): ICD-10-CM

## 2019-03-25 DIAGNOSIS — I25.10 CORONARY ARTERIOSCLEROSIS: Primary | ICD-10-CM

## 2019-03-25 PROCEDURE — 99214 OFFICE O/P EST MOD 30 MIN: CPT | Performed by: FAMILY MEDICINE

## 2019-03-25 RX ORDER — NITROGLYCERIN 0.4 MG/1
0.4 TABLET SUBLINGUAL
Qty: 20 TABLET | Refills: 5 | Status: SHIPPED | OUTPATIENT
Start: 2019-03-25 | End: 2020-12-29

## 2019-03-25 RX ORDER — TAMSULOSIN HYDROCHLORIDE 0.4 MG/1
1 CAPSULE ORAL NIGHTLY
Refills: 11 | COMMUNITY
Start: 2019-02-27

## 2019-03-25 RX ORDER — FUROSEMIDE 40 MG/1
40 TABLET ORAL DAILY
COMMUNITY
End: 2021-03-16 | Stop reason: SDUPTHER

## 2019-03-25 RX ORDER — PRASUGREL 10 MG/1
TABLET, FILM COATED ORAL
Refills: 0 | COMMUNITY
Start: 2019-03-18 | End: 2019-05-15 | Stop reason: SDUPTHER

## 2019-03-25 RX ORDER — RANOLAZINE 1000 MG/1
TABLET, FILM COATED, EXTENDED RELEASE ORAL
Refills: 0 | COMMUNITY
Start: 2019-03-18 | End: 2019-06-17 | Stop reason: SDUPTHER

## 2019-03-25 NOTE — PROGRESS NOTES
Subjective   Dale Mann is a 78 y.o. male who presents to the office for follow-up after hospitalization.  He came by the office with chest pain last week and was sent directly to ER.  He was admitted to the hospital on For 5 days.  He saw the cardiologist and reports that he was worked up for a blood clot which was reportedly negative, and he had a stress test.  He is still seeing the cardiologist and has another appointment next week.  He is still having some intermittent chest pains but is on nitroglycerin, Ranexa, and Imdur.    c      Chest Pain    This is a recurrent problem. The current episode started 1 to 4 weeks ago. The onset quality is undetermined. The problem occurs 2 to 4 times per day. The problem has been waxing and waning. The pain is present in the substernal region. The pain is at a severity of 8/10. The pain is severe. The quality of the pain is described as heavy, pressure, squeezing and tightness. The pain radiates to the left shoulder. Associated symptoms include exertional chest pressure, malaise/fatigue, nausea, shortness of breath and weakness. Pertinent negatives include no abdominal pain, diaphoresis, dizziness, fever, headaches, numbness or vomiting. The pain is aggravated by exertion. He has tried nitroglycerin and rest for the symptoms. The treatment provided mild relief. Risk factors include male gender.   His past medical history is significant for CAD.   Pertinent negatives for past medical history include no seizures.   His family medical history is significant for CAD. Prior diagnostic workup includes Persantine thallium, chest x-ray and echocardiogram.        The following portions of the patient's history were reviewed and updated as appropriate: allergies, current medications, past family history, past medical history, past social history, past surgical history and problem list.    Review of Systems   Constitutional: Positive for malaise/fatigue. Negative for activity  change, appetite change, diaphoresis, fatigue, fever and unexpected weight change.   HENT: Negative for congestion, ear pain, hearing loss, sinus pressure, sore throat, tinnitus, trouble swallowing and voice change.    Eyes: Negative.    Respiratory: Positive for shortness of breath.    Cardiovascular: Positive for chest pain.   Gastrointestinal: Positive for nausea. Negative for abdominal distention, abdominal pain, blood in stool, constipation, diarrhea and vomiting.   Endocrine: Negative.    Genitourinary: Negative for decreased urine volume, dysuria, frequency, hematuria and urgency.   Musculoskeletal: Negative.    Skin: Positive for rash.   Allergic/Immunologic: Negative.    Neurological: Positive for weakness. Negative for dizziness, tremors, seizures, syncope, speech difficulty, numbness and headaches.   Hematological: Negative.    Psychiatric/Behavioral: Negative for dysphoric mood and sleep disturbance. The patient is not nervous/anxious.    All other systems reviewed and are negative.      Objective   Physical Exam   Constitutional: He is oriented to person, place, and time. He appears well-developed and well-nourished. No distress.   HENT:   Head: Normocephalic and atraumatic.   Nose: Nose normal.   Mouth/Throat: Oropharynx is clear and moist.   Eyes: Conjunctivae and EOM are normal. Pupils are equal, round, and reactive to light.   Neck: Normal range of motion. Neck supple. No JVD present. No tracheal deviation present. No thyromegaly present.   Cardiovascular: Normal rate, regular rhythm, normal heart sounds and intact distal pulses.   No murmur heard.  Pulmonary/Chest: Effort normal and breath sounds normal. He has no wheezes. He exhibits no tenderness.   Abdominal: Soft. Bowel sounds are normal. He exhibits no distension and no mass. There is no tenderness.   Musculoskeletal: Normal range of motion. He exhibits no edema or tenderness.   Lymphadenopathy:     He has no cervical adenopathy.    Neurological: He is alert and oriented to person, place, and time. He exhibits normal muscle tone. Coordination normal.   Skin: Skin is warm and dry. No rash noted. There is erythema. No pallor.   Psychiatric: He has a normal mood and affect.   Nursing note and vitals reviewed.      Current Outpatient Medications:   •  aspirin 81 MG EC tablet, Take 81 mg by mouth Daily. Last dose 11/27/17, Disp: , Rfl:   •  furosemide (LASIX) 40 MG tablet, Take 40 mg by mouth Daily., Disp: , Rfl:   •  isosorbide mononitrate (IMDUR) 60 MG 24 hr tablet, Take 60 mg by mouth Daily., Disp: , Rfl:   •  nitroglycerin (NITROSTAT) 0.4 MG SL tablet, Place 1 tablet under the tongue Every 5 (Five) Minutes As Needed for Chest Pain. Take no more than 3 doses in 15 minutes., Disp: 20 tablet, Rfl: 5  •  pantoprazole (PROTONIX) 40 MG EC tablet, Take 1 tablet by mouth 2 (Two) Times a Day., Disp: 60 tablet, Rfl: 5  •  polyethyl glycol-propyl glycol (SYSTANE) 0.4-0.3 % solution ophthalmic solution, Administer 2 drops to both eyes Every 1 (One) Hour As Needed., Disp: , Rfl:   •  potassium chloride (K-DUR,KLOR-CON) 20 MEQ CR tablet, TAKE 1 TABLET BY MOUTH DAILY., Disp: 60 tablet, Rfl: 5  •  psyllium (METAMUCIL SMOOTH TEXTURE) 28 % packet, Take 1 packet by mouth Daily., Disp: 30 each, Rfl: 11  •  simvastatin (ZOCOR) 80 MG tablet, 1 TABLET(S) BY MOUTH AT BEDTIME, Disp: 90 tablet, Rfl: 2  •  sucralfate (CARAFATE) 1 g tablet, Take 1 tablet by mouth 4 (Four) Times a Day., Disp: 120 tablet, Rfl: 5  •  Testosterone Cypionate 200 MG/ML kit, Inject 1 mL into the appropriate muscle as directed by prescriber Every 14 (Fourteen) Days., Disp: 2 each, Rfl: 5  •  metoprolol tartrate (LOPRESSOR) 25 MG tablet, , Disp: , Rfl: 0  •  prasugrel (EFFIENT) 10 MG tablet, , Disp: , Rfl: 0  •  RANEXA 1000 MG 12 hr tablet, , Disp: , Rfl: 0  •  tamsulosin (FLOMAX) 0.4 MG capsule 24 hr capsule, , Disp: , Rfl: 11    Current Facility-Administered Medications:   •  Testosterone  Cypionate (DEPOTESTOTERONE CYPIONATE) injection 200 mg, 200 mg, Intramuscular, Q14 Days, Dunia Cummings MD, 200 mg at 02/27/19 1139      Assessment/Plan   Dlae was seen today for follow-up.    Diagnoses and all orders for this visit:    Coronary arteriosclerosis    Angina pectoris (CMS/HCC)    Other orders  -     nitroglycerin (NITROSTAT) 0.4 MG SL tablet; Place 1 tablet under the tongue Every 5 (Five) Minutes As Needed for Chest Pain. Take no more than 3 doses in 15 minutes.    Continue with nitroglycerin, Ranexa, Imdur, and other medicines as listed above and follow-up with cardiology next week, seek immediate medical attention for worsening chest pain  Reviewed hospital findings, discharge summary, and test results.          This document has been electronically signed by Dunia Cummings MD on March 25, 2019 1:42 PM

## 2019-03-26 ENCOUNTER — OFFICE VISIT (OUTPATIENT)
Dept: GASTROENTEROLOGY | Facility: CLINIC | Age: 79
End: 2019-03-26

## 2019-03-26 VITALS
HEART RATE: 63 BPM | BODY MASS INDEX: 32.38 KG/M2 | DIASTOLIC BLOOD PRESSURE: 62 MMHG | HEIGHT: 69 IN | SYSTOLIC BLOOD PRESSURE: 132 MMHG | WEIGHT: 218.6 LBS

## 2019-03-26 DIAGNOSIS — K21.00 GASTROESOPHAGEAL REFLUX DISEASE WITH ESOPHAGITIS: Primary | ICD-10-CM

## 2019-03-26 PROCEDURE — 99214 OFFICE O/P EST MOD 30 MIN: CPT | Performed by: NURSE PRACTITIONER

## 2019-03-26 RX ORDER — DEXLANSOPRAZOLE 60 MG/1
60 CAPSULE, DELAYED RELEASE ORAL DAILY
Qty: 30 CAPSULE | Refills: 5 | Status: SHIPPED | OUTPATIENT
Start: 2019-03-26 | End: 2019-05-09 | Stop reason: SDUPTHER

## 2019-03-26 NOTE — PATIENT INSTRUCTIONS
Heartburn  Heartburn is a type of pain or discomfort that can happen in the throat or chest. It is often described as a burning pain. It may also cause a bad taste in the mouth. Heartburn may feel worse when you lie down or bend over, and it is often worse at night. Heartburn may be caused by stomach contents that move back up into the esophagus (reflux).  Follow these instructions at home:  Take these actions to decrease your discomfort and to help avoid complications.  Diet  · Follow a diet as recommended by your health care provider. This may involve avoiding foods and drinks such as:  ? Coffee and tea (with or without caffeine).  ? Drinks that contain alcohol.  ? Energy drinks and sports drinks.  ? Carbonated drinks or sodas.  ? Chocolate and cocoa.  ? Peppermint and mint flavorings.  ? Garlic and onions.  ? Horseradish.  ? Spicy and acidic foods, including peppers, chili powder, gaitan powder, vinegar, hot sauces, and barbecue sauce.  ? Citrus fruit juices and citrus fruits, such as oranges, seferino, and limes.  ? Tomato-based foods, such as red sauce, chili, salsa, and pizza with red sauce.  ? Fried and fatty foods, such as donuts, french fries, potato chips, and high-fat dressings.  ? High-fat meats, such as hot dogs and fatty cuts of red and white meats, such as rib eye steak, sausage, ham, and villela.  ? High-fat dairy items, such as whole milk, butter, and cream cheese.  · Eat small, frequent meals instead of large meals.  · Avoid drinking large amounts of liquid with your meals.  · Avoid eating meals during the 2-3 hours before bedtime.  · Avoid lying down right after you eat.  · Do not exercise right after you eat.  General instructions  · Pay attention to any changes in your symptoms.  · Take over-the-counter and prescription medicines only as told by your health care provider. Do not take aspirin, ibuprofen, or other NSAIDs unless your health care provider told you to do so.  · Do not use any tobacco  products, including cigarettes, chewing tobacco, and e-cigarettes. If you need help quitting, ask your health care provider.  · Wear loose-fitting clothing. Do not wear anything tight around your waist that causes pressure on your abdomen.  · Raise (elevate) the head of your bed about 6 inches (15 cm).  · Try to reduce your stress, such as with yoga or meditation. If you need help reducing stress, ask your health care provider.  · If you are overweight, reduce your weight to an amount that is healthy for you. Ask your health care provider for guidance about a safe weight loss goal.  · Keep all follow-up visits as told by your health care provider. This is important.  Contact a health care provider if:  · You have new symptoms.  · You have unexplained weight loss.  · You have difficulty swallowing, or it hurts to swallow.  · You have wheezing or a persistent cough.  · Your symptoms do not improve with treatment.  · You have frequent heartburn for more than two weeks.  Get help right away if:  · You have pain in your arms, neck, jaw, teeth, or back.  · You feel sweaty, dizzy, or light-headed.  · You have chest pain or shortness of breath.  · You vomit and your vomit looks like blood or coffee grounds.  · Your stool is bloody or black.  This information is not intended to replace advice given to you by your health care provider. Make sure you discuss any questions you have with your health care provider.  Document Released: 05/06/2010 Document Revised: 05/25/2017 Document Reviewed: 04/13/2016  [employer] Patient Education © 2019 [employer]
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
## 2019-03-29 ENCOUNTER — CLINICAL SUPPORT (OUTPATIENT)
Dept: FAMILY MEDICINE CLINIC | Facility: CLINIC | Age: 79
End: 2019-03-29

## 2019-03-29 DIAGNOSIS — E34.9 TESTOSTERONE DEFICIENCY: ICD-10-CM

## 2019-03-29 DIAGNOSIS — E34.9 TESTOSTERONE DEFICIENCY: Primary | ICD-10-CM

## 2019-03-29 PROCEDURE — 96372 THER/PROPH/DIAG INJ SC/IM: CPT | Performed by: FAMILY MEDICINE

## 2019-03-29 RX ADMIN — TESTOSTERONE CYPIONATE 200 MG: 200 INJECTION, SOLUTION INTRAMUSCULAR at 11:30

## 2019-04-11 DIAGNOSIS — E34.9 TESTOSTERONE DEFICIENCY: ICD-10-CM

## 2019-04-11 RX ORDER — METOPROLOL TARTRATE 50 MG/1
TABLET, FILM COATED ORAL
Qty: 30 TABLET | Refills: 5 | Status: SHIPPED | OUTPATIENT
Start: 2019-04-11

## 2019-04-11 RX ORDER — TESTOSTERONE CYPIONATE 200 MG/ML
INJECTION, SOLUTION INTRAMUSCULAR
Qty: 2 ML | Refills: 2 | Status: SHIPPED | OUTPATIENT
Start: 2019-04-11 | End: 2019-07-11 | Stop reason: SDUPTHER

## 2019-04-11 RX ORDER — PANTOPRAZOLE SODIUM 40 MG/1
TABLET, DELAYED RELEASE ORAL
Qty: 90 TABLET | Refills: 1 | Status: SHIPPED | OUTPATIENT
Start: 2019-04-11 | End: 2019-04-18 | Stop reason: SDUPTHER

## 2019-04-12 ENCOUNTER — LAB (OUTPATIENT)
Dept: LAB | Facility: OTHER | Age: 79
End: 2019-04-12

## 2019-04-12 DIAGNOSIS — E34.9 TESTOSTERONE DEFICIENCY: ICD-10-CM

## 2019-04-12 PROCEDURE — 84403 ASSAY OF TOTAL TESTOSTERONE: CPT | Performed by: FAMILY MEDICINE

## 2019-04-12 PROCEDURE — 84402 ASSAY OF FREE TESTOSTERONE: CPT | Performed by: FAMILY MEDICINE

## 2019-04-12 PROCEDURE — 36415 COLL VENOUS BLD VENIPUNCTURE: CPT | Performed by: FAMILY MEDICINE

## 2019-04-15 ENCOUNTER — CLINICAL SUPPORT (OUTPATIENT)
Dept: FAMILY MEDICINE CLINIC | Facility: CLINIC | Age: 79
End: 2019-04-15

## 2019-04-15 DIAGNOSIS — R79.89 LOW TESTOSTERONE: ICD-10-CM

## 2019-04-15 PROCEDURE — 96372 THER/PROPH/DIAG INJ SC/IM: CPT | Performed by: FAMILY MEDICINE

## 2019-04-15 RX ADMIN — TESTOSTERONE CYPIONATE 200 MG: 200 INJECTION, SOLUTION INTRAMUSCULAR at 11:17

## 2019-04-16 LAB
TESTOST FREE SERPL-MCNC: 7.7 PG/ML (ref 6.6–18.1)
TESTOST SERPL-MCNC: 517 NG/DL (ref 264–916)

## 2019-04-18 RX ORDER — PANTOPRAZOLE SODIUM 40 MG/1
40 TABLET, DELAYED RELEASE ORAL 2 TIMES DAILY
Qty: 180 TABLET | Refills: 2 | Status: SHIPPED | OUTPATIENT
Start: 2019-04-18 | End: 2019-05-09

## 2019-04-29 ENCOUNTER — CLINICAL SUPPORT (OUTPATIENT)
Dept: FAMILY MEDICINE CLINIC | Facility: CLINIC | Age: 79
End: 2019-04-29

## 2019-04-29 DIAGNOSIS — D64.89 OTHER SPECIFIED ANEMIAS: ICD-10-CM

## 2019-04-29 PROCEDURE — 96372 THER/PROPH/DIAG INJ SC/IM: CPT | Performed by: FAMILY MEDICINE

## 2019-04-29 RX ORDER — SIMVASTATIN 80 MG
TABLET ORAL
Qty: 90 TABLET | Refills: 2 | Status: SHIPPED | OUTPATIENT
Start: 2019-04-29 | End: 2023-02-09 | Stop reason: SDUPTHER

## 2019-04-29 RX ADMIN — TESTOSTERONE CYPIONATE 200 MG: 200 INJECTION, SOLUTION INTRAMUSCULAR at 11:57

## 2019-05-09 ENCOUNTER — OFFICE VISIT (OUTPATIENT)
Dept: GASTROENTEROLOGY | Facility: CLINIC | Age: 79
End: 2019-05-09

## 2019-05-09 VITALS
WEIGHT: 217 LBS | SYSTOLIC BLOOD PRESSURE: 140 MMHG | HEART RATE: 72 BPM | HEIGHT: 69 IN | DIASTOLIC BLOOD PRESSURE: 64 MMHG | BODY MASS INDEX: 32.14 KG/M2

## 2019-05-09 DIAGNOSIS — K21.00 GASTROESOPHAGEAL REFLUX DISEASE WITH ESOPHAGITIS: Primary | ICD-10-CM

## 2019-05-09 PROCEDURE — 99213 OFFICE O/P EST LOW 20 MIN: CPT | Performed by: NURSE PRACTITIONER

## 2019-05-09 RX ORDER — DEXLANSOPRAZOLE 60 MG/1
60 CAPSULE, DELAYED RELEASE ORAL DAILY
Qty: 30 CAPSULE | Refills: 5 | Status: SHIPPED | OUTPATIENT
Start: 2019-05-09 | End: 2019-11-05

## 2019-05-09 NOTE — PROGRESS NOTES
Chief Complaint   Patient presents with   • Heartburn       Subjective    Dale Mann is a 78 y.o. male. he is here today for follow-up.  78-year-old male presents to discuss reflux.  At last visit we started patient on Dexilant with plan to repeat EGD if no improvement.  Patient reports reflux symptoms have been well controlled.    Heartburn   He complains of a hoarse voice. He reports no abdominal pain, no belching, no chest pain, no choking, no coughing, no dysphagia, no early satiety, no globus sensation, no heartburn, no nausea or no sore throat. This is a chronic problem. The current episode started more than 1 year ago. The problem has been waxing and waning. The symptoms are aggravated by certain foods and exertion. Pertinent negatives include no fatigue. He has tried a PPI for the symptoms. The treatment provided significant (Controlled with dexilant ) relief. Past procedures include an EGD.     Plan; continue dexilant follow-up in 6 months return office sooner if needed       The following portions of the patient's history were reviewed and updated as appropriate:   Past Medical History:   Diagnosis Date   • Acid reflux    • Arthritis    • Chronic anemia    • Chronic gastritis    • Coronary arteriosclerosis     hx of CABG then stent placement,  is followed by dr pickard in [place]   • Diverticulosis of sigmoid colon    • Dysuria    • Essential hypertension    • Fever    • Hematochezia    • High cholesterol    • History of peptic ulcer    • [place] (hard of hearing)     wearing heading aids david   • Hyperkalemia    • Macular degeneration     left eye only   • Old myocardial infarction    • Other specified anemias    • Screening for malignant neoplasm of prostate    • Upper respiratory infection    • Urinary tract infectious disease      Past Surgical History:   Procedure Laterality Date   • 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 A single polyp was found in the sigmoid colon. Removed by cold biopsy polypectomy. Internal and external hemorrhoids found.   • COLONOSCOPY N/A 5/16/2018    Procedure: COLONOSCOPY;  Surgeon: Ricardo Chamberlain MD;  Location: [facility] ENDOSCOPY;  Service: Gastroenterology   • CORONARY ANGIOPLASTY  2007   • CORONARY ARTERY BYPASS GRAFT     • ENDOSCOPY N/A 5/16/2018    Procedure: ESOPHAGOGASTRODUODENOSCOPY;  Surgeon: Ricardo Chamberlain MD;  Location: [facility] ENDOSCOPY;  Service: Gastroenterology   • INGUINAL HERNIA REPAIR Left 12/8/2017    Procedure: OPEN LEFT INGUINAL HERNIA REPAIR WITH MESH;  Surgeon: Dale Diehl MD;  Location: [facility] OR;  Service:    • PHALLOPLASTY, CIRCUMCISION N/A 5/24/2017    Procedure: CIRCUMCISION;  Surgeon: Dale Lezama MD;  Location: [facility] OR;  Service:    • UPPER GASTROINTESTINAL ENDOSCOPY  01/14/2015    Normal esophagus. Gastritis was found in the stomach. Biopsy taken. Duodenitis was found in the duodenum. Biopsy taken.     Family History   Problem Relation Age of Onset   • Colon polyps Other    • Heart disease Other        Prior to Admission medications    Medication Sig Start Date End Date Taking? Authorizing Provider   aspirin 81 MG EC tablet Take 81 mg by mouth Daily. Last dose 11/27/17   Yes Edgar Parada MD   dexlansoprazole (DEXILANT) 60 MG capsule Take 1 capsule by mouth Daily for 180 days. 3/26/19 9/22/19 Yes Toña Wesley APRN   furosemide (LASIX) 40 MG tablet Take 40 mg by mouth Daily.   Yes Edgar Parada MD   isosorbide mononitrate (IMDUR) 60 MG 24 hr tablet Take 60 mg by mouth Daily.   Yes Edgar Parada MD   metoprolol tartrate (LOPRESSOR) 25 MG tablet  3/18/19  Yes Edgar Parada MD   metoprolol tartrate (LOPRESSOR) 50 MG tablet TAKE ONE TABLET BY MOUTH EVERY DAY REPLACES ATENOLOL 4/11/19  Yes Dunia Cummings MD  "  nitroglycerin (NITROSTAT) 0.4 MG SL tablet Place 1 tablet under the tongue Every 5 (Five) Minutes As Needed for Chest Pain. Take no more than 3 doses in 15 minutes. 3/25/19  Yes Dunia Cummings MD   pantoprazole (PROTONIX) 40 MG EC tablet Take 1 tablet by mouth 2 (Two) Times a Day. 4/18/19  Yes Dunia Cummings MD   polyethyl glycol-propyl glycol (SYSTANE) 0.4-0.3 % solution ophthalmic solution Administer 2 drops to both eyes Every 1 (One) Hour As Needed.   Yes Edgar Parada MD   potassium chloride (K-DUR,KLOR-CON) 20 MEQ CR tablet TAKE 1 TABLET BY MOUTH DAILY. 1/15/19  Yes Dunia Cummings MD   prasugrel (EFFIENT) 10 MG tablet  3/18/19  Yes Edgar Parada MD   psyllium (METAMUCIL SMOOTH TEXTURE) 28 % packet Take 1 packet by mouth Daily. 5/25/18  Yes Toña Wesley APRN   RANEXA 1000 MG 12 hr tablet  3/18/19  Yes Edgar Parada MD   simvastatin (ZOCOR) 80 MG tablet 1 TABLET(S) BY MOUTH AT BEDTIME 4/29/19  Yes Dunia Cummings MD   sucralfate (CARAFATE) 1 g tablet Take 1 tablet by mouth 4 (Four) Times a Day. 9/5/18  Yes Toña Wesley APRN   tamsulosin (FLOMAX) 0.4 MG capsule 24 hr capsule  2/27/19  Yes Edgar Parada MD   Testosterone Cypionate (DEPOTESTOTERONE CYPIONATE) 200 MG/ML injection INJECT 1 ML INTO THE APPROPRIATE MUSCLE AS DIRECTED BY PRESCRIBER EVERY 14 (FOURTEEN) DAYS. 4/11/19  Yes Dunia Cummings MD     Allergies   Allergen Reactions   • Norvasc [Amlodipine Besylate] Anaphylaxis   • Ticagrelor Anaphylaxis   • Coumadin [Warfarin Sodium] Other (See Comments)     Excessive bleeding   • Lipitor [Atorvastatin] Other (See Comments)     \"affected movement of heart\"   • Plavix [Clopidogrel Bisulfate] Rash     Social History     Socioeconomic History   • Marital status:      Spouse name: Not on file   • Number of children: Not on file   • Years of education: Not on file   • Highest education level: Not on file   Tobacco Use   • Smoking status: Former Smoker     Last " "attempt to quit: 1987     Years since quittin.3   • Smokeless tobacco: Never Used   Substance and Sexual Activity   • Alcohol use: Yes     Comment: socially   • Drug use: No   • Sexual activity: Defer       Review of Systems  Review of Systems   Constitutional: Negative for activity change, appetite change, chills, diaphoresis, fatigue, fever and unexpected weight change.   HENT: Positive for hoarse voice. Negative for sore throat and trouble swallowing.    Respiratory: Negative for cough, choking and shortness of breath.    Cardiovascular: Negative for chest pain.   Gastrointestinal: Negative for abdominal distention, abdominal pain, anal bleeding, blood in stool, constipation, diarrhea, dysphagia, heartburn, nausea, rectal pain and vomiting.   Musculoskeletal: Negative for arthralgias.   Skin: Negative for pallor.   Neurological: Negative for light-headedness.        /64 (BP Location: Left arm)   Pulse 72   Ht 175.3 cm (69\")   Wt 98.4 kg (217 lb)   BMI 32.05 kg/m²     Objective    Physical Exam   Constitutional: He is oriented to person, place, and time. He appears well-developed and well-nourished. He is cooperative. No distress.   HENT:   Head: Normocephalic and atraumatic.   Neck: Normal range of motion. Neck supple. No thyromegaly present.   Cardiovascular: Normal rate, regular rhythm and normal heart sounds.   Pulmonary/Chest: Effort normal and breath sounds normal. He has no wheezes. He has no rhonchi. He has no rales.   Abdominal: Soft. Normal appearance and bowel sounds are normal. He exhibits no shifting dullness, no distension, no fluid wave and no ascites. There is no hepatosplenomegaly. There is no tenderness. There is no rigidity and no guarding. No hernia.   Lymphadenopathy:     He has no cervical adenopathy.   Neurological: He is alert and oriented to person, place, and time.   Skin: Skin is warm, dry and intact. No rash noted. No pallor.   Psychiatric: He has a normal mood and " affect. His speech is normal.     Lab on 04/12/2019   Component Date Value Ref Range Status   • Testosterone, Total 04/12/2019 517  264 - 916 ng/dL Final    Adult male reference interval is based on a population of  healthy nonobese males (BMI <30) between 19 and 39 years old.  Juanito et.al. JCEM 2017,102;1013-0194. PMID: 62807071.   • Testosterone, Free 04/12/2019 7.7  6.6 - 18.1 pg/mL Final     Assessment/Plan      1. Gastroesophageal reflux disease with esophagitis    .       Orders placed during this encounter include:  No orders of the defined types were placed in this encounter.      * Surgery not found *    Review and/or summary of lab tests, radiology, procedures, medications. Review and summary of old records and obtaining of history. The risks and benefits of my recommendations, as well as other treatment options were discussed with the patient today. Questions were answered.    No orders of the defined types were placed in this encounter.      Follow-up: No Follow-up on file.          This document has been electronically signed by ZENAIDA Montero on May 9, 2019 1:45 PM             Results for orders placed or performed in visit on 04/12/19   Testosterone, Free, Total   Result Value Ref Range    Testosterone, Total 517 264 - 916 ng/dL    Testosterone, Free 7.7 6.6 - 18.1 pg/mL   Results for orders placed or performed in visit on 10/08/18   CBC Auto Differential   Result Value Ref Range    WBC 6.12 3.20 - 9.80 10*3/mm3    RBC 4.89 4.37 - 5.74 10*6/mm3    Hemoglobin 14.7 13.7 - 17.3 g/dL    Hematocrit 44.4 39.0 - 49.0 %    MCV 90.8 80.0 - 98.0 fL    MCH 30.1 26.5 - 34.0 pg    MCHC 33.1 31.5 - 36.3 g/dL    RDW 17.6 (H) 11.5 - 14.5 %    RDW-SD 57.2 (H) 35.1 - 43.9 fl    MPV 8.1 8.0 - 12.0 fL    Platelets 127 (L) 150 - 450 10*3/mm3    Neutrophil % 58.2 37.0 - 80.0 %    Lymphocyte % 24.0 10.0 - 50.0 %    Monocyte % 12.7 (H) 0.0 - 12.0 %    Eosinophil % 4.9 0.0 - 7.0 %    Basophil % 0.2 0.0 - 2.0 %     Neutrophils, Absolute 3.56 2.00 - 8.60 10*3/mm3    Lymphocytes, Absolute 1.47 0.60 - 4.20 10*3/mm3    Monocytes, Absolute 0.78 0.00 - 0.90 10*3/mm3    Eosinophils, Absolute 0.30 0.00 - 0.70 10*3/mm3    Basophils, Absolute 0.01 0.00 - 0.20 10*3/mm3   Testosterone, Free, Total   Result Value Ref Range    Testosterone, Total 479 264 - 916 ng/dL    Testosterone, Free 6.9 6.6 - 18.1 pg/mL   TSH   Result Value Ref Range    TSH 1.430 0.460 - 4.680 mIU/mL   T4, Free   Result Value Ref Range    Free T4 1.05 0.78 - 2.19 ng/dL   Lipid Panel   Result Value Ref Range    Total Cholesterol 108 (L) 150 - 200 mg/dL    Triglycerides 127 <=150 mg/dL    HDL Cholesterol 45 40 - 59 mg/dL    LDL Cholesterol  38 <=100 mg/dL    VLDL Cholesterol 25.4 mg/dL    LDL/HDL Ratio 0.84 0.00 - 3.55   Comprehensive Metabolic Panel   Result Value Ref Range    Glucose 114 (H) 74 - 99 mg/dL    BUN 16 9 - 20 mg/dL    Creatinine 1.31 (H) 0.66 - 1.25 mg/dL    Sodium 141 137 - 145 mmol/L    Potassium 4.3 3.4 - 5.0 mmol/L    Chloride 104 98 - 107 mmol/L    CO2 29.0 22.0 - 30.0 mmol/L    Calcium 9.2 8.4 - 10.2 mg/dL    Total Protein 7.5 6.3 - 8.2 g/dL    Albumin 4.00 3.50 - 5.00 g/dL    ALT (SGPT) 16 <=50 U/L    AST (SGOT) 22 17 - 59 U/L    Alkaline Phosphatase 72 38 - 126 U/L    Total Bilirubin 1.1 0.2 - 1.3 mg/dL    eGFR Non African Amer 53 42 - 98 mL/min/1.73    Globulin 3.5 2.3 - 3.5 gm/dL    A/G Ratio 1.1 1.1 - 1.8 g/dL    BUN/Creatinine Ratio 12.2 7.0 - 25.0    Anion Gap 8.0 5.0 - 15.0 mmol/L   Results for orders placed or performed during the hospital encounter of 05/16/18   Tissue Pathology Exam   Result Value Ref Range    Case Report       Surgical Pathology Report                         Case: [id number]                                  Authorizing Provider:  Ricardo Chamberlain MD         Collected:           05/16/2018 02:56 PM          Ordering Location:     [facility]             Received:            05/17/2018 08:02 AM                             "     [facility]                                                     Pathologist:           Abhinav Lynn MD                                                          Specimen:    Gastric, Antrum, antrum bx                                                                 Final Diagnosis       MUCOSA, ANTRUM OF STOMACH:  CHRONIC GASTRITIS.  NEGATIVE FOR HELICOBACTER PYLORI (HP IMMUNOSTAIN).      Comment       Helicobacter pylori (HP) immunostain is performed because an appropriate inflammatory milieu is present and organisms are not seen on H & E stained slides.    HP immunostain was developed and its performance characteristics determined by [facility].  It has not been cleared or approved by the U.S. Food and Drug Administration.  The FDA has determined that such clearance or approval is not necessary.  This test is used for clinical purposes.  It should not be regarded as investigational or for research.  This laboratory is certified under the Clinical Laboratory Improvement Amendments of 1988 (CLIA-88) as qualified to perform high complexity clinical laboratory testing.          Gross Description       The container is labeled \"antrum of stomach\" and has a nodular fragment of white soft tissue measuring 0.4 cm in greatest dimension.  It is entirely embedded as 1A.      Embedded Images     Results for orders placed or performed in visit on 04/24/18   CBC Auto Differential   Result Value Ref Range    WBC 5.97 3.20 - 9.80 10*3/mm3    RBC 4.63 4.37 - 5.74 10*6/mm3    Hemoglobin 14.7 13.7 - 17.3 g/dL    Hematocrit 43.7 39.0 - 49.0 %    MCV 94.4 80.0 - 98.0 fL    MCH 31.7 26.5 - 34.0 pg    MCHC 33.6 31.5 - 36.3 g/dL    RDW 14.7 (H) 11.5 - 14.5 %    RDW-SD 48.9 (H) 35.1 - 43.9 fl    MPV 8.6 8.0 - 12.0 fL    Platelets 162 150 - 450 10*3/mm3    Neutrophil % 57.4 37.0 - 80.0 %    Lymphocyte % 25.3 10.0 - 50.0 %    Monocyte % 9.5 0.0 - 12.0 %    Eosinophil % 7.5 (H) 0.0 - " 7.0 %    Basophil % 0.3 0.0 - 2.0 %    Neutrophils, Absolute 3.42 2.00 - 8.60 10*3/mm3    Lymphocytes, Absolute 1.51 0.60 - 4.20 10*3/mm3    Monocytes, Absolute 0.57 0.00 - 0.90 10*3/mm3    Eosinophils, Absolute 0.45 0.00 - 0.70 10*3/mm3    Basophils, Absolute 0.02 0.00 - 0.20 10*3/mm3   Testosterone, Free, Total   Result Value Ref Range    Testosterone, Total 190 (L) 264 - 916 ng/dL    Testosterone, Free 2.3 (L) 6.6 - 18.1 pg/mL   PSA DIAGNOSTIC   Result Value Ref Range    PSA 1.410 0.000 - 4.000 ng/mL   Results for orders placed or performed in visit on 12/04/17   MRSA Screen, PCR - Swab, Nares   Result Value Ref Range    MRSA, PCR Negative Negative   Basic Metabolic Panel   Result Value Ref Range    Glucose 90 60 - 100 mg/dL    BUN 18 7 - 21 mg/dL    Creatinine 1.16 0.70 - 1.30 mg/dL    Sodium 139 137 - 145 mmol/L    Potassium 4.3 3.5 - 5.1 mmol/L    Chloride 99 95 - 110 mmol/L    CO2 31.0 22.0 - 31.0 mmol/L    Calcium 9.7 8.4 - 10.2 mg/dL    eGFR Non African Amer 61 42 - 98 mL/min/1.73    BUN/Creatinine Ratio 15.5 7.0 - 25.0    Anion Gap 9.0 5.0 - 15.0 mmol/L   Results for orders placed or performed in visit on 09/19/17   Basic Metabolic Panel   Result Value Ref Range    Glucose 102 (H) 70 - 100 mg/dL    BUN 20 8 - 25 mg/dL    Creatinine 1.20 0.40 - 1.30 mg/dL    Sodium 139 134 - 146 mmol/L    Potassium 4.4 3.4 - 5.4 mmol/L    Chloride 102 100 - 112 mmol/L    CO2 28.0 20.0 - 32.0 mmol/L    Calcium 9.1 8.4 - 10.8 mg/dL    eGFR Non African Amer 59 42 - 98 mL/min/1.73    BUN/Creatinine Ratio 16.7 7.0 - 25.0    Anion Gap 9.0 5.0 - 15.0 mmol/L     *Note: Due to a large number of results and/or encounters for the requested time period, some results have not been displayed. A complete set of results can be found in Results Review.

## 2019-05-09 NOTE — PATIENT INSTRUCTIONS
Heartburn  Heartburn is a type of pain or discomfort that can happen in the throat or chest. It is often described as a burning pain. It may also cause a bad taste in the mouth. Heartburn may feel worse when you lie down or bend over, and it is often worse at night. Heartburn may be caused by stomach contents that move back up into the esophagus (reflux).  Follow these instructions at home:  Take these actions to decrease your discomfort and to help avoid complications.  Diet  · Follow a diet as recommended by your health care provider. This may involve avoiding foods and drinks such as:  ? Coffee and tea (with or without caffeine).  ? Drinks that contain alcohol.  ? Energy drinks and sports drinks.  ? Carbonated drinks or sodas.  ? Chocolate and cocoa.  ? Peppermint and mint flavorings.  ? Garlic and onions.  ? Horseradish.  ? Spicy and acidic foods, including peppers, chili powder, gaitan powder, vinegar, hot sauces, and barbecue sauce.  ? Citrus fruit juices and citrus fruits, such as oranges, seferino, and limes.  ? Tomato-based foods, such as red sauce, chili, salsa, and pizza with red sauce.  ? Fried and fatty foods, such as donuts, french fries, potato chips, and high-fat dressings.  ? High-fat meats, such as hot dogs and fatty cuts of red and white meats, such as rib eye steak, sausage, ham, and villela.  ? High-fat dairy items, such as whole milk, butter, and cream cheese.  · Eat small, frequent meals instead of large meals.  · Avoid drinking large amounts of liquid with your meals.  · Avoid eating meals during the 2-3 hours before bedtime.  · Avoid lying down right after you eat.  · Do not exercise right after you eat.  General instructions  · Pay attention to any changes in your symptoms.  · Take over-the-counter and prescription medicines only as told by your health care provider. Do not take aspirin, ibuprofen, or other NSAIDs unless your health care provider told you to do so.  · Do not use any tobacco  products, including cigarettes, chewing tobacco, and e-cigarettes. If you need help quitting, ask your health care provider.  · Wear loose-fitting clothing. Do not wear anything tight around your waist that causes pressure on your abdomen.  · Raise (elevate) the head of your bed about 6 inches (15 cm).  · Try to reduce your stress, such as with yoga or meditation. If you need help reducing stress, ask your health care provider.  · If you are overweight, reduce your weight to an amount that is healthy for you. Ask your health care provider for guidance about a safe weight loss goal.  · Keep all follow-up visits as told by your health care provider. This is important.  Contact a health care provider if:  · You have new symptoms.  · You have unexplained weight loss.  · You have difficulty swallowing, or it hurts to swallow.  · You have wheezing or a persistent cough.  · Your symptoms do not improve with treatment.  · You have frequent heartburn for more than two weeks.  Get help right away if:  · You have pain in your arms, neck, jaw, teeth, or back.  · You feel sweaty, dizzy, or light-headed.  · You have chest pain or shortness of breath.  · You vomit and your vomit looks like blood or coffee grounds.  · Your stool is bloody or black.  This information is not intended to replace advice given to you by your health care provider. Make sure you discuss any questions you have with your health care provider.  Document Released: 05/06/2010 Document Revised: 05/25/2017 Document Reviewed: 04/13/2016  [employer] Patient Education © 2019 [employer]

## 2019-05-10 ENCOUNTER — TELEPHONE (OUTPATIENT)
Dept: GASTROENTEROLOGY | Facility: CLINIC | Age: 79
End: 2019-05-10

## 2019-05-10 NOTE — TELEPHONE ENCOUNTER
Returned patient phone call regarding his medications. Explained to him that he should be taking the dexilant 60 mg and he needs to stop the pantoprazole. Patient voiced understanding and also stated he was stopping the carafate.

## 2019-05-14 ENCOUNTER — CLINICAL SUPPORT (OUTPATIENT)
Dept: FAMILY MEDICINE CLINIC | Facility: CLINIC | Age: 79
End: 2019-05-14

## 2019-05-14 DIAGNOSIS — E34.9 TESTOSTERONE DEFICIENCY: ICD-10-CM

## 2019-05-14 PROCEDURE — 96372 THER/PROPH/DIAG INJ SC/IM: CPT | Performed by: FAMILY MEDICINE

## 2019-05-14 RX ADMIN — TESTOSTERONE CYPIONATE 200 MG: 200 INJECTION, SOLUTION INTRAMUSCULAR at 11:35

## 2019-05-15 RX ORDER — PRASUGREL 10 MG/1
10 TABLET, FILM COATED ORAL DAILY
Qty: 30 TABLET | Refills: 5 | Status: SHIPPED | OUTPATIENT
Start: 2019-05-15 | End: 2019-10-03 | Stop reason: SDUPTHER

## 2019-06-03 ENCOUNTER — CLINICAL SUPPORT (OUTPATIENT)
Dept: FAMILY MEDICINE CLINIC | Facility: CLINIC | Age: 79
End: 2019-06-03

## 2019-06-03 DIAGNOSIS — E34.9 TESTOSTERONE DEFICIENCY: ICD-10-CM

## 2019-06-03 PROCEDURE — 96372 THER/PROPH/DIAG INJ SC/IM: CPT | Performed by: FAMILY MEDICINE

## 2019-06-03 RX ADMIN — TESTOSTERONE CYPIONATE 200 MG: 200 INJECTION, SOLUTION INTRAMUSCULAR at 11:41

## 2019-06-17 ENCOUNTER — CLINICAL SUPPORT (OUTPATIENT)
Dept: FAMILY MEDICINE CLINIC | Facility: CLINIC | Age: 79
End: 2019-06-17

## 2019-06-17 DIAGNOSIS — R79.89 LOW TESTOSTERONE IN MALE: ICD-10-CM

## 2019-06-17 PROCEDURE — 96372 THER/PROPH/DIAG INJ SC/IM: CPT | Performed by: FAMILY MEDICINE

## 2019-06-17 RX ORDER — RANOLAZINE 1000 MG/1
TABLET, EXTENDED RELEASE ORAL
Qty: 60 TABLET | Refills: 5 | Status: SHIPPED | OUTPATIENT
Start: 2019-06-17 | End: 2019-12-10 | Stop reason: SDUPTHER

## 2019-06-17 RX ADMIN — TESTOSTERONE CYPIONATE 200 MG: 200 INJECTION, SOLUTION INTRAMUSCULAR at 13:57

## 2019-06-28 ENCOUNTER — CLINICAL SUPPORT (OUTPATIENT)
Dept: FAMILY MEDICINE CLINIC | Facility: CLINIC | Age: 79
End: 2019-06-28

## 2019-06-28 DIAGNOSIS — E34.9 TESTOSTERONE DEFICIENCY: ICD-10-CM

## 2019-06-28 PROCEDURE — 96372 THER/PROPH/DIAG INJ SC/IM: CPT | Performed by: FAMILY MEDICINE

## 2019-06-28 RX ADMIN — TESTOSTERONE CYPIONATE 200 MG: 200 INJECTION, SOLUTION INTRAMUSCULAR at 11:11

## 2019-07-11 DIAGNOSIS — E34.9 TESTOSTERONE DEFICIENCY: ICD-10-CM

## 2019-07-12 RX ORDER — TESTOSTERONE CYPIONATE 200 MG/ML
INJECTION, SOLUTION INTRAMUSCULAR
Qty: 2 ML | Refills: 2 | Status: SHIPPED | OUTPATIENT
Start: 2019-07-12 | End: 2019-10-14 | Stop reason: SDUPTHER

## 2019-07-15 ENCOUNTER — CLINICAL SUPPORT (OUTPATIENT)
Dept: FAMILY MEDICINE CLINIC | Facility: CLINIC | Age: 79
End: 2019-07-15

## 2019-07-15 DIAGNOSIS — R79.89 LOW TESTOSTERONE: ICD-10-CM

## 2019-07-15 PROCEDURE — 96372 THER/PROPH/DIAG INJ SC/IM: CPT | Performed by: FAMILY MEDICINE

## 2019-07-15 RX ADMIN — TESTOSTERONE CYPIONATE 200 MG: 200 INJECTION, SOLUTION INTRAMUSCULAR at 14:23

## 2019-07-29 ENCOUNTER — CLINICAL SUPPORT (OUTPATIENT)
Dept: FAMILY MEDICINE CLINIC | Facility: CLINIC | Age: 79
End: 2019-07-29

## 2019-07-29 DIAGNOSIS — R79.89 LOW TESTOSTERONE: ICD-10-CM

## 2019-07-29 PROCEDURE — 96372 THER/PROPH/DIAG INJ SC/IM: CPT | Performed by: FAMILY MEDICINE

## 2019-07-29 RX ADMIN — TESTOSTERONE CYPIONATE 200 MG: 200 INJECTION, SOLUTION INTRAMUSCULAR at 11:29

## 2019-08-05 ENCOUNTER — OFFICE VISIT (OUTPATIENT)
Dept: FAMILY MEDICINE CLINIC | Facility: CLINIC | Age: 79
End: 2019-08-05

## 2019-08-05 VITALS
OXYGEN SATURATION: 94 % | DIASTOLIC BLOOD PRESSURE: 67 MMHG | SYSTOLIC BLOOD PRESSURE: 143 MMHG | WEIGHT: 219 LBS | TEMPERATURE: 98.3 F | HEART RATE: 67 BPM | BODY MASS INDEX: 32.44 KG/M2 | HEIGHT: 69 IN

## 2019-08-05 DIAGNOSIS — J40 BRONCHITIS: Primary | ICD-10-CM

## 2019-08-05 PROBLEM — I25.708 CORONARY ARTERY DISEASE OF BYPASS GRAFT OF NATIVE HEART WITH STABLE ANGINA PECTORIS: Status: ACTIVE | Noted: 2019-04-11

## 2019-08-05 PROBLEM — I50.23 ACUTE ON CHRONIC SYSTOLIC CHF (CONGESTIVE HEART FAILURE), NYHA CLASS 2: Status: ACTIVE | Noted: 2019-05-22

## 2019-08-05 PROCEDURE — 99214 OFFICE O/P EST MOD 30 MIN: CPT | Performed by: FAMILY MEDICINE

## 2019-08-05 RX ORDER — CEFUROXIME AXETIL 500 MG/1
500 TABLET ORAL 2 TIMES DAILY
Qty: 20 TABLET | Refills: 0 | Status: SHIPPED | OUTPATIENT
Start: 2019-08-05 | End: 2020-06-18

## 2019-08-05 NOTE — PROGRESS NOTES
Subjective   Dale Mann is a 78 y.o. male who presents to the office for cough and congestion for about the last 5 days.  He was at a car show in [place] on Saturday and had some difficulty breathing after coughing very hard.  He was giving a breathing treatment with albuterol and this did seem to help.  He had recent pulmonary function testing but the results are not available in his chart.  He does have an inhaler and an albuterol nebulizer and uses these when needed.  The cough has been productive.  Along with that he has had nasal congestion rhinorrhea sore throat and ear fullness.  He would like to have something for the cough to help him rest at night.  c    The following portions of the patient's history were reviewed and updated as appropriate: allergies, current medications, past family history, past medical history, past social history, past surgical history and problem list.    Review of Systems   Constitutional: Negative for activity change, appetite change, fatigue and unexpected weight change.   HENT: Positive for congestion, ear pain, postnasal drip, rhinorrhea, sinus pressure and sinus pain. Negative for hearing loss, tinnitus, trouble swallowing and voice change.    Eyes: Negative.    Respiratory: Positive for cough, chest tightness, shortness of breath and wheezing.    Gastrointestinal: Negative for abdominal distention, blood in stool, constipation and diarrhea.   Endocrine: Negative.    Genitourinary: Negative for decreased urine volume, dysuria, frequency, hematuria and urgency.   Musculoskeletal: Negative.    Allergic/Immunologic: Negative.    Neurological: Negative for tremors, syncope and speech difficulty.   Hematological: Negative.    Psychiatric/Behavioral: Negative for dysphoric mood and sleep disturbance. The patient is not nervous/anxious.    All other systems reviewed and are negative.      Objective   Physical Exam   Constitutional: He is oriented to person, place, and  time. He appears well-developed and well-nourished. No distress.   HENT:   Head: Normocephalic and atraumatic.   Nose: Nose normal.   Mouth/Throat: Oropharynx is clear and moist.   Posterior pharynx is red with mild lymphoid hyperplasia.  Mild tenderness over maxillary sinuses.  Nasal mucosa red and swollen with purulent rhinorrhea.     Eyes: Conjunctivae and EOM are normal. Pupils are equal, round, and reactive to light.   Neck: Normal range of motion. Neck supple. No JVD present. No tracheal deviation present. No thyromegaly present.   Cardiovascular: Normal rate, regular rhythm, normal heart sounds and intact distal pulses.   No murmur heard.  Pulmonary/Chest: Effort normal and breath sounds normal. No respiratory distress. He has no wheezes. He has no rales. He exhibits no tenderness.   Abdominal: Soft. Bowel sounds are normal. He exhibits no distension and no mass. There is no tenderness.   Musculoskeletal: Normal range of motion. He exhibits no edema or tenderness.   Lymphadenopathy:     He has no cervical adenopathy.   Neurological: He is alert and oriented to person, place, and time. He exhibits normal muscle tone. Coordination normal.   Skin: Skin is warm and dry. No rash noted. No pallor.   Psychiatric: He has a normal mood and affect.   Nursing note and vitals reviewed.      Current Outpatient Medications:   •  aspirin 81 MG EC tablet, Take 81 mg by mouth Daily. Last dose 11/27/17, Disp: , Rfl:   •  dexlansoprazole (DEXILANT) 60 MG capsule, Take 1 capsule by mouth Daily for 180 days., Disp: 30 capsule, Rfl: 5  •  furosemide (LASIX) 40 MG tablet, Take 40 mg by mouth Daily., Disp: , Rfl:   •  isosorbide mononitrate (IMDUR) 60 MG 24 hr tablet, Take 60 mg by mouth Daily., Disp: , Rfl:   •  metoprolol tartrate (LOPRESSOR) 25 MG tablet, , Disp: , Rfl: 0  •  metoprolol tartrate (LOPRESSOR) 50 MG tablet, TAKE ONE TABLET BY MOUTH EVERY DAY REPLACES ATENOLOL, Disp: 30 tablet, Rfl: 5  •  nitroglycerin (NITROSTAT)  0.4 MG SL tablet, Place 1 tablet under the tongue Every 5 (Five) Minutes As Needed for Chest Pain. Take no more than 3 doses in 15 minutes., Disp: 20 tablet, Rfl: 5  •  polyethyl glycol-propyl glycol (SYSTANE) 0.4-0.3 % solution ophthalmic solution, Administer 2 drops to both eyes Every 1 (One) Hour As Needed., Disp: , Rfl:   •  potassium chloride (K-DUR,KLOR-CON) 20 MEQ CR tablet, TAKE 1 TABLET BY MOUTH DAILY., Disp: 60 tablet, Rfl: 5  •  prasugrel (EFFIENT) 10 MG tablet, Take 1 tablet by mouth Daily., Disp: 30 tablet, Rfl: 5  •  psyllium (METAMUCIL SMOOTH TEXTURE) 28 % packet, Take 1 packet by mouth Daily., Disp: 30 each, Rfl: 11  •  ranolazine (RANEXA) 1000 MG 12 hr tablet, TAKE ONE TABLET BY MOUTH TWO TIMES A DAY, Disp: 60 tablet, Rfl: 5  •  simvastatin (ZOCOR) 80 MG tablet, 1 TABLET(S) BY MOUTH AT BEDTIME, Disp: 90 tablet, Rfl: 2  •  sucralfate (CARAFATE) 1 g tablet, Take 1 tablet by mouth 4 (Four) Times a Day., Disp: 120 tablet, Rfl: 5  •  tamsulosin (FLOMAX) 0.4 MG capsule 24 hr capsule, , Disp: , Rfl: 11  •  Testosterone Cypionate (DEPOTESTOTERONE CYPIONATE) 200 MG/ML injection, INJECT 1 ML INTO THE APPROPRIATE MUSCLE AS DIRECTED BY PRESCRIBER EVERY 14 (FOURTEEN) DAYS., Disp: 2 mL, Rfl: 2    Current Facility-Administered Medications:   •  Testosterone Cypionate (DEPOTESTOTERONE CYPIONATE) injection 200 mg, 200 mg, Intramuscular, Q14 Days, Dunia Cummings MD, 200 mg at 07/29/19 1129      Assessment/Plan   Dale was seen today for cough and nasal congestion.    Diagnoses and all orders for this visit:    Bronchitis    Other orders  -     cefuroxime (CEFTIN) 500 MG tablet; Take 1 tablet by mouth 2 (Two) Times a Day.  -     HYDROcod Polst-CPM Polst ER (TUSSIONEX PENNKINETIC ER) 10-8 MG/5ML ER suspension; Take 5 mL by mouth Every 12 (Twelve) Hours As Needed for Cough.    Will give Ceftin and a short-term course of Tussionex suspension to help him rest at night as he is a heart patient and its difficult for him  to find anything over-the-counter that he can tolerate.  See me if not improving within 3 to 5 days or for worsening symptoms.          This document has been electronically signed by Dunia Cummings MD on August 5, 2019 11:31 AM

## 2019-08-15 ENCOUNTER — CLINICAL SUPPORT (OUTPATIENT)
Dept: FAMILY MEDICINE CLINIC | Facility: CLINIC | Age: 79
End: 2019-08-15

## 2019-08-15 DIAGNOSIS — E34.9 TESTOSTERONE DEFICIENCY: ICD-10-CM

## 2019-08-15 PROCEDURE — 96372 THER/PROPH/DIAG INJ SC/IM: CPT | Performed by: FAMILY MEDICINE

## 2019-08-15 RX ADMIN — TESTOSTERONE CYPIONATE 200 MG: 200 INJECTION, SOLUTION INTRAMUSCULAR at 11:20

## 2019-08-28 ENCOUNTER — CLINICAL SUPPORT (OUTPATIENT)
Dept: FAMILY MEDICINE CLINIC | Facility: CLINIC | Age: 79
End: 2019-08-28

## 2019-08-28 DIAGNOSIS — E34.9 TESTOSTERONE DEFICIENCY: ICD-10-CM

## 2019-08-28 PROCEDURE — 96372 THER/PROPH/DIAG INJ SC/IM: CPT | Performed by: FAMILY MEDICINE

## 2019-08-28 RX ADMIN — TESTOSTERONE CYPIONATE 200 MG: 200 INJECTION, SOLUTION INTRAMUSCULAR at 11:30

## 2019-09-16 ENCOUNTER — CLINICAL SUPPORT (OUTPATIENT)
Dept: FAMILY MEDICINE CLINIC | Facility: CLINIC | Age: 79
End: 2019-09-16

## 2019-09-16 DIAGNOSIS — E34.9 TESTOSTERONE DEFICIENCY: ICD-10-CM

## 2019-09-16 PROCEDURE — 96372 THER/PROPH/DIAG INJ SC/IM: CPT | Performed by: FAMILY MEDICINE

## 2019-09-16 RX ADMIN — TESTOSTERONE CYPIONATE 200 MG: 200 INJECTION, SOLUTION INTRAMUSCULAR at 13:21

## 2019-10-03 RX ORDER — PRASUGREL 10 MG/1
10 TABLET, FILM COATED ORAL DAILY
Qty: 30 TABLET | Refills: 5 | Status: SHIPPED | OUTPATIENT
Start: 2019-10-03

## 2019-10-14 ENCOUNTER — CLINICAL SUPPORT (OUTPATIENT)
Dept: FAMILY MEDICINE CLINIC | Facility: CLINIC | Age: 79
End: 2019-10-14

## 2019-10-14 DIAGNOSIS — E34.9 TESTOSTERONE DEFICIENCY: ICD-10-CM

## 2019-10-14 PROCEDURE — 96372 THER/PROPH/DIAG INJ SC/IM: CPT | Performed by: FAMILY MEDICINE

## 2019-10-14 RX ORDER — TESTOSTERONE CYPIONATE 200 MG/ML
INJECTION, SOLUTION INTRAMUSCULAR
Qty: 2 ML | Refills: 2 | Status: SHIPPED | OUTPATIENT
Start: 2019-10-14 | End: 2020-01-29 | Stop reason: SDUPTHER

## 2019-10-14 RX ADMIN — TESTOSTERONE CYPIONATE 200 MG: 200 INJECTION, SOLUTION INTRAMUSCULAR at 14:18

## 2019-10-18 RX ORDER — ISOSORBIDE MONONITRATE 60 MG/1
30 TABLET, EXTENDED RELEASE ORAL DAILY
Qty: 30 TABLET | Refills: 5 | Status: SHIPPED | OUTPATIENT
Start: 2019-10-18 | End: 2020-03-11 | Stop reason: SDUPTHER

## 2019-10-29 ENCOUNTER — CLINICAL SUPPORT (OUTPATIENT)
Dept: FAMILY MEDICINE CLINIC | Facility: CLINIC | Age: 79
End: 2019-10-29

## 2019-10-29 DIAGNOSIS — E34.9 TESTOSTERONE DEFICIENCY: ICD-10-CM

## 2019-10-29 PROCEDURE — 96372 THER/PROPH/DIAG INJ SC/IM: CPT | Performed by: FAMILY MEDICINE

## 2019-10-29 RX ADMIN — TESTOSTERONE CYPIONATE 200 MG: 200 INJECTION, SOLUTION INTRAMUSCULAR at 11:38

## 2019-11-18 ENCOUNTER — CLINICAL SUPPORT (OUTPATIENT)
Dept: FAMILY MEDICINE CLINIC | Facility: CLINIC | Age: 79
End: 2019-11-18

## 2019-11-18 ENCOUNTER — OFFICE VISIT (OUTPATIENT)
Dept: GASTROENTEROLOGY | Facility: CLINIC | Age: 79
End: 2019-11-18

## 2019-11-18 VITALS
HEIGHT: 69 IN | DIASTOLIC BLOOD PRESSURE: 56 MMHG | HEART RATE: 68 BPM | BODY MASS INDEX: 30.96 KG/M2 | WEIGHT: 209 LBS | SYSTOLIC BLOOD PRESSURE: 128 MMHG

## 2019-11-18 DIAGNOSIS — E34.9 TESTOSTERONE DEFICIENCY: ICD-10-CM

## 2019-11-18 DIAGNOSIS — K21.00 GASTROESOPHAGEAL REFLUX DISEASE WITH ESOPHAGITIS: Primary | ICD-10-CM

## 2019-11-18 PROCEDURE — 96372 THER/PROPH/DIAG INJ SC/IM: CPT | Performed by: INTERNAL MEDICINE

## 2019-11-18 PROCEDURE — 99213 OFFICE O/P EST LOW 20 MIN: CPT | Performed by: NURSE PRACTITIONER

## 2019-11-18 RX ORDER — DEXLANSOPRAZOLE 60 MG/1
60 CAPSULE, DELAYED RELEASE ORAL DAILY
Qty: 90 CAPSULE | Refills: 1 | Status: SHIPPED | OUTPATIENT
Start: 2019-11-18 | End: 2020-05-16

## 2019-11-18 RX ADMIN — TESTOSTERONE CYPIONATE 200 MG: 200 INJECTION, SOLUTION INTRAMUSCULAR at 11:45

## 2019-11-18 NOTE — PATIENT INSTRUCTIONS
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
## 2019-12-02 ENCOUNTER — CLINICAL SUPPORT (OUTPATIENT)
Dept: FAMILY MEDICINE CLINIC | Facility: CLINIC | Age: 79
End: 2019-12-02

## 2019-12-02 DIAGNOSIS — E34.9 TESTOSTERONE DEFICIENCY: ICD-10-CM

## 2019-12-02 PROCEDURE — 96372 THER/PROPH/DIAG INJ SC/IM: CPT | Performed by: INTERNAL MEDICINE

## 2019-12-02 RX ADMIN — TESTOSTERONE CYPIONATE 200 MG: 200 INJECTION, SOLUTION INTRAMUSCULAR at 13:48

## 2019-12-10 RX ORDER — RANOLAZINE 1000 MG/1
TABLET, EXTENDED RELEASE ORAL
Qty: 60 TABLET | Refills: 5 | Status: SHIPPED | OUTPATIENT
Start: 2019-12-10 | End: 2020-05-08

## 2019-12-10 RX ORDER — POTASSIUM CHLORIDE 20 MEQ/1
TABLET, EXTENDED RELEASE ORAL
Qty: 60 TABLET | Refills: 5 | Status: SHIPPED | OUTPATIENT
Start: 2019-12-10 | End: 2019-12-30 | Stop reason: SDUPTHER

## 2019-12-30 ENCOUNTER — CLINICAL SUPPORT (OUTPATIENT)
Dept: FAMILY MEDICINE CLINIC | Facility: CLINIC | Age: 79
End: 2019-12-30

## 2019-12-30 DIAGNOSIS — E34.9 TESTOSTERONE DEFICIENCY: ICD-10-CM

## 2019-12-30 PROCEDURE — 96372 THER/PROPH/DIAG INJ SC/IM: CPT | Performed by: FAMILY MEDICINE

## 2019-12-30 RX ORDER — POTASSIUM CHLORIDE 20 MEQ/1
20 TABLET, EXTENDED RELEASE ORAL DAILY
Qty: 60 TABLET | Refills: 5 | Status: SHIPPED | OUTPATIENT
Start: 2019-12-30 | End: 2020-12-29

## 2019-12-30 RX ADMIN — TESTOSTERONE CYPIONATE 200 MG: 200 INJECTION, SOLUTION INTRAMUSCULAR at 14:56

## 2020-01-15 ENCOUNTER — CLINICAL SUPPORT (OUTPATIENT)
Dept: FAMILY MEDICINE CLINIC | Facility: CLINIC | Age: 80
End: 2020-01-15

## 2020-01-15 DIAGNOSIS — E34.9 TESTOSTERONE DEFICIENCY: ICD-10-CM

## 2020-01-15 PROCEDURE — 96372 THER/PROPH/DIAG INJ SC/IM: CPT | Performed by: FAMILY MEDICINE

## 2020-01-15 RX ADMIN — TESTOSTERONE CYPIONATE 200 MG: 200 INJECTION, SOLUTION INTRAMUSCULAR at 11:45

## 2020-01-29 DIAGNOSIS — E34.9 TESTOSTERONE DEFICIENCY: ICD-10-CM

## 2020-01-29 RX ORDER — TESTOSTERONE CYPIONATE 200 MG/ML
200 INJECTION, SOLUTION INTRAMUSCULAR
Qty: 2 ML | Refills: 2 | Status: SHIPPED | OUTPATIENT
Start: 2020-01-29 | End: 2020-06-18 | Stop reason: SDUPTHER

## 2020-02-14 ENCOUNTER — CLINICAL SUPPORT (OUTPATIENT)
Dept: FAMILY MEDICINE CLINIC | Facility: CLINIC | Age: 80
End: 2020-02-14

## 2020-02-14 DIAGNOSIS — E34.9 TESTOSTERONE DEFICIENCY: ICD-10-CM

## 2020-02-14 PROCEDURE — 96372 THER/PROPH/DIAG INJ SC/IM: CPT | Performed by: FAMILY MEDICINE

## 2020-02-14 RX ADMIN — TESTOSTERONE CYPIONATE 200 MG: 200 INJECTION, SOLUTION INTRAMUSCULAR at 11:40

## 2020-03-02 ENCOUNTER — CLINICAL SUPPORT (OUTPATIENT)
Dept: FAMILY MEDICINE CLINIC | Facility: CLINIC | Age: 80
End: 2020-03-02

## 2020-03-02 DIAGNOSIS — E34.9 TESTOSTERONE DEFICIENCY: ICD-10-CM

## 2020-03-02 PROCEDURE — 96372 THER/PROPH/DIAG INJ SC/IM: CPT | Performed by: FAMILY MEDICINE

## 2020-03-02 RX ADMIN — TESTOSTERONE CYPIONATE 200 MG: 200 INJECTION, SOLUTION INTRAMUSCULAR at 11:37

## 2020-03-11 RX ORDER — ISOSORBIDE MONONITRATE 60 MG/1
60 TABLET, EXTENDED RELEASE ORAL DAILY
Qty: 30 TABLET | Refills: 5 | Status: SHIPPED | OUTPATIENT
Start: 2020-03-11 | End: 2020-06-12

## 2020-03-16 ENCOUNTER — CLINICAL SUPPORT (OUTPATIENT)
Dept: FAMILY MEDICINE CLINIC | Facility: CLINIC | Age: 80
End: 2020-03-16

## 2020-03-16 DIAGNOSIS — E34.9 TESTOSTERONE DEFICIENCY: ICD-10-CM

## 2020-03-16 PROCEDURE — 96372 THER/PROPH/DIAG INJ SC/IM: CPT | Performed by: FAMILY MEDICINE

## 2020-03-17 RX ADMIN — TESTOSTERONE CYPIONATE 200 MG: 200 INJECTION, SOLUTION INTRAMUSCULAR at 10:46

## 2020-05-08 RX ORDER — RANOLAZINE 1000 MG/1
TABLET, EXTENDED RELEASE ORAL
Qty: 60 TABLET | Refills: 5 | Status: SHIPPED | OUTPATIENT
Start: 2020-05-08 | End: 2020-11-17

## 2020-05-18 ENCOUNTER — CLINICAL SUPPORT (OUTPATIENT)
Dept: FAMILY MEDICINE CLINIC | Facility: CLINIC | Age: 80
End: 2020-05-18

## 2020-05-18 ENCOUNTER — OFFICE VISIT (OUTPATIENT)
Dept: GASTROENTEROLOGY | Facility: CLINIC | Age: 80
End: 2020-05-18

## 2020-05-18 VITALS
SYSTOLIC BLOOD PRESSURE: 106 MMHG | BODY MASS INDEX: 28.73 KG/M2 | DIASTOLIC BLOOD PRESSURE: 62 MMHG | HEIGHT: 69 IN | WEIGHT: 194 LBS | HEART RATE: 67 BPM

## 2020-05-18 DIAGNOSIS — E34.9 TESTOSTERONE DEFICIENCY: ICD-10-CM

## 2020-05-18 DIAGNOSIS — K21.00 GASTROESOPHAGEAL REFLUX DISEASE WITH ESOPHAGITIS: Primary | ICD-10-CM

## 2020-05-18 PROBLEM — Z87.898 HISTORY OF SYNCOPE: Status: ACTIVE | Noted: 2018-06-08

## 2020-05-18 PROBLEM — N17.9 AKI (ACUTE KIDNEY INJURY) (HCC): Status: ACTIVE | Noted: 2020-03-17

## 2020-05-18 PROBLEM — E78.5 HYPERLIPIDEMIA: Status: ACTIVE | Noted: 2017-01-04

## 2020-05-18 PROBLEM — R00.1 BRADYCARDIA: Status: ACTIVE | Noted: 2020-03-17

## 2020-05-18 PROBLEM — Z95.1 HISTORY OF CORONARY ARTERY BYPASS GRAFT: Status: ACTIVE | Noted: 2020-03-17

## 2020-05-18 PROBLEM — I20.0 UNSTABLE ANGINA (HCC): Status: ACTIVE | Noted: 2017-01-04

## 2020-05-18 PROCEDURE — 96372 THER/PROPH/DIAG INJ SC/IM: CPT | Performed by: FAMILY MEDICINE

## 2020-05-18 PROCEDURE — 99213 OFFICE O/P EST LOW 20 MIN: CPT | Performed by: NURSE PRACTITIONER

## 2020-05-18 RX ORDER — DEXLANSOPRAZOLE 60 MG/1
60 CAPSULE, DELAYED RELEASE ORAL DAILY
COMMUNITY
End: 2020-05-18 | Stop reason: SDUPTHER

## 2020-05-18 RX ORDER — SIMETHICONE 80 MG
80 TABLET,CHEWABLE ORAL EVERY 6 HOURS PRN
Qty: 120 TABLET | Refills: 5 | Status: SHIPPED | OUTPATIENT
Start: 2020-05-18 | End: 2020-11-18 | Stop reason: SDUPTHER

## 2020-05-18 RX ORDER — DEXLANSOPRAZOLE 60 MG/1
60 CAPSULE, DELAYED RELEASE ORAL DAILY
Qty: 90 CAPSULE | Refills: 1 | Status: SHIPPED | OUTPATIENT
Start: 2020-05-18 | End: 2020-11-18 | Stop reason: SDUPTHER

## 2020-05-18 RX ADMIN — TESTOSTERONE CYPIONATE 200 MG: 200 INJECTION, SOLUTION INTRAMUSCULAR at 16:46
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
## 2020-06-12 RX ORDER — ISOSORBIDE MONONITRATE 60 MG/1
60 TABLET, EXTENDED RELEASE ORAL DAILY
Qty: 30 TABLET | Refills: 5 | Status: SHIPPED | OUTPATIENT
Start: 2020-06-12 | End: 2021-03-03

## 2020-06-15 ENCOUNTER — CLINICAL SUPPORT (OUTPATIENT)
Dept: FAMILY MEDICINE CLINIC | Facility: CLINIC | Age: 80
End: 2020-06-15

## 2020-06-15 DIAGNOSIS — E34.9 TESTOSTERONE DEFICIENCY: ICD-10-CM

## 2020-06-15 PROCEDURE — 96372 THER/PROPH/DIAG INJ SC/IM: CPT | Performed by: FAMILY MEDICINE

## 2020-06-15 RX ADMIN — TESTOSTERONE CYPIONATE 200 MG: 200 INJECTION, SOLUTION INTRAMUSCULAR at 10:38

## 2020-06-16 DIAGNOSIS — E34.9 TESTOSTERONE DEFICIENCY: ICD-10-CM

## 2020-06-16 RX ORDER — TESTOSTERONE CYPIONATE 200 MG/ML
200 INJECTION, SOLUTION INTRAMUSCULAR
Qty: 2 ML | Refills: 2 | Status: CANCELLED | OUTPATIENT
Start: 2020-06-16

## 2020-06-18 ENCOUNTER — OFFICE VISIT (OUTPATIENT)
Dept: FAMILY MEDICINE CLINIC | Facility: CLINIC | Age: 80
End: 2020-06-18

## 2020-06-18 DIAGNOSIS — E34.9 TESTOSTERONE DEFICIENCY: ICD-10-CM

## 2020-06-18 DIAGNOSIS — I25.708 CORONARY ARTERY DISEASE OF BYPASS GRAFT OF NATIVE HEART WITH STABLE ANGINA PECTORIS (HCC): Primary | ICD-10-CM

## 2020-06-18 DIAGNOSIS — I50.23 ACUTE ON CHRONIC SYSTOLIC CHF (CONGESTIVE HEART FAILURE), NYHA CLASS 2 (HCC): ICD-10-CM

## 2020-06-18 DIAGNOSIS — I10 ESSENTIAL HYPERTENSION: ICD-10-CM

## 2020-06-18 PROBLEM — I20.0 UNSTABLE ANGINA: Status: RESOLVED | Noted: 2017-01-04 | Resolved: 2020-06-18

## 2020-06-18 PROBLEM — Z87.898 HISTORY OF SYNCOPE: Status: RESOLVED | Noted: 2018-06-08 | Resolved: 2020-06-18

## 2020-06-18 PROCEDURE — 99442 PR PHYS/QHP TELEPHONE EVALUATION 11-20 MIN: CPT | Performed by: FAMILY MEDICINE

## 2020-06-18 RX ORDER — TESTOSTERONE CYPIONATE 200 MG/ML
200 INJECTION, SOLUTION INTRAMUSCULAR
Qty: 2 ML | Refills: 2 | Status: SHIPPED | OUTPATIENT
Start: 2020-06-18 | End: 2020-09-21 | Stop reason: SDUPTHER

## 2020-06-18 NOTE — PROGRESS NOTES
Subjective   Dlae Mann is a 79 y.o. male.   You have chosen to receive care through a telephone visit. Do you consent to use a telephone visit for your medical care today? Yes  This visit has been rescheduled as a phone visit to comply with patient safety concerns in accordance with CDC recommendations. Total time of discussion was 15 minutes.  Patient with coronary disease, hyperlipidemia, CHF, seen by telephone visit today needs refills of medications.  Overall feels he is doing well.  Has lab work scheduled with his cardiologist in the next week or 2.    He also takes testosterone and feels it does help with mood and energy and needs refills of this.    History of Present Illness    The following portions of the patient's history were reviewed and updated as appropriate: allergies, current medications, past family history, past medical history, past social history, past surgical history and problem list.    Review of Systems   Constitutional: Negative for activity change, appetite change, diaphoresis, fatigue, fever and unexpected weight change.   HENT: Negative for congestion, ear pain, hearing loss, sinus pressure, sore throat, tinnitus, trouble swallowing and voice change.    Eyes: Negative.    Gastrointestinal: Negative for abdominal distention, abdominal pain, blood in stool, constipation, diarrhea and vomiting.   Endocrine: Negative.    Genitourinary: Negative for decreased urine volume, dysuria, frequency, hematuria and urgency.   Musculoskeletal: Negative.    Skin: Positive for rash.   Allergic/Immunologic: Negative.    Neurological: Positive for weakness. Negative for dizziness, tremors, seizures, syncope, speech difficulty, numbness and headaches.   Hematological: Negative.    Psychiatric/Behavioral: Negative for dysphoric mood and sleep disturbance. The patient is not nervous/anxious.    All other systems reviewed and are negative.      Objective   Physical Exam    Assessment/Plan    Diagnoses and all orders for this visit:    Coronary artery disease of bypass graft of native heart with stable angina pectoris (CMS/HCC)  -     Comprehensive Metabolic Panel  -     CBC & Differential; Future  -     Lipid Panel; Future    Testosterone deficiency  -     Testosterone Cypionate (DEPOTESTOTERONE CYPIONATE) 200 MG/ML injection; Inject 1 mL into the appropriate muscle as directed by prescriber Every 14 (Fourteen) Days.    Essential hypertension    Acute on chronic systolic CHF (congestive heart failure), NYHA class 2 (CMS/formerly [facility])    The patient has read and signed the [facility] Controlled Substance Contract.  I will continue to see patient for regular follow up appointments. Patient is well controlled on the medication.  SHRAVAN has been reviewed by me and is updated every 3 months. The patient is aware of the potential for addiction and dependence.   Continue testosterone injections twice monthly    Continue with current medications for coronary disease hypertension hyperlipidemia and CHF.  He is scheduled to follow-up with cardiology and he will return fasting for labs        This document has been electronically signed by Dunia Cummings MD on June 18, 2020 12:47

## 2020-06-30 ENCOUNTER — CLINICAL SUPPORT (OUTPATIENT)
Dept: FAMILY MEDICINE CLINIC | Facility: CLINIC | Age: 80
End: 2020-06-30

## 2020-06-30 DIAGNOSIS — E34.9 TESTOSTERONE DEFICIENCY: ICD-10-CM

## 2020-06-30 PROCEDURE — 96372 THER/PROPH/DIAG INJ SC/IM: CPT | Performed by: FAMILY MEDICINE

## 2020-06-30 RX ADMIN — TESTOSTERONE CYPIONATE 200 MG: 200 INJECTION, SOLUTION INTRAMUSCULAR at 13:52

## 2020-07-15 ENCOUNTER — CLINICAL SUPPORT (OUTPATIENT)
Dept: FAMILY MEDICINE CLINIC | Facility: CLINIC | Age: 80
End: 2020-07-15

## 2020-07-15 ENCOUNTER — LAB (OUTPATIENT)
Dept: LAB | Facility: OTHER | Age: 80
End: 2020-07-15

## 2020-07-15 DIAGNOSIS — I25.708 CORONARY ARTERY DISEASE OF BYPASS GRAFT OF NATIVE HEART WITH STABLE ANGINA PECTORIS (HCC): ICD-10-CM

## 2020-07-15 DIAGNOSIS — E34.9 TESTOSTERONE DEFICIENCY: ICD-10-CM

## 2020-07-15 LAB
ALBUMIN SERPL-MCNC: 3.6 G/DL (ref 3.5–5)
ALBUMIN/GLOB SERPL: 1.1 G/DL (ref 1.1–1.8)
ALP SERPL-CCNC: 101 U/L (ref 38–126)
ALT SERPL W P-5'-P-CCNC: 9 U/L
ANION GAP SERPL CALCULATED.3IONS-SCNC: 5 MMOL/L (ref 5–15)
AST SERPL-CCNC: 18 U/L (ref 17–59)
BASOPHILS # BLD AUTO: 0.02 10*3/MM3 (ref 0–0.2)
BASOPHILS NFR BLD AUTO: 0.5 % (ref 0–1.5)
BILIRUB SERPL-MCNC: 0.9 MG/DL (ref 0.2–1.3)
BUN SERPL-MCNC: 25 MG/DL (ref 7–23)
BUN/CREAT SERPL: 23.1 (ref 7–25)
CALCIUM SPEC-SCNC: 9.2 MG/DL (ref 8.4–10.2)
CHLORIDE SERPL-SCNC: 103 MMOL/L (ref 101–112)
CHOLEST SERPL-MCNC: 117 MG/DL (ref 150–200)
CO2 SERPL-SCNC: 30 MMOL/L (ref 22–30)
CREAT SERPL-MCNC: 1.08 MG/DL (ref 0.7–1.3)
DEPRECATED RDW RBC AUTO: 54.9 FL (ref 37–54)
EOSINOPHIL # BLD AUTO: 0.21 10*3/MM3 (ref 0–0.4)
EOSINOPHIL NFR BLD AUTO: 5.2 % (ref 0.3–6.2)
ERYTHROCYTE [DISTWIDTH] IN BLOOD BY AUTOMATED COUNT: 15.4 % (ref 12.3–15.4)
GFR SERPL CREATININE-BSD FRML MDRD: 66 ML/MIN/1.73 (ref 42–98)
GLOBULIN UR ELPH-MCNC: 3.4 GM/DL (ref 2.3–3.5)
GLUCOSE SERPL-MCNC: 102 MG/DL (ref 70–99)
HCT VFR BLD AUTO: 39.9 % (ref 37.5–51)
HDLC SERPL-MCNC: 41 MG/DL (ref 40–59)
HGB BLD-MCNC: 13.2 G/DL (ref 13–17.7)
LDLC SERPL CALC-MCNC: 50 MG/DL
LDLC/HDLC SERPL: 1.21 {RATIO} (ref 0–3.55)
LYMPHOCYTES # BLD AUTO: 1.24 10*3/MM3 (ref 0.7–3.1)
LYMPHOCYTES NFR BLD AUTO: 30.7 % (ref 19.6–45.3)
MCH RBC QN AUTO: 33.1 PG (ref 26.6–33)
MCHC RBC AUTO-ENTMCNC: 33.1 G/DL (ref 31.5–35.7)
MCV RBC AUTO: 100 FL (ref 79–97)
MONOCYTES # BLD AUTO: 0.38 10*3/MM3 (ref 0.1–0.9)
MONOCYTES NFR BLD AUTO: 9.4 % (ref 5–12)
NEUTROPHILS NFR BLD AUTO: 2.19 10*3/MM3 (ref 1.7–7)
NEUTROPHILS NFR BLD AUTO: 54.2 % (ref 42.7–76)
PLATELET # BLD AUTO: 151 10*3/MM3 (ref 140–450)
PMV BLD AUTO: 8.4 FL (ref 6–12)
POTASSIUM SERPL-SCNC: 4.2 MMOL/L (ref 3.4–5)
PROT SERPL-MCNC: 7 G/DL (ref 6.3–8.6)
RBC # BLD AUTO: 3.99 10*6/MM3 (ref 4.14–5.8)
SODIUM SERPL-SCNC: 138 MMOL/L (ref 137–145)
TRIGL SERPL-MCNC: 131 MG/DL
VLDLC SERPL-MCNC: 26.2 MG/DL
WBC # BLD AUTO: 4.04 10*3/MM3 (ref 3.4–10.8)

## 2020-07-15 PROCEDURE — 80061 LIPID PANEL: CPT | Performed by: FAMILY MEDICINE

## 2020-07-15 PROCEDURE — 85025 COMPLETE CBC W/AUTO DIFF WBC: CPT | Performed by: FAMILY MEDICINE

## 2020-07-15 PROCEDURE — 80053 COMPREHEN METABOLIC PANEL: CPT | Performed by: FAMILY MEDICINE

## 2020-07-15 PROCEDURE — 36415 COLL VENOUS BLD VENIPUNCTURE: CPT | Performed by: FAMILY MEDICINE

## 2020-07-15 PROCEDURE — 96372 THER/PROPH/DIAG INJ SC/IM: CPT | Performed by: FAMILY MEDICINE

## 2020-07-15 RX ADMIN — TESTOSTERONE CYPIONATE 200 MG: 200 INJECTION, SOLUTION INTRAMUSCULAR at 09:52

## 2020-07-30 ENCOUNTER — CLINICAL SUPPORT (OUTPATIENT)
Dept: FAMILY MEDICINE CLINIC | Facility: CLINIC | Age: 80
End: 2020-07-30

## 2020-07-30 DIAGNOSIS — E34.9 TESTOSTERONE DEFICIENCY: ICD-10-CM

## 2020-07-30 PROCEDURE — 96372 THER/PROPH/DIAG INJ SC/IM: CPT | Performed by: FAMILY MEDICINE

## 2020-07-30 RX ADMIN — TESTOSTERONE CYPIONATE 200 MG: 200 INJECTION, SOLUTION INTRAMUSCULAR at 10:21

## 2020-08-29 ENCOUNTER — TELEPHONE (OUTPATIENT)
Dept: FAMILY MEDICINE CLINIC | Facility: CLINIC | Age: 80
End: 2020-08-29

## 2020-08-31 ENCOUNTER — CLINICAL SUPPORT (OUTPATIENT)
Dept: FAMILY MEDICINE CLINIC | Facility: CLINIC | Age: 80
End: 2020-08-31

## 2020-08-31 DIAGNOSIS — E34.9 TESTOSTERONE DEFICIENCY: ICD-10-CM

## 2020-08-31 PROCEDURE — 96372 THER/PROPH/DIAG INJ SC/IM: CPT | Performed by: FAMILY MEDICINE

## 2020-08-31 RX ADMIN — TESTOSTERONE CYPIONATE 200 MG: 200 INJECTION, SOLUTION INTRAMUSCULAR at 12:39

## 2020-09-14 ENCOUNTER — OFFICE VISIT (OUTPATIENT)
Dept: FAMILY MEDICINE CLINIC | Facility: CLINIC | Age: 80
End: 2020-09-14

## 2020-09-14 VITALS — HEIGHT: 69 IN | WEIGHT: 194 LBS | BODY MASS INDEX: 28.73 KG/M2

## 2020-09-14 DIAGNOSIS — Z00.00 MEDICARE ANNUAL WELLNESS VISIT, SUBSEQUENT: Primary | ICD-10-CM

## 2020-09-14 PROCEDURE — G0439 PPPS, SUBSEQ VISIT: HCPCS | Performed by: FAMILY MEDICINE

## 2020-09-14 PROCEDURE — 96160 PT-FOCUSED HLTH RISK ASSMT: CPT | Performed by: FAMILY MEDICINE

## 2020-09-14 NOTE — PROGRESS NOTES
The ABCs of the Annual Wellness Visit  Subsequent Medicare Wellness Visit    Chief Complaint   Patient presents with   • Medicare Wellness-Initial Visit     This visit has been rescheduled as a phone visit to comply with patient safety concerns in accordance with CDC recommendations.    You have chosen to receive care through a telephone visit. Do you consent to use a telephone visit for your medical care today? Yes    Subjective   History of Present Illness:  Dale Mann is a 79 y.o. male who presents for a Subsequent Medicare Wellness Visit.    HEALTH RISK ASSESSMENT    Recent Hospitalizations:  No hospitalization(s) within the last year.    Current Medical Providers:  Patient Care Team:  Dunia Cummings MD as PCP - General (Family Medicine)  Toña Wesley APRN (Gastroenterology)  Dunia Cummings MD as Consulting Physician (Family Medicine)    Smoking Status:  Social History     Tobacco Use   Smoking Status Former Smoker   • Quit date:    • Years since quittin.7   Smokeless Tobacco Never Used       Alcohol Consumption:  Social History     Substance and Sexual Activity   Alcohol Use Yes    Comment: socially       Depression Screen:   PHQ-2/PHQ-9 Depression Screening 2020   Little interest or pleasure in doing things 0   Feeling down, depressed, or hopeless 0   Total Score 0       Fall Risk Screen:  STEADI Fall Risk Assessment has not been completed.    Health Habits and Functional and Cognitive Screening:  Functional & Cognitive Status 2020   Do you have difficulty preparing food and eating? No   Do you have difficulty bathing yourself, getting dressed or grooming yourself? No   Do you have difficulty using the toilet? No   Do you have difficulty moving around from place to place? Yes   Do you have trouble with steps or getting out of a bed or a chair? No   Do you need help using the phone?  No   Are you deaf or do you have serious difficulty hearing?  No   Do you need help with  transportation? No   Do you need help shopping? No   Do you need help preparing meals?  No   Do you need help with housework?  No   Do you need help with laundry? No   Do you need help taking your medications? No   Do you need help managing money? No   Do you ever drive or ride in a car without wearing a seat belt? No         Does the patient have evidence of cognitive impairment? No    Asprin use counseling:Taking ASA appropriately as indicated    Age-appropriate Screening Schedule:  Refer to the list below for future screening recommendations based on patient's age, sex and/or medical conditions. Orders for these recommended tests are listed in the plan section. The patient has been provided with a written plan.    Health Maintenance   Topic Date Due   • INFLUENZA VACCINE  08/01/2020   • ZOSTER VACCINE (2 of 3) 11/20/2023 (Originally 10/19/2015)   • COLONOSCOPY  05/16/2021   • LIPID PANEL  07/15/2021   • TDAP/TD VACCINES (2 - Td) 04/18/2027          The following portions of the patient's history were reviewed and updated as appropriate: allergies, current medications, past family history, past medical history, past social history, past surgical history and problem list.    Outpatient Medications Prior to Visit   Medication Sig Dispense Refill   • aspirin 81 MG EC tablet Take 81 mg by mouth Daily. Last dose 11/27/17     • dexlansoprazole (Dexilant) 60 MG capsule Take 1 capsule by mouth Daily. 90 capsule 1   • furosemide (LASIX) 40 MG tablet Take 40 mg by mouth Daily.     • HYDROcod Polst-CPM Polst ER (TUSSIONEX PENNKINETIC ER) 10-8 MG/5ML ER suspension Take 5 mL by mouth Every 12 (Twelve) Hours As Needed for Cough. 115 mL 0   • isosorbide mononitrate (IMDUR) 60 MG 24 hr tablet TAKE 1 TABLET BY MOUTH DAILY. 30 tablet 5   • metoprolol tartrate (LOPRESSOR) 50 MG tablet TAKE ONE TABLET BY MOUTH EVERY DAY REPLACES ATENOLOL 30 tablet 5   • nitroglycerin (NITROSTAT) 0.4 MG SL tablet Place 1 tablet under the tongue Every  5 (Five) Minutes As Needed for Chest Pain. Take no more than 3 doses in 15 minutes. 20 tablet 5   • polyethyl glycol-propyl glycol (SYSTANE) 0.4-0.3 % solution ophthalmic solution Administer 2 drops to both eyes Every 1 (One) Hour As Needed.     • potassium chloride (K-DUR,KLOR-CON) 20 MEQ CR tablet Take 1 tablet by mouth Daily. 60 tablet 5   • prasugrel (EFFIENT) 10 MG tablet TAKE 1 TABLET BY MOUTH DAILY. 30 tablet 5   • psyllium (METAMUCIL SMOOTH TEXTURE) 28 % packet Take 1 packet by mouth Daily. 30 each 11   • ranolazine (RANEXA) 1000 MG 12 hr tablet TAKE ONE TABLET BY MOUTH TWO TIMES A DAY 60 tablet 5   • simethicone (Gas-X) 80 MG chewable tablet Chew 1 tablet Every 6 (Six) Hours As Needed for Flatulence. 120 tablet 5   • simvastatin (ZOCOR) 80 MG tablet 1 TABLET(S) BY MOUTH AT BEDTIME 90 tablet 2   • tamsulosin (FLOMAX) 0.4 MG capsule 24 hr capsule   11   • Testosterone Cypionate (DEPOTESTOTERONE CYPIONATE) 200 MG/ML injection Inject 1 mL into the appropriate muscle as directed by prescriber Every 14 (Fourteen) Days. 2 mL 2     Facility-Administered Medications Prior to Visit   Medication Dose Route Frequency Provider Last Rate Last Dose   • Testosterone Cypionate (DEPOTESTOTERONE CYPIONATE) injection 200 mg  200 mg Intramuscular Q14 Days Dunia Cummings MD   200 mg at 08/31/20 1239       Patient Active Problem List   Diagnosis   • Old myocardial infarction   • Hyperkalemia   • History of peptic ulcer   • Hematochezia   • Fever   • Essential hypertension   • Dysuria   • Diverticulosis of sigmoid colon   • Coronary arteriosclerosis   • Chronic gastritis   • Chronic anemia   • Epigastric pain   • Encounter for colonoscopy due to history of adenomatous colonic polyps   • Dysphagia   • Gastroesophageal reflux disease   • Acute on chronic systolic CHF (congestive heart failure), NYHA class 2 (CMS/HCC)   • Coronary artery disease of bypass graft of native heart with stable angina pectoris (CMS/HCC)   • CANDY (acute  "kidney injury) (CMS/[facility])   • Bradycardia   • History of coronary artery bypass graft   • Hyperlipidemia       Advanced Care Planning:  ACP discussion was held with the patient during this visit. Patient has an advance directive in EMR which is still valid.     Review of Systems   Constitutional: Negative for activity change, appetite change, diaphoresis, fatigue, fever and unexpected weight change.   HENT: Negative for congestion, ear pain, hearing loss, sinus pressure, sore throat, tinnitus, trouble swallowing and voice change.    Eyes: Negative.    Respiratory: Positive for shortness of breath.    Cardiovascular: Positive for chest pain.   Gastrointestinal: Positive for nausea. Negative for abdominal distention, abdominal pain, blood in stool, constipation, diarrhea and vomiting.   Endocrine: Negative.    Genitourinary: Negative for decreased urine volume, dysuria, frequency, hematuria and urgency.   Musculoskeletal: Negative.    Skin: Positive for rash.   Allergic/Immunologic: Negative.    Neurological: Positive for weakness. Negative for dizziness, tremors, seizures, syncope, speech difficulty, numbness and headaches.   Hematological: Negative.    Psychiatric/Behavioral: Negative for dysphoric mood and sleep disturbance. The patient is not nervous/anxious.    All other systems reviewed and are negative.      Compared to one year ago, the patient feels his physical health is the same.  Compared to one year ago, the patient feels his mental health is the same.    Reviewed chart for potential of high risk medication in the elderly: yes  Reviewed chart for potential of harmful drug interactions in the elderly:yes    Objective         Vitals:    09/14/20 1024   Weight: 88 kg (194 lb)  Comment: per pt   Height: 175.3 cm (69\")   PainSc: 0-No pain       Body mass index is 28.65 kg/m².  Discussed the patient's BMI with him. The BMI is in the acceptable range.    Physical Exam    Lab Results   Component Value Date    TRIG " 131 07/15/2020    HDL 41 07/15/2020    LDL 50 07/15/2020    VLDL 26.2 07/15/2020        Assessment/Plan   Medicare Risks and Personalized Health Plan  CMS Preventative Services Quick Reference  Cardiovascular risk    The above risks/problems have been discussed with the patient.  Pertinent information has been shared with the patient in the After Visit Summary.  Follow up plans and orders are seen below in the Assessment/Plan Section.    Diagnoses and all orders for this visit:    1. Medicare annual wellness visit, subsequent (Primary)      Follow Up:  No follow-ups on file.     An After Visit Summary and PPPS were given to the patient.           This document has been electronically signed by Dunia Cummings MD on September 14, 2020 12:37 CDT

## 2020-09-14 NOTE — PROGRESS NOTES
Subjective   Dale Mann is a 79 y.o. male.     History of Present Illness    {Common H&P Review Areas:12864}    Review of Systems    Objective   Physical Exam    Assessment/Plan   There are no diagnoses linked to this encounter.

## 2020-09-15 ENCOUNTER — CLINICAL SUPPORT (OUTPATIENT)
Dept: FAMILY MEDICINE CLINIC | Facility: CLINIC | Age: 80
End: 2020-09-15

## 2020-09-15 DIAGNOSIS — E34.9 TESTOSTERONE DEFICIENCY: ICD-10-CM

## 2020-09-15 PROCEDURE — 96372 THER/PROPH/DIAG INJ SC/IM: CPT | Performed by: FAMILY MEDICINE

## 2020-09-15 RX ADMIN — TESTOSTERONE CYPIONATE 200 MG: 200 INJECTION, SOLUTION INTRAMUSCULAR at 10:42

## 2020-09-21 DIAGNOSIS — E34.9 TESTOSTERONE DEFICIENCY: ICD-10-CM

## 2020-09-21 RX ORDER — TESTOSTERONE CYPIONATE 200 MG/ML
200 INJECTION, SOLUTION INTRAMUSCULAR
Qty: 2 ML | Refills: 5 | Status: SHIPPED | OUTPATIENT
Start: 2020-09-21 | End: 2021-03-15 | Stop reason: SDUPTHER

## 2020-09-30 ENCOUNTER — CLINICAL SUPPORT (OUTPATIENT)
Dept: FAMILY MEDICINE CLINIC | Facility: CLINIC | Age: 80
End: 2020-09-30

## 2020-09-30 DIAGNOSIS — E34.9 TESTOSTERONE DEFICIENCY: ICD-10-CM

## 2020-09-30 PROCEDURE — 96372 THER/PROPH/DIAG INJ SC/IM: CPT | Performed by: FAMILY MEDICINE

## 2020-09-30 RX ADMIN — TESTOSTERONE CYPIONATE 200 MG: 200 INJECTION, SOLUTION INTRAMUSCULAR at 10:52

## 2020-10-13 ENCOUNTER — CLINICAL SUPPORT (OUTPATIENT)
Dept: FAMILY MEDICINE CLINIC | Facility: CLINIC | Age: 80
End: 2020-10-13

## 2020-10-13 DIAGNOSIS — E34.9 TESTOSTERONE DEFICIENCY: ICD-10-CM

## 2020-10-13 PROCEDURE — 96372 THER/PROPH/DIAG INJ SC/IM: CPT | Performed by: FAMILY MEDICINE

## 2020-10-13 RX ADMIN — TESTOSTERONE CYPIONATE 200 MG: 200 INJECTION, SOLUTION INTRAMUSCULAR at 15:23

## 2020-10-22 ENCOUNTER — OFFICE VISIT (OUTPATIENT)
Dept: FAMILY MEDICINE CLINIC | Facility: CLINIC | Age: 80
End: 2020-10-22

## 2020-10-22 VITALS — WEIGHT: 194 LBS | HEIGHT: 69 IN | BODY MASS INDEX: 28.73 KG/M2
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
## 2020-10-22 DIAGNOSIS — R07.89 CHEST WALL PAIN: ICD-10-CM

## 2020-10-22 PROCEDURE — 99213 OFFICE O/P EST LOW 20 MIN: CPT | Performed by: FAMILY MEDICINE

## 2020-10-22 RX ORDER — RIVAROXABAN 2.5 MG/1
2.5 TABLET, FILM COATED ORAL DAILY
COMMUNITY
Start: 2020-10-13 | End: 2022-08-10

## 2020-10-22 RX ORDER — CARVEDILOL 6.25 MG/1
6.25 TABLET ORAL 2 TIMES DAILY
COMMUNITY
Start: 2020-10-13
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
## 2020-11-02 ENCOUNTER — CLINICAL SUPPORT (OUTPATIENT)
Dept: FAMILY MEDICINE CLINIC | Facility: CLINIC | Age: 80
End: 2020-11-02

## 2020-11-02 DIAGNOSIS — E34.9 TESTOSTERONE DEFICIENCY: ICD-10-CM

## 2020-11-02 PROCEDURE — 96372 THER/PROPH/DIAG INJ SC/IM: CPT | Performed by: FAMILY MEDICINE

## 2020-11-02 RX ADMIN — TESTOSTERONE CYPIONATE 200 MG: 200 INJECTION, SOLUTION INTRAMUSCULAR at 14:26

## 2020-11-05 NOTE — TELEPHONE ENCOUNTER
Attempted to call patient in regards to going over his medications for his procedure on 05/16/2018. Patient did not answer therefore I left a message identifying who I was and where I was calling from. I also left a callback number for patient to reach me at his earliest convenience.   Prior Authorization Specialty Medication Request    Medication/Dose: buprenorphine (BUTRANS) 5 MCG/HR WK patch  ICD code (if different than what is on RX):    Lumbar radicular pain [M54.16]       DDD (degenerative disc disease), lumbar [M51.36]           Previously Tried and Failed:  ...    Important Lab Values: ...  Rationale: ...    Insurance Name:   Coverage information:     Subscriber: [id number] KAREN WEBER     Rel to sub: 01 - Self     Member ID: [id number]     Payor: 16-[employer] [place] Ph: [phone]     Benefit plan: [facility]-[place] Ph: [phone]     Group number: [id number]     Member effective dates: from 10/01/19            Pharmacy Information (if different than what is on RX)  Name:  [facility]  Phone:  [phone]

## 2020-11-16 ENCOUNTER — CLINICAL SUPPORT (OUTPATIENT)
Dept: FAMILY MEDICINE CLINIC | Facility: CLINIC | Age: 80
End: 2020-11-16

## 2020-11-16 PROCEDURE — 96372 THER/PROPH/DIAG INJ SC/IM: CPT | Performed by: FAMILY MEDICINE

## 2020-11-16 RX ADMIN — TESTOSTERONE CYPIONATE 200 MG: 200 INJECTION, SOLUTION INTRAMUSCULAR at 11:48

## 2020-11-17 RX ORDER — RANOLAZINE 1000 MG/1
TABLET, EXTENDED RELEASE ORAL
Qty: 60 TABLET | Refills: 5 | Status: SHIPPED | OUTPATIENT
Start: 2020-11-17 | End: 2021-05-11

## 2020-11-18 ENCOUNTER — OFFICE VISIT (OUTPATIENT)
Dept: GASTROENTEROLOGY | Facility: CLINIC | Age: 80
End: 2020-11-18

## 2020-11-18 VITALS
HEIGHT: 69 IN | WEIGHT: 196 LBS | DIASTOLIC BLOOD PRESSURE: 76 MMHG | HEART RATE: 65 BPM | BODY MASS INDEX: 29.03 KG/M2 | SYSTOLIC BLOOD PRESSURE: 108 MMHG

## 2020-11-18 DIAGNOSIS — K21.00 GASTROESOPHAGEAL REFLUX DISEASE WITH ESOPHAGITIS WITHOUT HEMORRHAGE: Primary | ICD-10-CM

## 2020-11-18 PROCEDURE — 99213 OFFICE O/P EST LOW 20 MIN: CPT | Performed by: NURSE PRACTITIONER

## 2020-11-18 RX ORDER — DEXLANSOPRAZOLE 60 MG/1
60 CAPSULE, DELAYED RELEASE ORAL DAILY
Qty: 90 CAPSULE | Refills: 1 | Status: SHIPPED | OUTPATIENT
Start: 2020-11-18 | End: 2021-04-12

## 2020-11-18 RX ORDER — SIMETHICONE 80 MG
80 TABLET,CHEWABLE ORAL EVERY 6 HOURS PRN
Qty: 120 TABLET | Refills: 5 | Status: SHIPPED | OUTPATIENT
Start: 2020-11-18 | End: 2021-11-18 | Stop reason: SDUPTHER

## 2020-11-18 NOTE — PROGRESS NOTES
Chief Complaint   Patient presents with   • Heartburn       Subjective    Dale Mann is a 80 y.o. male. he is here today for follow-up.  80-year-old male presents for 6-month recheck regarding GERD, gas and bloating.  States he has done very well on Dexilant and Gas-X as needed.  Will like to continue with current regimen.    Heartburn  He complains of abdominal pain and heartburn. He reports no belching, no chest pain, no choking, no coughing, no dysphagia, no early satiety, no globus sensation, no hoarse voice, no nausea or no sore throat. This is a chronic problem. The current episode started more than 1 year ago. The problem occurs occasionally. The problem has been rapidly worsening. The heartburn duration is less than a minute. The heartburn does not wake him from sleep. The heartburn does not limit his activity. The heartburn doesn't change with position. Associated symptoms include fatigue. He has tried a PPI for the symptoms. The treatment provided mild relief.            The following portions of the patient's history were reviewed and updated as appropriate:   Past Medical History:   Diagnosis Date   • Acid reflux    • Arthritis    • Chronic anemia    • Chronic gastritis    • Coronary arteriosclerosis     hx of CABG then stent placement,  is followed by dr pickard in [place]   • Coronary artery disease of bypass graft of native heart with stable angina pectoris (CMS/Formerly [facility]) 4/11/2019   • Diverticulosis of sigmoid colon    • Dysuria    • Essential hypertension    • Fever    • Hematochezia    • High cholesterol    • History of peptic ulcer    • [place] (hard of hearing)     wearing heading aids david   • Hyperkalemia    • Macular degeneration     left eye only   • Old myocardial infarction    • Other specified anemias    • Screening for malignant neoplasm of prostate    • Upper respiratory infection    • Urinary tract infectious disease      Past Surgical History:   Procedure Laterality Date   • 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 A single polyp was found in the sigmoid colon. Removed by cold biopsy polypectomy. Internal and external hemorrhoids found.   • COLONOSCOPY N/A 5/16/2018    Procedure: COLONOSCOPY;  Surgeon: Ricardo Chamberlain MD;  Location: [facility] ENDOSCOPY;  Service: Gastroenterology   • CORONARY ANGIOPLASTY  2007   • CORONARY ARTERY BYPASS GRAFT     • ENDOSCOPY N/A 5/16/2018    Procedure: ESOPHAGOGASTRODUODENOSCOPY;  Surgeon: Ricardo Chamberlain MD;  Location: [facility] ENDOSCOPY;  Service: Gastroenterology   • INGUINAL HERNIA REPAIR Left 12/8/2017    Procedure: OPEN LEFT INGUINAL HERNIA REPAIR WITH MESH;  Surgeon: Dale Diehl MD;  Location: [facility] OR;  Service:    • PHALLOPLASTY, CIRCUMCISION N/A 5/24/2017    Procedure: CIRCUMCISION;  Surgeon: Dale Lezama MD;  Location: [facility] OR;  Service:    • UPPER GASTROINTESTINAL ENDOSCOPY  01/14/2015    Normal esophagus. Gastritis was found in the stomach. Biopsy taken. Duodenitis was found in the duodenum. Biopsy taken.     Family History   Problem Relation Age of Onset   • Colon polyps Other    • Heart disease Other        Prior to Admission medications    Medication Sig Start Date End Date Taking? Authorizing Provider   aspirin 81 MG EC tablet Take 81 mg by mouth Daily. Last dose 11/27/17   Yes Provider, MD Edgar   carvedilol (COREG) 6.25 MG tablet  10/13/20  Yes Provider, MD Edgar   dexlansoprazole (Dexilant) 60 MG capsule Take 1 capsule by mouth Daily. 5/18/20  Yes Toña Wesley APRN   furosemide (LASIX) 40 MG tablet Take 40 mg by mouth Daily.   Yes Provider, MD Edgar   HYDROcod Polst-CPM Polst ER (TUSSIONEX PENNKINETIC ER) 10-8 MG/5ML ER suspension Take 5 mL by mouth Every 12 (Twelve) Hours As Needed for Cough. 8/5/19  Yes Emiliano  "MD Dunia   isosorbide mononitrate (IMDUR) 60 MG 24 hr tablet TAKE 1 TABLET BY MOUTH DAILY. 6/12/20  Yes Dunia Cummings MD   metoprolol tartrate (LOPRESSOR) 50 MG tablet TAKE ONE TABLET BY MOUTH EVERY DAY REPLACES ATENOLOL 4/11/19  Yes Dunia Cummings MD   nitroglycerin (NITROSTAT) 0.4 MG SL tablet Place 1 tablet under the tongue Every 5 (Five) Minutes As Needed for Chest Pain. Take no more than 3 doses in 15 minutes. 3/25/19  Yes Dunia Cummings MD   polyethyl glycol-propyl glycol (SYSTANE) 0.4-0.3 % solution ophthalmic solution Administer 2 drops to both eyes Every 1 (One) Hour As Needed.   Yes Edgar Parada MD   potassium chloride (K-DUR,KLOR-CON) 20 MEQ CR tablet Take 1 tablet by mouth Daily. 12/30/19  Yes Dunia Cummings MD   prasugrel (EFFIENT) 10 MG tablet TAKE 1 TABLET BY MOUTH DAILY. 10/3/19  Yes Dunia Cummings MD   psyllium (METAMUCIL SMOOTH TEXTURE) 28 % packet Take 1 packet by mouth Daily. 5/25/18  Yes Toña Wesley APRN   ranolazine (RANEXA) 1000 MG 12 hr tablet TAKE ONE TABLET BY MOUTH TWO TIMES A DAY 11/17/20  Yes Dunia Cummings MD   simethicone (Gas-X) 80 MG chewable tablet Chew 1 tablet Every 6 (Six) Hours As Needed for Flatulence. 5/18/20  Yes Toña Wesley APRN   simvastatin (ZOCOR) 80 MG tablet 1 TABLET(S) BY MOUTH AT BEDTIME 4/29/19  Yes Dunia Cummings MD   tamsulosin (FLOMAX) 0.4 MG capsule 24 hr capsule  2/27/19  Yes Edgar Parada MD   Testosterone Cypionate (DEPOTESTOTERONE CYPIONATE) 200 MG/ML injection Inject 1 mL into the appropriate muscle as directed by prescriber Every 14 (Fourteen) Days. 9/21/20  Yes Dunia Cummings MD   Xarelto 2.5 MG tablet  10/13/20  Yes Edgar Parada MD     Allergies   Allergen Reactions   • Norvasc [Amlodipine Besylate] Anaphylaxis   • Ticagrelor Anaphylaxis   • Coumadin [Warfarin Sodium] Other (See Comments)     Excessive bleeding   • Lipitor [Atorvastatin] Other (See Comments)     \"affected movement of heart\"   • " "Clopidogrel Rash     Other reaction(s): rash   • Enalapril Rash   • Plavix [Clopidogrel Bisulfate] Rash     Social History     Socioeconomic History   • Marital status:      Spouse name: Not on file   • Number of children: Not on file   • Years of education: Not on file   • Highest education level: Not on file   Tobacco Use   • Smoking status: Former Smoker     Quit date:      Years since quittin.9   • Smokeless tobacco: Never Used   Substance and Sexual Activity   • Alcohol use: Yes     Comment: socially   • Drug use: No   • Sexual activity: Defer       Review of Systems  Review of Systems   Constitutional: Positive for fatigue. Negative for activity change, appetite change, chills, diaphoresis, fever and unexpected weight change.   HENT: Negative for hoarse voice, sore throat and trouble swallowing.    Respiratory: Negative for cough, choking and shortness of breath.    Cardiovascular: Negative for chest pain.   Gastrointestinal: Positive for abdominal pain and heartburn. Negative for abdominal distention, anal bleeding, blood in stool, constipation, diarrhea, dysphagia, nausea, rectal pain and vomiting.   Musculoskeletal: Negative for arthralgias.   Skin: Negative for pallor.   Neurological: Negative for light-headedness.        /76 (BP Location: Left arm)   Pulse 65   Ht 175.3 cm (69\")   Wt 88.9 kg (196 lb)   BMI 28.94 kg/m²     Objective    Physical Exam  Constitutional:       General: He is not in acute distress.     Appearance: Normal appearance. He is well-developed.   HENT:      Head: Normocephalic and atraumatic.   Neck:      Musculoskeletal: Normal range of motion and neck supple.      Thyroid: No thyromegaly.   Cardiovascular:      Rate and Rhythm: Normal rate and regular rhythm.      Heart sounds: Normal heart sounds.   Pulmonary:      Effort: Pulmonary effort is normal.      Breath sounds: Normal breath sounds. No wheezing, rhonchi or rales.   Abdominal:      General: Bowel " sounds are normal. There is no distension.      Palpations: Abdomen is soft. Abdomen is not rigid.      Tenderness: There is no abdominal tenderness. There is no guarding.      Hernia: No hernia is present.   Lymphadenopathy:      Cervical: No cervical adenopathy.   Skin:     General: Skin is warm and dry.      Coloration: Skin is not pale.      Findings: No rash.   Neurological:      Mental Status: He is alert and oriented to person, place, and time.   Psychiatric:         Speech: Speech normal.         Behavior: Behavior is cooperative.       Lab on 07/15/2020   Component Date Value Ref Range Status   • Total Cholesterol 07/15/2020 117* 150 - 200 mg/dL Final   • Triglycerides 07/15/2020 131  <=150 mg/dL Final   • HDL Cholesterol 07/15/2020 41  40 - 59 mg/dL Final   • LDL Cholesterol  07/15/2020 50  <=100 mg/dL Final   • VLDL Cholesterol 07/15/2020 26.2  mg/dL Final   • LDL/HDL Ratio 07/15/2020 1.21  0.00 - 3.55 Final   • WBC 07/15/2020 4.04  3.40 - 10.80 10*3/mm3 Final   • RBC 07/15/2020 3.99* 4.14 - 5.80 10*6/mm3 Final   • Hemoglobin 07/15/2020 13.2  13.0 - 17.7 g/dL Final   • Hematocrit 07/15/2020 39.9  37.5 - 51.0 % Final   • MCV 07/15/2020 100.0* 79.0 - 97.0 fL Final   • MCH 07/15/2020 33.1* 26.6 - 33.0 pg Final   • MCHC 07/15/2020 33.1  31.5 - 35.7 g/dL Final   • RDW 07/15/2020 15.4  12.3 - 15.4 % Final   • RDW-SD 07/15/2020 54.9* 37.0 - 54.0 fl Final   • MPV 07/15/2020 8.4  6.0 - 12.0 fL Final   • Platelets 07/15/2020 151  140 - 450 10*3/mm3 Final   • Neutrophil % 07/15/2020 54.2  42.7 - 76.0 % Final   • Lymphocyte % 07/15/2020 30.7  19.6 - 45.3 % Final   • Monocyte % 07/15/2020 9.4  5.0 - 12.0 % Final   • Eosinophil % 07/15/2020 5.2  0.3 - 6.2 % Final   • Basophil % 07/15/2020 0.5  0.0 - 1.5 % Final   • Neutrophils, Absolute 07/15/2020 2.19  1.70 - 7.00 10*3/mm3 Final   • Lymphocytes, Absolute 07/15/2020 1.24  0.70 - 3.10 10*3/mm3 Final   • Monocytes, Absolute 07/15/2020 0.38  0.10 - 0.90 10*3/mm3 Final   •  Eosinophils, Absolute 07/15/2020 0.21  0.00 - 0.40 10*3/mm3 Final   • Basophils, Absolute 07/15/2020 0.02  0.00 - 0.20 10*3/mm3 Final     Assessment/Plan      1. Gastroesophageal reflux disease with esophagitis without hemorrhage    .       Orders placed during this encounter include:  No orders of the defined types were placed in this encounter.      * Surgery not found *    Review and/or summary of lab tests, radiology, procedures, medications. Review and summary of old records and obtaining of history. The risks and benefits of my recommendations, as well as other treatment options were discussed with the patient today. Questions were answered.    New Medications Ordered This Visit   Medications   • dexlansoprazole (Dexilant) 60 MG capsule     Sig: Take 1 capsule by mouth Daily.     Dispense:  90 capsule     Refill:  1   • simethicone (Gas-X) 80 MG chewable tablet     Sig: Chew 1 tablet Every 6 (Six) Hours As Needed for Flatulence.     Dispense:  120 tablet     Refill:  5       Follow-up: Return in about 6 months (around 5/18/2021).          This document has been electronically signed by ZENAIDA Montero on November 18, 2020 13:47 CST             Results for orders placed or performed in visit on 07/15/20   CBC Auto Differential    Specimen: Blood   Result Value Ref Range    WBC 4.04 3.40 - 10.80 10*3/mm3    RBC 3.99 (L) 4.14 - 5.80 10*6/mm3    Hemoglobin 13.2 13.0 - 17.7 g/dL    Hematocrit 39.9 37.5 - 51.0 %    .0 (H) 79.0 - 97.0 fL    MCH 33.1 (H) 26.6 - 33.0 pg    MCHC 33.1 31.5 - 35.7 g/dL    RDW 15.4 12.3 - 15.4 %    RDW-SD 54.9 (H) 37.0 - 54.0 fl    MPV 8.4 6.0 - 12.0 fL    Platelets 151 140 - 450 10*3/mm3    Neutrophil % 54.2 42.7 - 76.0 %    Lymphocyte % 30.7 19.6 - 45.3 %    Monocyte % 9.4 5.0 - 12.0 %    Eosinophil % 5.2 0.3 - 6.2 %    Basophil % 0.5 0.0 - 1.5 %    Neutrophils, Absolute 2.19 1.70 - 7.00 10*3/mm3    Lymphocytes, Absolute 1.24 0.70 - 3.10 10*3/mm3    Monocytes, Absolute 0.38  0.10 - 0.90 10*3/mm3    Eosinophils, Absolute 0.21 0.00 - 0.40 10*3/mm3    Basophils, Absolute 0.02 0.00 - 0.20 10*3/mm3   Lipid Panel    Specimen: Blood   Result Value Ref Range    Total Cholesterol 117 (L) 150 - 200 mg/dL    Triglycerides 131 <=150 mg/dL    HDL Cholesterol 41 40 - 59 mg/dL    LDL Cholesterol  50 <=100 mg/dL    VLDL Cholesterol 26.2 mg/dL    LDL/HDL Ratio 1.21 0.00 - 3.55   Results for orders placed or performed in visit on 06/18/20   Comprehensive Metabolic Panel    Specimen: Blood   Result Value Ref Range    Glucose 102 (H) 70 - 99 mg/dL    BUN 25 (H) 7 - 23 mg/dL    Creatinine 1.08 0.70 - 1.30 mg/dL    Sodium 138 137 - 145 mmol/L    Potassium 4.2 3.4 - 5.0 mmol/L    Chloride 103 101 - 112 mmol/L    CO2 30.0 22.0 - 30.0 mmol/L    Calcium 9.2 8.4 - 10.2 mg/dL    Total Protein 7.0 6.3 - 8.6 g/dL    Albumin 3.60 3.50 - 5.00 g/dL    ALT (SGPT) 9 <=50 U/L    AST (SGOT) 18 17 - 59 U/L    Alkaline Phosphatase 101 38 - 126 U/L    Total Bilirubin 0.9 0.2 - 1.3 mg/dL    eGFR Non African Amer 66 42 - 98 mL/min/1.73    Globulin 3.4 2.3 - 3.5 gm/dL    A/G Ratio 1.1 1.1 - 1.8 g/dL    BUN/Creatinine Ratio 23.1 7.0 - 25.0    Anion Gap 5.0 5.0 - 15.0 mmol/L   Results for orders placed or performed in visit on 04/12/19   Testosterone, Free, Total    Specimen: Blood   Result Value Ref Range    Testosterone, Total 517 264 - 916 ng/dL    Testosterone, Free 7.7 6.6 - 18.1 pg/mL   Results for orders placed or performed in visit on 10/08/18   CBC Auto Differential    Specimen: Blood   Result Value Ref Range    WBC 6.12 3.20 - 9.80 10*3/mm3    RBC 4.89 4.37 - 5.74 10*6/mm3    Hemoglobin 14.7 13.7 - 17.3 g/dL    Hematocrit 44.4 39.0 - 49.0 %    MCV 90.8 80.0 - 98.0 fL    MCH 30.1 26.5 - 34.0 pg    MCHC 33.1 31.5 - 36.3 g/dL    RDW 17.6 (H) 11.5 - 14.5 %    RDW-SD 57.2 (H) 35.1 - 43.9 fl    MPV 8.1 8.0 - 12.0 fL    Platelets 127 (L) 150 - 450 10*3/mm3    Neutrophil % 58.2 37.0 - 80.0 %    Lymphocyte % 24.0 10.0 - 50.0 %     Monocyte % 12.7 (H) 0.0 - 12.0 %    Eosinophil % 4.9 0.0 - 7.0 %    Basophil % 0.2 0.0 - 2.0 %    Neutrophils, Absolute 3.56 2.00 - 8.60 10*3/mm3    Lymphocytes, Absolute 1.47 0.60 - 4.20 10*3/mm3    Monocytes, Absolute 0.78 0.00 - 0.90 10*3/mm3    Eosinophils, Absolute 0.30 0.00 - 0.70 10*3/mm3    Basophils, Absolute 0.01 0.00 - 0.20 10*3/mm3   Testosterone, Free, Total    Specimen: Blood   Result Value Ref Range    Testosterone, Total 479 264 - 916 ng/dL    Testosterone, Free 6.9 6.6 - 18.1 pg/mL   TSH    Specimen: Blood   Result Value Ref Range    TSH 1.430 0.460 - 4.680 mIU/mL   T4, Free    Specimen: Blood   Result Value Ref Range    Free T4 1.05 0.78 - 2.19 ng/dL   Lipid Panel    Specimen: Blood   Result Value Ref Range    Total Cholesterol 108 (L) 150 - 200 mg/dL    Triglycerides 127 <=150 mg/dL    HDL Cholesterol 45 40 - 59 mg/dL    LDL Cholesterol  38 <=100 mg/dL    VLDL Cholesterol 25.4 mg/dL    LDL/HDL Ratio 0.84 0.00 - 3.55   Comprehensive Metabolic Panel    Specimen: Blood   Result Value Ref Range    Glucose 114 (H) 74 - 99 mg/dL    BUN 16 9 - 20 mg/dL    Creatinine 1.31 (H) 0.66 - 1.25 mg/dL    Sodium 141 137 - 145 mmol/L    Potassium 4.3 3.4 - 5.0 mmol/L    Chloride 104 98 - 107 mmol/L    CO2 29.0 22.0 - 30.0 mmol/L    Calcium 9.2 8.4 - 10.2 mg/dL    Total Protein 7.5 6.3 - 8.2 g/dL    Albumin 4.00 3.50 - 5.00 g/dL    ALT (SGPT) 16 <=50 U/L    AST (SGOT) 22 17 - 59 U/L    Alkaline Phosphatase 72 38 - 126 U/L    Total Bilirubin 1.1 0.2 - 1.3 mg/dL    eGFR Non African Amer 53 42 - 98 mL/min/1.73    Globulin 3.5 2.3 - 3.5 gm/dL    A/G Ratio 1.1 1.1 - 1.8 g/dL    BUN/Creatinine Ratio 12.2 7.0 - 25.0    Anion Gap 8.0 5.0 - 15.0 mmol/L   Results for orders placed or performed during the hospital encounter of 05/16/18   Tissue Pathology Exam    Specimen: Gastric, Antrum; Tissue   Result Value Ref Range    Case Report       Surgical Pathology Report                         Case: [id number]                       "            Authorizing Provider:  Ricardo Chamberlain MD         Collected:           05/16/2018 02:56 PM          Ordering Location:     [facility]             Received:            05/17/2018 08:02 AM                                 [place]                                                     Pathologist:           Abhinav Lynn MD                                                          Specimen:    Gastric, Antrum, antrum bx                                                                 Final Diagnosis       MUCOSA, ANTRUM OF STOMACH:  CHRONIC GASTRITIS.  NEGATIVE FOR HELICOBACTER PYLORI (HP IMMUNOSTAIN).      Comment       Helicobacter pylori (HP) immunostain is performed because an appropriate inflammatory milieu is present and organisms are not seen on H & E stained slides.    HP immunostain was developed and its performance characteristics determined by [facility].  It has not been cleared or approved by the U.S. Food and Drug Administration.  The FDA has determined that such clearance or approval is not necessary.  This test is used for clinical purposes.  It should not be regarded as investigational or for research.  This laboratory is certified under the Clinical Laboratory Improvement Amendments of 1988 (CLIA-88) as qualified to perform high complexity clinical laboratory testing.          Gross Description       The container is labeled \"antrum of stomach\" and has a nodular fragment of white soft tissue measuring 0.4 cm in greatest dimension.  It is entirely embedded as 1A.      Embedded Images       *Note: Due to a large number of results and/or encounters for the requested time period, some results have not been displayed. A complete set of results can be found in Results Review.     "

## 2020-11-18 NOTE — PATIENT INSTRUCTIONS
Food Choices for Gastroesophageal Reflux Disease, Adult  When you have gastroesophageal reflux disease (GERD), the foods you eat and your eating habits are very important. Choosing the right foods can help ease the discomfort of GERD. Consider working with a diet and nutrition specialist (dietitian) to help you make healthy food choices.  What general guidelines should I follow?    Eating plan  · Choose healthy foods low in fat, such as fruits, vegetables, whole grains, low-fat dairy products, and lean meat, fish, and poultry.  · Eat frequent, small meals instead of three large meals each day. Eat your meals slowly, in a relaxed setting. Avoid bending over or lying down until 2-3 hours after eating.  · Limit high-fat foods such as fatty meats or fried foods.  · Limit your intake of oils, butter, and shortening to less than 8 teaspoons each day.  · Avoid the following:  ? Foods that cause symptoms. These may be different for different people. Keep a food diary to keep track of foods that cause symptoms.  ? Alcohol.  ? Drinking large amounts of liquid with meals.  ? Eating meals during the 2-3 hours before bed.  · Cook foods using methods other than frying. This may include baking, grilling, or broiling.  Lifestyle  · Maintain a healthy weight. Ask your health care provider what weight is healthy for you. If you need to lose weight, work with your health care provider to do so safely.  · Exercise for at least 30 minutes on 5 or more days each week, or as told by your health care provider.  · Avoid wearing clothes that fit tightly around your waist and chest.  · Do not use any products that contain nicotine or tobacco, such as cigarettes and e-cigarettes. If you need help quitting, ask your health care provider.  · Sleep with the head of your bed raised. Use a wedge under the mattress or blocks under the bed frame to raise the head of the bed.  What foods are not recommended?  The items listed may not be a complete  list. Talk with your dietitian about what dietary choices are best for you.  Grains  Pastries or quick breads with added fat. French toast.  Vegetables  Deep fried vegetables. French fries. Any vegetables prepared with added fat. Any vegetables that cause symptoms. For some people this may include tomatoes and tomato products, chili peppers, onions and garlic, and horseradish.  Fruits  Any fruits prepared with added fat. Any fruits that cause symptoms. For some people this may include citrus fruits, such as oranges, grapefruit, pineapple, and seferino.  Meats and other protein foods  High-fat meats, such as fatty beef or pork, hot dogs, ribs, ham, sausage, salami and villela. Fried meat or protein, including fried fish and fried chicken. Nuts and nut butters.  Dairy  Whole milk and chocolate milk. Sour cream. Cream. Ice cream. Cream cheese. Milk shakes.  Beverages  Coffee and tea, with or without caffeine. Carbonated beverages. Sodas. Energy drinks. Fruit juice made with acidic fruits (such as orange or grapefruit). Tomato juice. Alcoholic drinks.  Fats and oils  Butter. Margarine. Shortening. Ghee.  Sweets and desserts  Chocolate and cocoa. Donuts.  Seasoning and other foods  Pepper. Peppermint and spearmint. Any condiments, herbs, or seasonings that cause symptoms. For some people, this may include gaitan, hot sauce, or vinegar-based salad dressings.  Summary  · When you have gastroesophageal reflux disease (GERD), food and lifestyle choices are very important to help ease the discomfort of GERD.  · Eat frequent, small meals instead of three large meals each day. Eat your meals slowly, in a relaxed setting. Avoid bending over or lying down until 2-3 hours after eating.  · Limit high-fat foods such as fatty meat or fried foods.  This information is not intended to replace advice given to you by your health care provider. Make sure you discuss any questions you have with your health care provider.  Document Released:  12/18/2006 Document Revised: 04/09/2020 Document Reviewed: 12/19/2017  Elsevier Patient Education © 2020 [employer]

## 2020-11-19 ENCOUNTER — OFFICE VISIT (OUTPATIENT)
Dept: FAMILY MEDICINE CLINIC | Facility: CLINIC | Age: 80
End: 2020-11-19
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
## 2020-11-19 DIAGNOSIS — R07.81 RIB PAIN: ICD-10-CM

## 2020-11-19 PROCEDURE — 99213 OFFICE O/P EST LOW 20 MIN: CPT | Performed by: FAMILY MEDICINE
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
## 2020-11-30 ENCOUNTER — CLINICAL SUPPORT (OUTPATIENT)
Dept: FAMILY MEDICINE CLINIC | Facility: CLINIC | Age: 80
End: 2020-11-30

## 2020-11-30 DIAGNOSIS — D64.9 CHRONIC ANEMIA: ICD-10-CM

## 2020-11-30 PROCEDURE — 96372 THER/PROPH/DIAG INJ SC/IM: CPT | Performed by: FAMILY MEDICINE

## 2020-11-30 RX ADMIN — TESTOSTERONE CYPIONATE 200 MG: 200 INJECTION, SOLUTION INTRAMUSCULAR at 15:21

## 2020-12-29 RX ORDER — POTASSIUM CHLORIDE 20 MEQ/1
20 TABLET, EXTENDED RELEASE ORAL DAILY
Qty: 90 TABLET | Refills: 5 | Status: SHIPPED | OUTPATIENT
Start: 2020-12-29 | End: 2022-01-11

## 2020-12-29 RX ORDER — NITROGLYCERIN 0.4 MG/1
0.4 TABLET SUBLINGUAL
Qty: 25 TABLET | Refills: 5 | Status: SHIPPED | OUTPATIENT
Start: 2020-12-29 | End: 2022-06-16

## 2021-01-19 ENCOUNTER — CLINICAL SUPPORT (OUTPATIENT)
Dept: FAMILY MEDICINE CLINIC | Facility: CLINIC | Age: 81
End: 2021-01-19

## 2021-01-19 DIAGNOSIS — E34.9 TESTOSTERONE DEFICIENCY: ICD-10-CM

## 2021-01-19 PROCEDURE — 96372 THER/PROPH/DIAG INJ SC/IM: CPT | Performed by: FAMILY MEDICINE

## 2021-01-19 RX ADMIN — TESTOSTERONE CYPIONATE 200 MG: 200 INJECTION, SOLUTION INTRAMUSCULAR at 10:57

## 2021-02-01 ENCOUNTER — CLINICAL SUPPORT (OUTPATIENT)
Dept: FAMILY MEDICINE CLINIC | Facility: CLINIC | Age: 81
End: 2021-02-01

## 2021-02-01 PROCEDURE — 96372 THER/PROPH/DIAG INJ SC/IM: CPT | Performed by: FAMILY MEDICINE

## 2021-02-01 RX ADMIN — TESTOSTERONE CYPIONATE 200 MG: 200 INJECTION, SOLUTION INTRAMUSCULAR at 11:26

## 2021-03-03 RX ORDER — ISOSORBIDE MONONITRATE 60 MG/1
60 TABLET, EXTENDED RELEASE ORAL DAILY
Qty: 30 TABLET | Refills: 5 | Status: SHIPPED | OUTPATIENT
Start: 2021-03-03 | End: 2023-03-16

## 2021-03-15 ENCOUNTER — CLINICAL SUPPORT (OUTPATIENT)
Dept: FAMILY MEDICINE CLINIC | Facility: CLINIC | Age: 81
End: 2021-03-15

## 2021-03-15 DIAGNOSIS — E34.9 TESTOSTERONE DEFICIENCY: ICD-10-CM

## 2021-03-15 PROCEDURE — 96372 THER/PROPH/DIAG INJ SC/IM: CPT | Performed by: FAMILY MEDICINE

## 2021-03-15 RX ORDER — TESTOSTERONE CYPIONATE 200 MG/ML
200 INJECTION, SOLUTION INTRAMUSCULAR
Qty: 2 ML | Refills: 5 | Status: SHIPPED | OUTPATIENT
Start: 2021-03-15 | End: 2021-09-09

## 2021-03-15 RX ADMIN — TESTOSTERONE CYPIONATE 200 MG: 200 INJECTION, SOLUTION INTRAMUSCULAR at 11:30

## 2021-03-16 RX ORDER — FUROSEMIDE 40 MG/1
40 TABLET ORAL DAILY
Qty: 30 TABLET | Refills: 5 | Status: SHIPPED | OUTPATIENT
Start: 2021-03-16 | End: 2021-12-02

## 2021-03-31 ENCOUNTER — CLINICAL SUPPORT (OUTPATIENT)
Dept: FAMILY MEDICINE CLINIC | Facility: CLINIC | Age: 81
End: 2021-03-31

## 2021-03-31 DIAGNOSIS — E34.9 TESTOSTERONE DEFICIENCY: Primary | ICD-10-CM

## 2021-03-31 PROCEDURE — 96372 THER/PROPH/DIAG INJ SC/IM: CPT | Performed by: FAMILY MEDICINE

## 2021-03-31 RX ADMIN — TESTOSTERONE CYPIONATE 200 MG: 200 INJECTION, SOLUTION INTRAMUSCULAR at 07:05

## 2021-04-12 RX ORDER — DEXLANSOPRAZOLE 60 MG/1
60 CAPSULE, DELAYED RELEASE ORAL DAILY
Qty: 90 CAPSULE | Refills: 1 | Status: SHIPPED | OUTPATIENT
Start: 2021-04-12 | End: 2021-11-18 | Stop reason: SDUPTHER

## 2021-04-15 ENCOUNTER — CLINICAL SUPPORT (OUTPATIENT)
Dept: FAMILY MEDICINE CLINIC | Facility: CLINIC | Age: 81
End: 2021-04-15

## 2021-04-15 DIAGNOSIS — E34.9 TESTOSTERONE DEFICIENCY: ICD-10-CM

## 2021-04-15 PROCEDURE — 96372 THER/PROPH/DIAG INJ SC/IM: CPT | Performed by: FAMILY MEDICINE

## 2021-04-15 RX ADMIN — TESTOSTERONE CYPIONATE 200 MG: 200 INJECTION, SOLUTION INTRAMUSCULAR at 11:14

## 2021-05-03 ENCOUNTER — CLINICAL SUPPORT (OUTPATIENT)
Dept: FAMILY MEDICINE CLINIC | Facility: CLINIC | Age: 81
End: 2021-05-03

## 2021-05-03 DIAGNOSIS — R79.89 LOW TESTOSTERONE IN MALE: ICD-10-CM

## 2021-05-03 PROCEDURE — 96372 THER/PROPH/DIAG INJ SC/IM: CPT | Performed by: FAMILY MEDICINE

## 2021-05-03 RX ADMIN — TESTOSTERONE CYPIONATE 200 MG: 200 INJECTION, SOLUTION INTRAMUSCULAR at 11:11

## 2021-05-11 RX ORDER — RANOLAZINE 1000 MG/1
TABLET, EXTENDED RELEASE ORAL
Qty: 60 TABLET | Refills: 5 | Status: SHIPPED | OUTPATIENT
Start: 2021-05-11

## 2021-05-17 ENCOUNTER — CLINICAL SUPPORT (OUTPATIENT)
Dept: FAMILY MEDICINE CLINIC | Facility: CLINIC | Age: 81
End: 2021-05-17

## 2021-05-17 PROCEDURE — 96372 THER/PROPH/DIAG INJ SC/IM: CPT | Performed by: FAMILY MEDICINE

## 2021-05-17 RX ADMIN — TESTOSTERONE CYPIONATE 200 MG: 200 INJECTION, SOLUTION INTRAMUSCULAR at 11:12

## 2021-05-18 ENCOUNTER — OFFICE VISIT (OUTPATIENT)
Dept: GASTROENTEROLOGY | Facility: CLINIC | Age: 81
End: 2021-05-18

## 2021-05-18 VITALS
HEIGHT: 70 IN | BODY MASS INDEX: 28.06 KG/M2 | SYSTOLIC BLOOD PRESSURE: 129 MMHG | HEART RATE: 68 BPM | WEIGHT: 196 LBS | DIASTOLIC BLOOD PRESSURE: 63 MMHG

## 2021-05-18 DIAGNOSIS — K21.00 GASTROESOPHAGEAL REFLUX DISEASE WITH ESOPHAGITIS WITHOUT HEMORRHAGE: Primary | ICD-10-CM

## 2021-05-18 PROCEDURE — 99212 OFFICE O/P EST SF 10 MIN: CPT | Performed by: NURSE PRACTITIONER

## 2021-05-18 RX ORDER — ATORVASTATIN CALCIUM 40 MG/1
TABLET, FILM COATED ORAL
COMMUNITY
Start: 2021-03-15 | End: 2021-06-22

## 2021-05-18 NOTE — PATIENT INSTRUCTIONS
Food Choices for Gastroesophageal Reflux Disease, Adult  When you have gastroesophageal reflux disease (GERD), the foods you eat and your eating habits are very important. Choosing the right foods can help ease your discomfort. Think about working with a food expert (dietitian) to help you make good choices.  What are tips for following this plan?  Reading food labels  · Read the label for foods that are low in saturated fat. Foods that may help with your symptoms include:  ? Foods that have less than 5% of daily value (DV) of fat.  ? Foods that have 0 grams of trans fat.  Cooking  · Do not morales your food. Cook your food by baking, steaming, grilling, or broiling. These are all methods that do not need a lot of fat for cooking.  · To add flavor, try to use herbs that are low in spice and acidity.  Meal planning    · Choose healthy foods that are low in fat, such as:  ? Fruits and vegetables.  ? Whole grains.  ? Low-fat dairy products.  ? Lean meats, fish, and poultry.  · Eat small meals often instead of eating 3 large meals each day. Eat your meals slowly in a place where you are relaxed. Avoid bending over or lying down until 2-3 hours after eating.  · Limit high-fat foods such as fatty meats or fried foods.  · Limit your intake of oils, butter, and shortening to less than 8 teaspoons each day.  · Avoid the following:  ? Foods that cause symptoms. These may be different for different people. Keep a food diary to keep track of foods that cause symptoms.  ? Alcohol.  ? Drinking a lot of liquid with meals.  ? Eating meals during the 2-3 hours before bed.  Lifestyle  · Stay at a healthy weight. Ask your doctor what weight is healthy for you. If you need to lose weight, work with your doctor to do so safely.  · Exercise for at least 30 minutes on 5 or more days each week, or as told by your doctor.  · Wear loose-fitting clothes.  · Do not use any products that contain nicotine or tobacco, such as cigarettes,  e-cigarettes, and chewing tobacco. If you need help quitting, ask your doctor.  · Sleep with the head of your bed higher than your feet. Use a wedge under the mattress or blocks under the bed frame to raise the head of the bed.  What foods should eat?    Eat a healthy, well-balanced diet of fruits, vegetables, whole grains, low-fat dairy products, lean meats, fish, and poultry. Each person is different. Foods that may cause symptoms in one person may not cause any symptoms in another person. Work with your doctor to find foods that are safe for you.  The items listed above may not be a complete list of foods and beverages you can eat. Contact a dietitian for more information.  What foods should I avoid?  Limiting some of these foods may help in managing the symptoms of GERD. Everyone is different. Talk with a food expert or your doctor to help you find the exact foods to avoid, if any.  Fruits  Any fruits prepared with added fat. Any fruits that cause symptoms. For some people, this may include citrus fruits, such as oranges, grapefruit, pineapple, and seferino.  Vegetables  Deep-fried vegetables. French fries. Any vegetables prepared with added fat. Any vegetables that cause symptoms. For some people, this may include tomatoes and tomato products, chili peppers, onions and garlic, and horseradish.  Grains  Pastries or quick breads with added fat.  Meats and other proteins  High-fat meats, such as fatty beef or pork, hot dogs, ribs, ham, sausage, salami, and villela. Fried meat or protein, including fried fish and fried chicken. Nuts and nut butters.  Dairy  Whole milk and chocolate milk. Sour cream. Cream. Ice cream. Cream cheese. Milkshakes.  Fats and oils  Butter. Margarine. Shortening. Ghee.  Beverages  Coffee and tea, with or without caffeine. Carbonated beverages. Sodas. Energy drinks. Fruit juice made with acidic fruits (such as orange or grapefruit). Tomato juice. Alcoholic drinks.  Sweets and  desserts  Chocolate and cocoa. Donuts.  Seasonings and condiments  Pepper. Peppermint and spearmint. Added salt. Any condiments, herbs, or seasonings that cause symptoms. For some people, this may include gaitan, hot sauce, or vinegar-based salad dressings.  The items listed above may not be a complete list of foods and beverages you should avoid. Contact a dietitian for more information.  Questions to ask your doctor  Diet and lifestyle changes are often the first steps that are taken to manage symptoms of GERD. If diet and lifestyle changes do not help, talk with your doctor about taking medicines.  Where to find more information  · [employer]: aboutgerd.org  Summary  · When you have GERD, food and lifestyle choices are very important in easing your symptoms.  · Eat small meals often instead of 3 large meals a day. Eat your meals slowly and in a place where you are relaxed.  · Avoid bending over or lying down until 2-3 hours after eating.  · Limit high-fat foods such as fatty meat or fried foods.  This information is not intended to replace advice given to you by your health care provider. Make sure you discuss any questions you have with your health care provider.  Document Revised: 10/12/2020 Document Reviewed: 10/12/2020  [employer] Patient Education © 2021 [employer]
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
## 2021-06-01 ENCOUNTER — CLINICAL SUPPORT (OUTPATIENT)
Dept: FAMILY MEDICINE CLINIC | Facility: CLINIC | Age: 81
End: 2021-06-01

## 2021-06-01 DIAGNOSIS — E34.9 TESTOSTERONE DEFICIENCY: ICD-10-CM

## 2021-06-01 PROCEDURE — 96372 THER/PROPH/DIAG INJ SC/IM: CPT | Performed by: FAMILY MEDICINE

## 2021-06-01 RX ADMIN — TESTOSTERONE CYPIONATE 200 MG: 200 INJECTION, SOLUTION INTRAMUSCULAR at 09:55

## 2021-06-15 ENCOUNTER — CLINICAL SUPPORT (OUTPATIENT)
Dept: FAMILY MEDICINE CLINIC | Facility: CLINIC | Age: 81
End: 2021-06-15

## 2021-06-15 DIAGNOSIS — E34.9 TESTOSTERONE DEFICIENCY: ICD-10-CM

## 2021-06-15 PROCEDURE — 96372 THER/PROPH/DIAG INJ SC/IM: CPT | Performed by: FAMILY MEDICINE

## 2021-06-15 RX ADMIN — TESTOSTERONE CYPIONATE 200 MG: 200 INJECTION, SOLUTION INTRAMUSCULAR at 09:55

## 2021-06-22 ENCOUNTER — OFFICE VISIT (OUTPATIENT)
Dept: FAMILY MEDICINE CLINIC | Facility: CLINIC | Age: 81
End: 2021-06-22

## 2021-06-22 VITALS — BODY MASS INDEX: 26.77 KG/M2 | WEIGHT: 187 LBS | HEIGHT: 70 IN

## 2021-06-22 DIAGNOSIS — I25.708 CORONARY ARTERY DISEASE OF BYPASS GRAFT OF NATIVE HEART WITH STABLE ANGINA PECTORIS (HCC): ICD-10-CM

## 2021-06-22 DIAGNOSIS — I10 ESSENTIAL HYPERTENSION: ICD-10-CM

## 2021-06-22 DIAGNOSIS — N52.1 ERECTILE DYSFUNCTION DUE TO DISEASES CLASSIFIED ELSEWHERE: Primary | ICD-10-CM

## 2021-06-22 DIAGNOSIS — E34.9 TESTOSTERONE DEFICIENCY: ICD-10-CM

## 2021-06-22 PROCEDURE — 99214 OFFICE O/P EST MOD 30 MIN: CPT | Performed by: FAMILY MEDICINE

## 2021-06-22 NOTE — PROGRESS NOTES
"Subjective   Dale Mann is a 80 y.o. male.   Patient with coronary disease hypertension and low testosterone.  Here today with concern about erectile dysfunction.  He has been on testosterone which did not help.  He is not a candidate for Viagra ordered those types of medications as he is on chronic nitrates for angina.  He does not think he would be able to use the Caverject himself.  We discussed that other options and the only other thing I can come up with would be the Muse transurethral tablet.  He has difficulty obtaining and sustaining the erection.    History of Present Illness    The following portions of the patient's history were reviewed and updated as appropriate: allergies, current medications, past family history, past medical history, past social history, past surgical history and problem list.    Review of Systems   Constitutional: Negative for activity change, appetite change, diaphoresis, fatigue, fever and unexpected weight change.   HENT: Negative for congestion, ear pain, hearing loss, sinus pressure, sore throat, tinnitus, trouble swallowing and voice change.    Eyes: Negative.    Gastrointestinal: Negative for abdominal distention, abdominal pain, blood in stool, constipation, diarrhea and vomiting.   Endocrine: Negative.    Genitourinary: Negative for decreased urine volume, dysuria, frequency, hematuria and urgency.   Musculoskeletal: Negative.    Allergic/Immunologic: Negative.    Neurological: Negative for dizziness, tremors, seizures, syncope, speech difficulty, numbness and headaches.   Hematological: Negative.    Psychiatric/Behavioral: Negative for dysphoric mood and sleep disturbance. The patient is not nervous/anxious.    All other systems reviewed and are negative.      Objective    Body mass index is 26.83 kg/m².  Vitals:    06/22/21 1012   Weight: 84.8 kg (187 lb)   Height: 177.8 cm (70\")       Physical Exam  Vitals and nursing note reviewed.   Constitutional:       " General: He is not in acute distress.     Appearance: He is well-developed.   HENT:      Head: Normocephalic and atraumatic.      Nose: Nose normal.   Eyes:      Conjunctiva/sclera: Conjunctivae normal.      Pupils: Pupils are equal, round, and reactive to light.   Neck:      Thyroid: No thyromegaly.      Vascular: No JVD.      Trachea: No tracheal deviation.   Cardiovascular:      Rate and Rhythm: Normal rate and regular rhythm.      Heart sounds: Normal heart sounds. No murmur heard.     Pulmonary:      Effort: Pulmonary effort is normal.      Breath sounds: Normal breath sounds. No wheezing.   Chest:      Chest wall: No tenderness.   Abdominal:      General: Bowel sounds are normal. There is no distension.      Palpations: Abdomen is soft. There is no mass.      Tenderness: There is no abdominal tenderness.   Musculoskeletal:         General: No tenderness. Normal range of motion.      Cervical back: Normal range of motion and neck supple.   Lymphadenopathy:      Cervical: No cervical adenopathy.   Skin:     General: Skin is warm and dry.      Coloration: Skin is not pale.      Findings: No erythema or rash.   Neurological:      Mental Status: He is alert and oriented to person, place, and time.      Motor: No abnormal muscle tone.      Coordination: Coordination normal.         Assessment/Plan   Diagnoses and all orders for this visit:    1. Erectile dysfunction due to diseases classified elsewhere (Primary)  -     alprostadil (MUSE) 125 MCG pellet; 1 each by Transurethral route As Needed for Erectile Dysfunction. use no more than 3 times per week  Dispense: 6 each; Refill: 12    2. Testosterone deficiency  -     Testosterone, Free, Total    3. Essential hypertension  -     Comprehensive Metabolic Panel  -     CBC & Differential; Future  -     TSH  -     T4, Free    4. Coronary artery disease of bypass graft of native heart with stable angina pectoris (CMS/HCC)  -     Lipid Panel; Future    We will try the MUSE  transurethral tablets as he is really not a candidate for any other treatments.  Instructions are given for use and will follow up with him in 3 months or sooner for problems    We will recheck labs including testosterone levels and labs for hypertension and lipids, as above.  He will follow-up with cardiology as scheduled.          This document has been electronically signed by Dunia Cummings MD on June 22, 2021 17:38 CDT

## 2021-07-01 ENCOUNTER — CLINICAL SUPPORT (OUTPATIENT)
Dept: FAMILY MEDICINE CLINIC | Facility: CLINIC | Age: 81
End: 2021-07-01

## 2021-07-01 DIAGNOSIS — E34.9 TESTOSTERONE DEFICIENCY: ICD-10-CM

## 2021-07-01 PROCEDURE — 96372 THER/PROPH/DIAG INJ SC/IM: CPT | Performed by: FAMILY MEDICINE

## 2021-07-01 RX ADMIN — TESTOSTERONE CYPIONATE 200 MG: 200 INJECTION, SOLUTION INTRAMUSCULAR at 10:33

## 2021-07-19 ENCOUNTER — CLINICAL SUPPORT (OUTPATIENT)
Dept: FAMILY MEDICINE CLINIC | Facility: CLINIC | Age: 81
End: 2021-07-19

## 2021-07-19 DIAGNOSIS — E34.9 TESTOSTERONE DEFICIENCY: ICD-10-CM

## 2021-07-19 PROCEDURE — 96372 THER/PROPH/DIAG INJ SC/IM: CPT | Performed by: FAMILY MEDICINE

## 2021-07-19 RX ADMIN — TESTOSTERONE CYPIONATE 200 MG: 200 INJECTION, SOLUTION INTRAMUSCULAR at 15:46

## 2021-07-20 ENCOUNTER — LAB (OUTPATIENT)
Dept: LAB | Facility: OTHER | Age: 81
End: 2021-07-20

## 2021-07-20 DIAGNOSIS — I10 ESSENTIAL HYPERTENSION: ICD-10-CM

## 2021-07-20 DIAGNOSIS — I25.708 CORONARY ARTERY DISEASE OF BYPASS GRAFT OF NATIVE HEART WITH STABLE ANGINA PECTORIS (HCC): ICD-10-CM

## 2021-07-20 LAB
ALBUMIN SERPL-MCNC: 4 G/DL (ref 3.5–5)
ALBUMIN/GLOB SERPL: 1 G/DL (ref 1.1–1.8)
ALP SERPL-CCNC: 139 U/L (ref 38–126)
ALT SERPL W P-5'-P-CCNC: 13 U/L
ANION GAP SERPL CALCULATED.3IONS-SCNC: 5 MMOL/L (ref 5–15)
AST SERPL-CCNC: 22 U/L (ref 17–59)
BASOPHILS # BLD MANUAL: 0.06 10*3/MM3 (ref 0–0.2)
BASOPHILS NFR BLD AUTO: 1 % (ref 0–1.5)
BILIRUB SERPL-MCNC: 1 MG/DL (ref 0.2–1.3)
BUN SERPL-MCNC: 21 MG/DL (ref 7–23)
BUN/CREAT SERPL: 18.4 (ref 7–25)
CALCIUM SPEC-SCNC: 9.1 MG/DL (ref 8.4–10.2)
CHLORIDE SERPL-SCNC: 103 MMOL/L (ref 101–112)
CHOLEST SERPL-MCNC: 111 MG/DL (ref 150–200)
CO2 SERPL-SCNC: 30 MMOL/L (ref 22–30)
CREAT SERPL-MCNC: 1.14 MG/DL (ref 0.7–1.3)
DEPRECATED RDW RBC AUTO: 57.5 FL (ref 37–54)
EOSINOPHIL # BLD MANUAL: 0.17 10*3/MM3 (ref 0–0.4)
EOSINOPHIL NFR BLD MANUAL: 3 % (ref 0.3–6.2)
ERYTHROCYTE [DISTWIDTH] IN BLOOD BY AUTOMATED COUNT: 17.5 % (ref 12.3–15.4)
GFR SERPL CREATININE-BSD FRML MDRD: 62 ML/MIN/1.73 (ref 42–98)
GLOBULIN UR ELPH-MCNC: 3.9 GM/DL (ref 2.3–3.5)
GLUCOSE SERPL-MCNC: 100 MG/DL (ref 70–99)
HCT VFR BLD AUTO: 42.6 % (ref 37.5–51)
HDLC SERPL-MCNC: 33 MG/DL (ref 40–59)
HGB BLD-MCNC: 14.5 G/DL (ref 13–17.7)
LDLC SERPL CALC-MCNC: 60 MG/DL
LDLC/HDLC SERPL: 1.81 {RATIO} (ref 0–3.55)
LYMPHOCYTES # BLD MANUAL: 1.03 10*3/MM3 (ref 0.7–3.1)
LYMPHOCYTES NFR BLD MANUAL: 18 % (ref 19.6–45.3)
LYMPHOCYTES NFR BLD MANUAL: 8 % (ref 5–12)
MCH RBC QN AUTO: 31.7 PG (ref 26.6–33)
MCHC RBC AUTO-ENTMCNC: 34 G/DL (ref 31.5–35.7)
MCV RBC AUTO: 93.2 FL (ref 79–97)
MONOCYTES # BLD AUTO: 0.46 10*3/MM3 (ref 0.1–0.9)
NEUTROPHILS # BLD AUTO: 4.02 10*3/MM3 (ref 1.7–7)
NEUTROPHILS NFR BLD MANUAL: 70 % (ref 42.7–76)
PLATELET # BLD AUTO: 141 10*3/MM3 (ref 140–450)
PMV BLD AUTO: 8.6 FL (ref 6–12)
POTASSIUM SERPL-SCNC: 4.7 MMOL/L (ref 3.4–5)
PROT SERPL-MCNC: 7.9 G/DL (ref 6.3–8.6)
RBC # BLD AUTO: 4.57 10*6/MM3 (ref 4.14–5.8)
RBC MORPH BLD: NORMAL
SMALL PLATELETS BLD QL SMEAR: ADEQUATE
SODIUM SERPL-SCNC: 138 MMOL/L (ref 137–145)
TRIGL SERPL-MCNC: 92 MG/DL
VLDLC SERPL-MCNC: 18 MG/DL (ref 5–40)
WBC # BLD AUTO: 5.74 10*3/MM3 (ref 3.4–10.8)
WBC MORPH BLD: NORMAL

## 2021-07-20 PROCEDURE — 85025 COMPLETE CBC W/AUTO DIFF WBC: CPT | Performed by: FAMILY MEDICINE

## 2021-07-20 PROCEDURE — 84403 ASSAY OF TOTAL TESTOSTERONE: CPT | Performed by: FAMILY MEDICINE

## 2021-07-20 PROCEDURE — 84402 ASSAY OF FREE TESTOSTERONE: CPT | Performed by: FAMILY MEDICINE

## 2021-07-20 PROCEDURE — 84439 ASSAY OF FREE THYROXINE: CPT | Performed by: FAMILY MEDICINE

## 2021-07-20 PROCEDURE — 80061 LIPID PANEL: CPT | Performed by: FAMILY MEDICINE

## 2021-07-20 PROCEDURE — 80053 COMPREHEN METABOLIC PANEL: CPT | Performed by: FAMILY MEDICINE

## 2021-07-20 PROCEDURE — 84443 ASSAY THYROID STIM HORMONE: CPT | Performed by: FAMILY MEDICINE

## 2021-07-20 PROCEDURE — 36415 COLL VENOUS BLD VENIPUNCTURE: CPT | Performed by: FAMILY MEDICINE

## 2021-07-21 LAB
T4 FREE SERPL-MCNC: 1.04 NG/DL (ref 0.93–1.7)
TSH SERPL DL<=0.05 MIU/L-ACNC: 2.03 UIU/ML (ref 0.27–4.2)

## 2021-07-25 LAB
TESTOST FREE SERPL-MCNC: 25.2 PG/ML (ref 6.6–18.1)
TESTOST SERPL-MCNC: >1500 NG/DL (ref 264–916)

## 2021-08-02 ENCOUNTER — CLINICAL SUPPORT (OUTPATIENT)
Dept: FAMILY MEDICINE CLINIC | Facility: CLINIC | Age: 81
End: 2021-08-02

## 2021-08-02 PROCEDURE — 96372 THER/PROPH/DIAG INJ SC/IM: CPT | Performed by: FAMILY MEDICINE

## 2021-08-02 RX ADMIN — TESTOSTERONE CYPIONATE 200 MG: 200 INJECTION, SOLUTION INTRAMUSCULAR at 11:24

## 2021-08-16 ENCOUNTER — CLINICAL SUPPORT (OUTPATIENT)
Dept: FAMILY MEDICINE CLINIC | Facility: CLINIC | Age: 81
End: 2021-08-16

## 2021-08-16 DIAGNOSIS — E34.9 TESTOSTERONE DEFICIENCY: ICD-10-CM

## 2021-08-16 PROCEDURE — 96372 THER/PROPH/DIAG INJ SC/IM: CPT | Performed by: FAMILY MEDICINE

## 2021-08-16 RX ADMIN — TESTOSTERONE CYPIONATE 200 MG: 200 INJECTION, SOLUTION INTRAMUSCULAR at 11:24

## 2021-08-31 ENCOUNTER — CLINICAL SUPPORT (OUTPATIENT)
Dept: FAMILY MEDICINE CLINIC | Facility: CLINIC | Age: 81
End: 2021-08-31

## 2021-08-31 DIAGNOSIS — E34.9 TESTOSTERONE DEFICIENCY: ICD-10-CM

## 2021-08-31 PROCEDURE — 96372 THER/PROPH/DIAG INJ SC/IM: CPT | Performed by: FAMILY MEDICINE

## 2021-09-01 RX ADMIN — TESTOSTERONE CYPIONATE 200 MG: 200 INJECTION, SOLUTION INTRAMUSCULAR at 10:05

## 2021-09-07 DIAGNOSIS — E34.9 TESTOSTERONE DEFICIENCY: ICD-10-CM

## 2021-09-09 RX ORDER — TESTOSTERONE CYPIONATE 200 MG/ML
200 INJECTION, SOLUTION INTRAMUSCULAR
Qty: 2 ML | Refills: 5 | Status: SHIPPED | OUTPATIENT
Start: 2021-09-09 | End: 2022-03-02

## 2021-09-14 ENCOUNTER — CLINICAL SUPPORT (OUTPATIENT)
Dept: FAMILY MEDICINE CLINIC | Facility: CLINIC | Age: 81
End: 2021-09-14

## 2021-09-14 DIAGNOSIS — E34.9 TESTOSTERONE DEFICIENCY: ICD-10-CM

## 2021-09-14 PROCEDURE — 96372 THER/PROPH/DIAG INJ SC/IM: CPT | Performed by: FAMILY MEDICINE

## 2021-09-14 RX ADMIN — TESTOSTERONE CYPIONATE 200 MG: 200 INJECTION, SOLUTION INTRAMUSCULAR at 11:23

## 2021-09-30 ENCOUNTER — CLINICAL SUPPORT (OUTPATIENT)
Dept: FAMILY MEDICINE CLINIC | Facility: CLINIC | Age: 81
End: 2021-09-30

## 2021-09-30 DIAGNOSIS — E34.9 TESTOSTERONE DEFICIENCY: ICD-10-CM

## 2021-09-30 PROCEDURE — 96372 THER/PROPH/DIAG INJ SC/IM: CPT | Performed by: FAMILY MEDICINE

## 2021-09-30 RX ADMIN — TESTOSTERONE CYPIONATE 200 MG: 200 INJECTION, SOLUTION INTRAMUSCULAR at 10:11

## 2021-10-14 ENCOUNTER — CLINICAL SUPPORT (OUTPATIENT)
Dept: FAMILY MEDICINE CLINIC | Facility: CLINIC | Age: 81
End: 2021-10-14

## 2021-10-14 DIAGNOSIS — E34.9 TESTOSTERONE DEFICIENCY: ICD-10-CM

## 2021-10-14 PROCEDURE — 96372 THER/PROPH/DIAG INJ SC/IM: CPT | Performed by: FAMILY MEDICINE

## 2021-10-14 RX ADMIN — TESTOSTERONE CYPIONATE 200 MG: 200 INJECTION, SOLUTION INTRAMUSCULAR at 11:04

## 2021-10-28 ENCOUNTER — CLINICAL SUPPORT (OUTPATIENT)
Dept: FAMILY MEDICINE CLINIC | Facility: CLINIC | Age: 81
End: 2021-10-28

## 2021-10-28 DIAGNOSIS — E34.9 TESTOSTERONE DEFICIENCY: ICD-10-CM

## 2021-10-28 PROCEDURE — 96372 THER/PROPH/DIAG INJ SC/IM: CPT | Performed by: FAMILY MEDICINE

## 2021-10-28 RX ADMIN — TESTOSTERONE CYPIONATE 200 MG: 200 INJECTION, SOLUTION INTRAMUSCULAR at 09:11

## 2021-11-15 ENCOUNTER — CLINICAL SUPPORT (OUTPATIENT)
Dept: FAMILY MEDICINE CLINIC | Facility: CLINIC | Age: 81
End: 2021-11-15

## 2021-11-15 DIAGNOSIS — E34.9 TESTOSTERONE DEFICIENCY: ICD-10-CM

## 2021-11-15 PROCEDURE — 96372 THER/PROPH/DIAG INJ SC/IM: CPT | Performed by: FAMILY MEDICINE

## 2021-11-15 RX ADMIN — TESTOSTERONE CYPIONATE 200 MG: 200 INJECTION, SOLUTION INTRAMUSCULAR at 13:34

## 2021-11-18 ENCOUNTER — OFFICE VISIT (OUTPATIENT)
Dept: GASTROENTEROLOGY | Facility: CLINIC | Age: 81
End: 2021-11-18

## 2021-11-18 VITALS
HEIGHT: 70 IN | BODY MASS INDEX: 26.92 KG/M2 | HEART RATE: 71 BPM | WEIGHT: 188 LBS | SYSTOLIC BLOOD PRESSURE: 115 MMHG | DIASTOLIC BLOOD PRESSURE: 64 MMHG

## 2021-11-18 DIAGNOSIS — K21.00 GASTROESOPHAGEAL REFLUX DISEASE WITH ESOPHAGITIS WITHOUT HEMORRHAGE: Primary | ICD-10-CM

## 2021-11-18 PROCEDURE — 99213 OFFICE O/P EST LOW 20 MIN: CPT | Performed by: NURSE PRACTITIONER

## 2021-11-18 RX ORDER — DEXLANSOPRAZOLE 60 MG/1
60 CAPSULE, DELAYED RELEASE ORAL DAILY
Qty: 90 CAPSULE | Refills: 1 | Status: SHIPPED | OUTPATIENT
Start: 2021-11-18 | End: 2022-05-18 | Stop reason: SDUPTHER

## 2021-11-18 RX ORDER — SIMETHICONE 80 MG
80 TABLET,CHEWABLE ORAL EVERY 6 HOURS PRN
Qty: 120 TABLET | Refills: 5 | Status: SHIPPED | OUTPATIENT
Start: 2021-11-18

## 2021-11-18 NOTE — PATIENT INSTRUCTIONS
"BMI for Adults  What is BMI?  Body mass index (BMI) is a number that is calculated from a person's weight and height. BMI can help estimate how much of a person's weight is composed of fat. BMI does not measure body fat directly. Rather, it is an alternative to procedures that directly measure body fat, which can be difficult and expensive.  BMI can help identify people who may be at higher risk for certain medical problems.  What are BMI measurements used for?  BMI is used as a screening tool to identify possible weight problems. It helps determine whether a person is obese, overweight, a healthy weight, or underweight.  BMI is useful for:  · Identifying a weight problem that may be related to a medical condition or may increase the risk for medical problems.  · Promoting changes, such as changes in diet and exercise, to help reach a healthy weight. BMI screening can be repeated to see if these changes are working.  How is BMI calculated?  BMI involves measuring your weight in relation to your height. Both height and weight are measured, and the BMI is calculated from those numbers. This can be done either in English ([place] or metric measurements. Note that charts and online BMI calculators are available to help you find your BMI quickly and easily without having to do these calculations yourself.  To calculate your BMI in English ([place] measurements:    1. Measure your weight in pounds (lb).  2. Multiply the number of pounds by 703.  ? For example, for a person who weighs 180 lb, multiply that number by 703, which equals 126,540.  3. Measure your height in inches. Then multiply that number by itself to get a measurement called \"inches squared.\"  ? For example, for a person who is 70 inches tall, the \"inches squared\" measurement is 70 inches x 70 inches, which equals 4,900 inches squared.  4. Divide the total from step 2 (number of lb x 703) by the total from step 3 (inches squared): 126,540 ÷ 4,900 = 25.8. This is " "your BMI.    To calculate your BMI in metric measurements:  1. Measure your weight in kilograms (kg).  2. Measure your height in meters (m). Then multiply that number by itself to get a measurement called \"meters squared.\"  ? For example, for a person who is 1.75 m tall, the \"meters squared\" measurement is 1.75 m x 1.75 m, which is equal to 3.1 meters squared.  3. Divide the number of kilograms (your weight) by the meters squared number. In this example: 70 ÷ 3.1 = 22.6. This is your BMI.  What do the results mean?  BMI charts are used to identify whether you are underweight, normal weight, overweight, or obese. The following guidelines will be used:  · Underweight: BMI less than 18.5.  · Normal weight: BMI between 18.5 and 24.9.  · Overweight: BMI between 25 and 29.9.  · Obese: BMI of 30 or above.  Keep these notes in mind:  · Weight includes both fat and muscle, so someone with a muscular build, such as an athlete, may have a BMI that is higher than 24.9. In cases like these, BMI is not an accurate measure of body fat.  · To determine if excess body fat is the cause of a BMI of 25 or higher, further assessments may need to be done by a health care provider.  · BMI is usually interpreted in the same way for men and women.  Where to find more information  For more information about BMI, including tools to quickly calculate your BMI, go to these websites:  · Centers for Disease Control and Prevention: www.cdc.gov  · American Heart Association: www.heart.org  · National Heart, Lung, and Blood [place]: www.nhlbi.nih.gov  Summary  · Body mass index (BMI) is a number that is calculated from a person's weight and height.  · BMI may help estimate how much of a person's weight is composed of fat. BMI can help identify those who may be at higher risk for certain medical problems.  · BMI can be measured using English measurements or metric measurements.  · BMI charts are used to identify whether you are underweight, normal " weight, overweight, or obese.  This information is not intended to replace advice given to you by your health care provider. Make sure you discuss any questions you have with your health care provider.  Document Revised: 09/09/2020 Document Reviewed: 07/17/2020  Elsevier Patient Education © 2021 [employer]
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
## 2021-11-30 ENCOUNTER — CLINICAL SUPPORT (OUTPATIENT)
Dept: FAMILY MEDICINE CLINIC | Facility: CLINIC | Age: 81
End: 2021-11-30

## 2021-11-30 DIAGNOSIS — Z23 NEED FOR VACCINATION: Primary | ICD-10-CM

## 2021-11-30 PROCEDURE — 96372 THER/PROPH/DIAG INJ SC/IM: CPT | Performed by: FAMILY MEDICINE

## 2021-11-30 PROCEDURE — 90732 PPSV23 VACC 2 YRS+ SUBQ/IM: CPT | Performed by: FAMILY MEDICINE

## 2021-11-30 PROCEDURE — G0009 ADMIN PNEUMOCOCCAL VACCINE: HCPCS | Performed by: FAMILY MEDICINE

## 2021-11-30 RX ADMIN — TESTOSTERONE CYPIONATE 200 MG: 200 INJECTION, SOLUTION INTRAMUSCULAR at 14:33

## 2021-12-02 RX ORDER — FUROSEMIDE 40 MG/1
40 TABLET ORAL DAILY
Qty: 30 TABLET | Refills: 5 | Status: SHIPPED | OUTPATIENT
Start: 2021-12-02 | End: 2022-05-31

## 2021-12-16 ENCOUNTER — CLINICAL SUPPORT (OUTPATIENT)
Dept: FAMILY MEDICINE CLINIC | Facility: CLINIC | Age: 81
End: 2021-12-16

## 2021-12-16 DIAGNOSIS — R79.89 LOW TESTOSTERONE: ICD-10-CM

## 2021-12-16 PROCEDURE — 96372 THER/PROPH/DIAG INJ SC/IM: CPT | Performed by: FAMILY MEDICINE

## 2021-12-16 RX ADMIN — TESTOSTERONE CYPIONATE 200 MG: 200 INJECTION, SOLUTION INTRAMUSCULAR at 13:17

## 2022-01-11 RX ORDER — POTASSIUM CHLORIDE 20 MEQ/1
20 TABLET, EXTENDED RELEASE ORAL DAILY
Qty: 90 TABLET | Refills: 5 | Status: SHIPPED | OUTPATIENT
Start: 2022-01-11 | End: 2023-03-02

## 2022-01-17 ENCOUNTER — CLINICAL SUPPORT (OUTPATIENT)
Dept: FAMILY MEDICINE CLINIC | Facility: CLINIC | Age: 82
End: 2022-01-17

## 2022-02-01 ENCOUNTER — CLINICAL SUPPORT (OUTPATIENT)
Dept: FAMILY MEDICINE CLINIC | Facility: CLINIC | Age: 82
End: 2022-02-01

## 2022-02-01 DIAGNOSIS — E34.9 TESTOSTERONE DEFICIENCY: ICD-10-CM

## 2022-02-01 PROCEDURE — 96372 THER/PROPH/DIAG INJ SC/IM: CPT | Performed by: FAMILY MEDICINE

## 2022-02-01 RX ADMIN — TESTOSTERONE CYPIONATE 200 MG: 200 INJECTION, SOLUTION INTRAMUSCULAR at 11:57

## 2022-02-17 ENCOUNTER — CLINICAL SUPPORT (OUTPATIENT)
Dept: FAMILY MEDICINE CLINIC | Facility: CLINIC | Age: 82
End: 2022-02-17

## 2022-02-17 DIAGNOSIS — E34.9 TESTOSTERONE DEFICIENCY: ICD-10-CM

## 2022-02-17 PROCEDURE — 96372 THER/PROPH/DIAG INJ SC/IM: CPT | Performed by: FAMILY MEDICINE

## 2022-02-17 RX ADMIN — TESTOSTERONE CYPIONATE 200 MG: 200 INJECTION, SOLUTION INTRAMUSCULAR at 13:35

## 2022-03-01 ENCOUNTER — CLINICAL SUPPORT (OUTPATIENT)
Dept: FAMILY MEDICINE CLINIC | Facility: CLINIC | Age: 82
End: 2022-03-01

## 2022-03-01 DIAGNOSIS — E34.9 TESTOSTERONE DEFICIENCY: ICD-10-CM

## 2022-03-01 PROCEDURE — 96372 THER/PROPH/DIAG INJ SC/IM: CPT | Performed by: FAMILY MEDICINE

## 2022-03-01 RX ADMIN — TESTOSTERONE CYPIONATE 200 MG: 200 INJECTION, SOLUTION INTRAMUSCULAR at 11:07

## 2022-03-02 DIAGNOSIS — E34.9 TESTOSTERONE DEFICIENCY: ICD-10-CM

## 2022-03-02 RX ORDER — TESTOSTERONE CYPIONATE 200 MG/ML
200 INJECTION, SOLUTION INTRAMUSCULAR
Qty: 2 ML | Refills: 0 | Status: SHIPPED | OUTPATIENT
Start: 2022-03-02 | End: 2022-04-05

## 2022-03-15 ENCOUNTER — CLINICAL SUPPORT (OUTPATIENT)
Dept: FAMILY MEDICINE CLINIC | Facility: CLINIC | Age: 82
End: 2022-03-15

## 2022-03-15 DIAGNOSIS — E34.9 TESTOSTERONE DEFICIENCY: ICD-10-CM

## 2022-03-15 PROCEDURE — 96372 THER/PROPH/DIAG INJ SC/IM: CPT | Performed by: FAMILY MEDICINE

## 2022-03-15 RX ADMIN — TESTOSTERONE CYPIONATE 200 MG: 200 INJECTION, SOLUTION INTRAMUSCULAR at 09:02

## 2022-04-05 ENCOUNTER — CLINICAL SUPPORT (OUTPATIENT)
Dept: FAMILY MEDICINE CLINIC | Facility: CLINIC | Age: 82
End: 2022-04-05

## 2022-04-05 DIAGNOSIS — E34.9 TESTOSTERONE DEFICIENCY: ICD-10-CM

## 2022-04-05 PROCEDURE — 96372 THER/PROPH/DIAG INJ SC/IM: CPT | Performed by: FAMILY MEDICINE

## 2022-04-05 RX ORDER — TESTOSTERONE CYPIONATE 200 MG/ML
200 INJECTION, SOLUTION INTRAMUSCULAR
Qty: 2 ML | Refills: 0 | Status: SHIPPED | OUTPATIENT
Start: 2022-04-05 | End: 2022-05-15

## 2022-04-05 RX ADMIN — TESTOSTERONE CYPIONATE 200 MG: 200 INJECTION, SOLUTION INTRAMUSCULAR at 11:04

## 2022-04-18 ENCOUNTER — CLINICAL SUPPORT (OUTPATIENT)
Dept: FAMILY MEDICINE CLINIC | Facility: CLINIC | Age: 82
End: 2022-04-18

## 2022-04-18 DIAGNOSIS — E34.9 TESTOSTERONE DEFICIENCY: ICD-10-CM

## 2022-04-18 PROCEDURE — 96372 THER/PROPH/DIAG INJ SC/IM: CPT | Performed by: FAMILY MEDICINE

## 2022-04-18 RX ADMIN — TESTOSTERONE CYPIONATE 200 MG: 200 INJECTION, SOLUTION INTRAMUSCULAR at 13:47

## 2022-05-13 DIAGNOSIS — E34.9 TESTOSTERONE DEFICIENCY: ICD-10-CM

## 2022-05-15 RX ORDER — TESTOSTERONE CYPIONATE 200 MG/ML
200 INJECTION, SOLUTION INTRAMUSCULAR
Qty: 2 ML | Refills: 0 | Status: SHIPPED | OUTPATIENT
Start: 2022-05-15 | End: 2022-06-16

## 2022-05-17 ENCOUNTER — CLINICAL SUPPORT (OUTPATIENT)
Dept: FAMILY MEDICINE CLINIC | Facility: CLINIC | Age: 82
End: 2022-05-17

## 2022-05-17 DIAGNOSIS — E34.9 TESTOSTERONE DEFICIENCY: ICD-10-CM

## 2022-05-17 PROCEDURE — 96372 THER/PROPH/DIAG INJ SC/IM: CPT | Performed by: FAMILY MEDICINE

## 2022-05-17 RX ADMIN — TESTOSTERONE CYPIONATE 200 MG: 200 INJECTION, SOLUTION INTRAMUSCULAR at 10:57

## 2022-05-18 ENCOUNTER — OFFICE VISIT (OUTPATIENT)
Dept: GASTROENTEROLOGY | Facility: CLINIC | Age: 82
End: 2022-05-18

## 2022-05-18 VITALS
SYSTOLIC BLOOD PRESSURE: 122 MMHG | HEART RATE: 63 BPM | HEIGHT: 70 IN | WEIGHT: 193 LBS | DIASTOLIC BLOOD PRESSURE: 65 MMHG | BODY MASS INDEX: 27.63 KG/M2

## 2022-05-18 DIAGNOSIS — K21.00 GASTROESOPHAGEAL REFLUX DISEASE WITH ESOPHAGITIS WITHOUT HEMORRHAGE: Primary | ICD-10-CM

## 2022-05-18 PROBLEM — H91.90 HEARING LOSS: Status: ACTIVE | Noted: 2021-06-28

## 2022-05-18 PROBLEM — R07.2 PRECORDIAL PAIN: Status: ACTIVE | Noted: 2018-06-11

## 2022-05-18 PROCEDURE — 99213 OFFICE O/P EST LOW 20 MIN: CPT | Performed by: NURSE PRACTITIONER

## 2022-05-18 RX ORDER — DEXLANSOPRAZOLE 60 MG/1
60 CAPSULE, DELAYED RELEASE ORAL DAILY
Qty: 90 CAPSULE | Refills: 1 | Status: SHIPPED | OUTPATIENT
Start: 2022-05-18 | End: 2022-10-10

## 2022-05-18 NOTE — PROGRESS NOTES
Chief Complaint   Patient presents with   • Heartburn       Subjective    Dale Mann is a 81 y.o. male. he is here today for follow-up.  81-year-old male presents for 6-month recheck regarding GERD.  States symptoms of been well controlled Dexilant daily has not had any issues recently denies any nausea vomiting abdominal pain.  Denies any nocturnal reflux.  His weight is stable    Heartburn  He complains of belching. He reports no abdominal pain, no chest pain, no choking, no coughing, no dysphagia, no early satiety, no globus sensation, no heartburn, no hoarse voice, no nausea or no sore throat. This is a chronic problem. Pertinent negatives include no fatigue.            The following portions of the patient's history were reviewed and updated as appropriate:   Past Medical History:   Diagnosis Date   • Acid reflux    • Arthritis    • Chronic anemia    • Chronic gastritis    • Coronary arteriosclerosis     hx of CABG then stent placement,  is followed by dr pickard in [place]   • Coronary artery disease of bypass graft of native heart with stable angina pectoris (HCC) 4/11/2019   • Diverticulosis of sigmoid colon    • Dysuria    • Essential hypertension    • Fever    • Hematochezia    • High cholesterol    • History of peptic ulcer    • Teller (hard of hearing)     wearing heading aids david   • Hyperkalemia    • Macular degeneration     left eye only   • Old myocardial infarction    • Other specified anemias    • Screening for malignant neoplasm of prostate    • Upper respiratory infection    • Urinary tract infectious disease      Past Surgical History:   Procedure Laterality Date   • 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 A single polyp was found in the sigmoid colon. Removed by cold biopsy polypectomy.  Internal and external hemorrhoids found.   • COLONOSCOPY N/A 5/16/2018    Procedure: COLONOSCOPY;  Surgeon: Ricardo Chamberlain MD;  Location: [facility] ENDOSCOPY;  Service: Gastroenterology   • CORONARY ANGIOPLASTY  2007   • CORONARY ARTERY BYPASS GRAFT     • ENDOSCOPY N/A 5/16/2018    Procedure: ESOPHAGOGASTRODUODENOSCOPY;  Surgeon: Ricardo Chamberlain MD;  Location: [facility] ENDOSCOPY;  Service: Gastroenterology   • INGUINAL HERNIA REPAIR Left 12/8/2017    Procedure: OPEN LEFT INGUINAL HERNIA REPAIR WITH MESH;  Surgeon: Dale Diehl MD;  Location: [facility] OR;  Service:    • PHALLOPLASTY, CIRCUMCISION N/A 5/24/2017    Procedure: CIRCUMCISION;  Surgeon: Dale Lezama MD;  Location: [facility] OR;  Service:    • UPPER GASTROINTESTINAL ENDOSCOPY  01/14/2015    Normal esophagus. Gastritis was found in the stomach. Biopsy taken. Duodenitis was found in the duodenum. Biopsy taken.     Family History   Problem Relation Age of Onset   • Colon polyps Other    • Heart disease Other        Prior to Admission medications    Medication Sig Start Date End Date Taking? Authorizing Provider   alprostadil (MUSE) 125 MCG pellet 1 each by Transurethral route As Needed for Erectile Dysfunction. use no more than 3 times per week 6/22/21 6/22/22 Yes Dunia Cummings MD   aspirin 81 MG EC tablet Take 81 mg by mouth Daily. Last dose 11/27/17   Yes Edgar Parada MD   carvedilol (COREG) 6.25 MG tablet Take 6.25 mg by mouth 2 (two) times a day. 10/13/20  Yes Edgar Parada MD   dexlansoprazole (Dexilant) 60 MG capsule Take 1 capsule by mouth Daily. 11/18/21  Yes Toña Wesley APRN   furosemide (LASIX) 40 MG tablet TAKE 1 TABLET BY MOUTH DAILY. 12/2/21  Yes Dunia Cummings MD   isosorbide mononitrate (IMDUR) 60 MG 24 hr tablet TAKE 1 TABLET BY MOUTH DAILY. 3/3/21  Yes Dunia Cummings MD   metoprolol tartrate (LOPRESSOR) 50 MG tablet TAKE ONE TABLET BY MOUTH EVERY DAY REPLACES ATENOLOL 4/11/19  Yes Dunia Cummings MD  "  nitroglycerin (NITROSTAT) 0.4 MG SL tablet PLACE 1 TABLET UNDER THE TONGUE EVERY 5 (FIVE) MINUTES AS NEEDED FOR CHEST PAIN. TAKE NO MORE THAN 3 DOSES IN 15 MINUTES. 12/29/20  Yes Dunia Cummings MD   polyethyl glycol-propyl glycol (SYSTANE) 0.4-0.3 % solution ophthalmic solution Administer 2 drops to both eyes Every 1 (One) Hour As Needed.   Yes Edgar Parada MD   potassium chloride (K-DUR,KLOR-CON) 20 MEQ CR tablet TAKE 1 TABLET BY MOUTH DAILY. 1/11/22  Yes Dunia Cummings MD   prasugrel (EFFIENT) 10 MG tablet TAKE 1 TABLET BY MOUTH DAILY. 10/3/19  Yes Dunia Cummings MD   ranolazine (RANEXA) 1000 MG 12 hr tablet TAKE ONE TABLET BY MOUTH TWO TIMES A DAY 5/11/21  Yes Dunia Cummings MD   simethicone (Gas-X) 80 MG chewable tablet Chew 1 tablet Every 6 (Six) Hours As Needed for Flatulence. 11/18/21  Yes Toña Wesley APRN   simvastatin (ZOCOR) 80 MG tablet 1 TABLET(S) BY MOUTH AT BEDTIME 4/29/19  Yes Dunia Cummings MD   tamsulosin (FLOMAX) 0.4 MG capsule 24 hr capsule Take 1 capsule by mouth Every Night. 2/27/19  Yes Edgar Parada MD   Testosterone Cypionate (DEPOTESTOTERONE CYPIONATE) 200 MG/ML injection INJECT 1 ML INTO THE APPROPRIATE MUSCLE AS DIRECTED BY PRESCRIBER EVERY 14 (FOURTEEN) DAYS. 5/15/22  Yes Dunia Cummings MD   Xarelto 2.5 MG tablet Take 2.5 mg by mouth Daily. 10/13/20  Yes Edgar Parada MD     Allergies   Allergen Reactions   • Norvasc [Amlodipine Besylate] Anaphylaxis   • Ticagrelor Anaphylaxis   • Coumadin [Warfarin Sodium] Other (See Comments)     Excessive bleeding   • Lipitor [Atorvastatin] Other (See Comments)     \"affected movement of heart\"   • Clopidogrel Rash     Other reaction(s): rash   • Enalapril Rash   • Plavix [Clopidogrel Bisulfate] Rash     Social History     Socioeconomic History   • Marital status:    Tobacco Use   • Smoking status: Former Smoker     Packs/day: 3.00     Years: 25.00     Pack years: 75.00     Types: Cigarettes     Quit " "date:      Years since quittin.4   • Smokeless tobacco: Never Used   Vaping Use   • Vaping Use: Never used   Substance and Sexual Activity   • Alcohol use: Yes     Comment: 2021 - \"Socially\".  \"Preferrably Wine\".   • Drug use: No   • Sexual activity: Defer       Review of Systems  Review of Systems   Constitutional: Negative for activity change, appetite change, chills, diaphoresis, fatigue, fever and unexpected weight change.   HENT: Negative for hoarse voice, sore throat and trouble swallowing.    Respiratory: Negative for cough, choking and shortness of breath.    Cardiovascular: Negative for chest pain.   Gastrointestinal: Negative for abdominal distention, abdominal pain, anal bleeding, blood in stool, constipation, diarrhea, dysphagia, heartburn, nausea, rectal pain and vomiting.   Musculoskeletal: Negative for arthralgias.   Skin: Negative for pallor.   Neurological: Negative for light-headedness.        /65 (BP Location: Left arm)   Pulse 63   Ht 177.8 cm (70\")   Wt 87.5 kg (193 lb)   BMI 27.69 kg/m²     Objective    Physical Exam  Constitutional:       General: He is not in acute distress.     Appearance: Normal appearance. He is normal weight. He is not ill-appearing.   HENT:      Head: Normocephalic and atraumatic.   Pulmonary:      Effort: Pulmonary effort is normal.   Abdominal:      General: Abdomen is flat. Bowel sounds are normal. There is no distension.      Palpations: Abdomen is soft. There is no mass.      Tenderness: There is no abdominal tenderness.   Neurological:      Mental Status: He is alert.       Lab on 2021   Component Date Value Ref Range Status   • Total Cholesterol 2021 111 (A) 150 - 200 mg/dL Final   • Triglycerides 2021 92  <=150 mg/dL Final   • HDL Cholesterol 2021 33 (A) 40 - 59 mg/dL Final   • LDL Cholesterol  2021 60  <=100 mg/dL Final   • VLDL Cholesterol 2021 18  5 - 40 mg/dL Final   • LDL/HDL Ratio 2021 1.81  " 0.00 - 3.55 Final   • WBC 07/20/2021 5.74  3.40 - 10.80 10*3/mm3 Final   • RBC 07/20/2021 4.57  4.14 - 5.80 10*6/mm3 Final   • Hemoglobin 07/20/2021 14.5  13.0 - 17.7 g/dL Final   • Hematocrit 07/20/2021 42.6  37.5 - 51.0 % Final   • MCV 07/20/2021 93.2  79.0 - 97.0 fL Final   • MCH 07/20/2021 31.7  26.6 - 33.0 pg Final   • MCHC 07/20/2021 34.0  31.5 - 35.7 g/dL Final   • RDW 07/20/2021 17.5 (A) 12.3 - 15.4 % Final   • RDW-SD 07/20/2021 57.5 (A) 37.0 - 54.0 fl Final   • MPV 07/20/2021 8.6  6.0 - 12.0 fL Final   • Platelets 07/20/2021 141  140 - 450 10*3/mm3 Final   • Neutrophil % 07/20/2021 70.0  42.7 - 76.0 % Final   • Lymphocyte % 07/20/2021 18.0 (A) 19.6 - 45.3 % Final   • Monocyte % 07/20/2021 8.0  5.0 - 12.0 % Final   • Eosinophil % 07/20/2021 3.0  0.3 - 6.2 % Final   • Basophil % 07/20/2021 1.0  0.0 - 1.5 % Final   • Neutrophils Absolute 07/20/2021 4.02  1.70 - 7.00 10*3/mm3 Final   • Lymphocytes Absolute 07/20/2021 1.03  0.70 - 3.10 10*3/mm3 Final   • Monocytes Absolute 07/20/2021 0.46  0.10 - 0.90 10*3/mm3 Final   • Eosinophils Absolute 07/20/2021 0.17  0.00 - 0.40 10*3/mm3 Final   • Basophils Absolute 07/20/2021 0.06  0.00 - 0.20 10*3/mm3 Final   • RBC Morphology 07/20/2021 Normal  Normal Final   • WBC Morphology 07/20/2021 Normal  Normal Final   • Platelet Estimate 07/20/2021 Adequate  Normal Final     Assessment & Plan      1. Gastroesophageal reflux disease with esophagitis without hemorrhage    .   Continue PPI daily avoid gastric irritants follow standard antireflux measures.  Simethicone as needed.  Follow-up in 6 months for recheck colonoscopy due 2023 for surveillance    Orders placed during this encounter include:  Orders Placed This Encounter   Procedures   • Comprehensive Metabolic Panel     Order Specific Question:   Release to patient     Answer:   Immediate       * Surgery not found *    Review and/or summary of lab tests, radiology, procedures, medications. Review and summary of old records  and obtaining of history. The risks and benefits of my recommendations, as well as other treatment options were discussed with the patient today. Questions were answered.    New Medications Ordered This Visit   Medications   • dexlansoprazole (Dexilant) 60 MG capsule     Sig: Take 1 capsule by mouth Daily.     Dispense:  90 capsule     Refill:  1       Follow-up: Return in about 6 months (around 11/18/2022) for Recheck.          This document has been electronically signed by ZENAIDA Montero on May 18, 2022 13:25 CDT           I spent 19 minutes caring for Dale on this date of service. This time includes time spent by me in the following activities:preparing for the visit, reviewing tests, obtaining and/or reviewing a separately obtained history, performing a medically appropriate examination and/or evaluation , counseling and educating the patient/family/caregiver, ordering medications, tests, or procedures, referring and communicating with other health care professionals , documenting information in the medical record and care coordination    Results for orders placed or performed in visit on 07/20/21   CBC Auto Differential    Specimen: Blood   Result Value Ref Range    WBC 5.74 3.40 - 10.80 10*3/mm3    RBC 4.57 4.14 - 5.80 10*6/mm3    Hemoglobin 14.5 13.0 - 17.7 g/dL    Hematocrit 42.6 37.5 - 51.0 %    MCV 93.2 79.0 - 97.0 fL    MCH 31.7 26.6 - 33.0 pg    MCHC 34.0 31.5 - 35.7 g/dL    RDW 17.5 (H) 12.3 - 15.4 %    RDW-SD 57.5 (H) 37.0 - 54.0 fl    MPV 8.6 6.0 - 12.0 fL    Platelets 141 140 - 450 10*3/mm3   Manual Differential    Specimen: Blood   Result Value Ref Range    Neutrophil % 70.0 42.7 - 76.0 %    Lymphocyte % 18.0 (L) 19.6 - 45.3 %    Monocyte % 8.0 5.0 - 12.0 %    Eosinophil % 3.0 0.3 - 6.2 %    Basophil % 1.0 0.0 - 1.5 %    Neutrophils Absolute 4.02 1.70 - 7.00 10*3/mm3    Lymphocytes Absolute 1.03 0.70 - 3.10 10*3/mm3    Monocytes Absolute 0.46 0.10 - 0.90 10*3/mm3    Eosinophils Absolute 0.17  0.00 - 0.40 10*3/mm3    Basophils Absolute 0.06 0.00 - 0.20 10*3/mm3    RBC Morphology Normal Normal    WBC Morphology Normal Normal    Platelet Estimate Adequate Normal   Lipid Panel    Specimen: Blood   Result Value Ref Range    Total Cholesterol 111 (L) 150 - 200 mg/dL    Triglycerides 92 <=150 mg/dL    HDL Cholesterol 33 (L) 40 - 59 mg/dL    LDL Cholesterol  60 <=100 mg/dL    VLDL Cholesterol 18 5 - 40 mg/dL    LDL/HDL Ratio 1.81 0.00 - 3.55   Results for orders placed or performed in visit on 06/22/21   Testosterone, Free, Total    Specimen: Blood   Result Value Ref Range    Testosterone, Total >1500 (H) 264 - 916 ng/dL    Testosterone, Free 25.2 (H) 6.6 - 18.1 pg/mL   TSH    Specimen: Blood   Result Value Ref Range    TSH 2.030 0.270 - 4.200 uIU/mL   T4, Free    Specimen: Blood   Result Value Ref Range    Free T4 1.04 0.93 - 1.70 ng/dL   Comprehensive Metabolic Panel    Specimen: Blood   Result Value Ref Range    Glucose 100 (H) 70 - 99 mg/dL    BUN 21 7 - 23 mg/dL    Creatinine 1.14 0.70 - 1.30 mg/dL    Sodium 138 137 - 145 mmol/L    Potassium 4.7 3.4 - 5.0 mmol/L    Chloride 103 101 - 112 mmol/L    CO2 30.0 22.0 - 30.0 mmol/L    Calcium 9.1 8.4 - 10.2 mg/dL    Total Protein 7.9 6.3 - 8.6 g/dL    Albumin 4.00 3.50 - 5.00 g/dL    ALT (SGPT) 13 <=50 U/L    AST (SGOT) 22 17 - 59 U/L    Alkaline Phosphatase 139 (H) 38 - 126 U/L    Total Bilirubin 1.0 0.2 - 1.3 mg/dL    eGFR Non African Amer 62 42 - 98 mL/min/1.73    Globulin 3.9 (H) 2.3 - 3.5 gm/dL    A/G Ratio 1.0 (L) 1.1 - 1.8 g/dL    BUN/Creatinine Ratio 18.4 7.0 - 25.0    Anion Gap 5.0 5.0 - 15.0 mmol/L   Results for orders placed or performed in visit on 07/15/20   CBC Auto Differential    Specimen: Blood   Result Value Ref Range    WBC 4.04 3.40 - 10.80 10*3/mm3    RBC 3.99 (L) 4.14 - 5.80 10*6/mm3    Hemoglobin 13.2 13.0 - 17.7 g/dL    Hematocrit 39.9 37.5 - 51.0 %    .0 (H) 79.0 - 97.0 fL    MCH 33.1 (H) 26.6 - 33.0 pg    MCHC 33.1 31.5 - 35.7 g/dL     RDW 15.4 12.3 - 15.4 %    RDW-SD 54.9 (H) 37.0 - 54.0 fl    MPV 8.4 6.0 - 12.0 fL    Platelets 151 140 - 450 10*3/mm3    Neutrophil % 54.2 42.7 - 76.0 %    Lymphocyte % 30.7 19.6 - 45.3 %    Monocyte % 9.4 5.0 - 12.0 %    Eosinophil % 5.2 0.3 - 6.2 %    Basophil % 0.5 0.0 - 1.5 %    Neutrophils, Absolute 2.19 1.70 - 7.00 10*3/mm3    Lymphocytes, Absolute 1.24 0.70 - 3.10 10*3/mm3    Monocytes, Absolute 0.38 0.10 - 0.90 10*3/mm3    Eosinophils, Absolute 0.21 0.00 - 0.40 10*3/mm3    Basophils, Absolute 0.02 0.00 - 0.20 10*3/mm3   Lipid Panel    Specimen: Blood   Result Value Ref Range    Total Cholesterol 117 (L) 150 - 200 mg/dL    Triglycerides 131 <=150 mg/dL    HDL Cholesterol 41 40 - 59 mg/dL    LDL Cholesterol  50 <=100 mg/dL    VLDL Cholesterol 26.2 mg/dL    LDL/HDL Ratio 1.21 0.00 - 3.55   Results for orders placed or performed in visit on 06/18/20   Comprehensive Metabolic Panel    Specimen: Blood   Result Value Ref Range    Glucose 102 (H) 70 - 99 mg/dL    BUN 25 (H) 7 - 23 mg/dL    Creatinine 1.08 0.70 - 1.30 mg/dL    Sodium 138 137 - 145 mmol/L    Potassium 4.2 3.4 - 5.0 mmol/L    Chloride 103 101 - 112 mmol/L    CO2 30.0 22.0 - 30.0 mmol/L    Calcium 9.2 8.4 - 10.2 mg/dL    Total Protein 7.0 6.3 - 8.6 g/dL    Albumin 3.60 3.50 - 5.00 g/dL    ALT (SGPT) 9 <=50 U/L    AST (SGOT) 18 17 - 59 U/L    Alkaline Phosphatase 101 38 - 126 U/L    Total Bilirubin 0.9 0.2 - 1.3 mg/dL    eGFR Non African Amer 66 42 - 98 mL/min/1.73    Globulin 3.4 2.3 - 3.5 gm/dL    A/G Ratio 1.1 1.1 - 1.8 g/dL    BUN/Creatinine Ratio 23.1 7.0 - 25.0    Anion Gap 5.0 5.0 - 15.0 mmol/L   Results for orders placed or performed in visit on 04/12/19   Testosterone, Free, Total    Specimen: Blood   Result Value Ref Range    Testosterone, Total 517 264 - 916 ng/dL    Testosterone, Free 7.7 6.6 - 18.1 pg/mL     *Note: Due to a large number of results and/or encounters for the requested time period, some results have not been displayed. A  complete set of results can be found in Results Review.

## 2022-05-31 ENCOUNTER — CLINICAL SUPPORT (OUTPATIENT)
Dept: FAMILY MEDICINE CLINIC | Facility: CLINIC | Age: 82
End: 2022-05-31

## 2022-05-31 DIAGNOSIS — E34.9 TESTOSTERONE DEFICIENCY: ICD-10-CM

## 2022-05-31 PROCEDURE — 96372 THER/PROPH/DIAG INJ SC/IM: CPT | Performed by: FAMILY MEDICINE

## 2022-05-31 RX ORDER — FUROSEMIDE 40 MG/1
40 TABLET ORAL DAILY
Qty: 30 TABLET | Refills: 5 | Status: SHIPPED | OUTPATIENT
Start: 2022-05-31 | End: 2022-11-28

## 2022-05-31 RX ADMIN — TESTOSTERONE CYPIONATE 200 MG: 200 INJECTION, SOLUTION INTRAMUSCULAR at 10:18

## 2022-06-16 ENCOUNTER — CLINICAL SUPPORT (OUTPATIENT)
Dept: FAMILY MEDICINE CLINIC | Facility: CLINIC | Age: 82
End: 2022-06-16

## 2022-06-16 DIAGNOSIS — E34.9 TESTOSTERONE DEFICIENCY: ICD-10-CM

## 2022-06-16 PROCEDURE — 96372 THER/PROPH/DIAG INJ SC/IM: CPT | Performed by: FAMILY MEDICINE

## 2022-06-16 RX ORDER — NITROGLYCERIN 0.4 MG/1
0.4 TABLET SUBLINGUAL
Qty: 25 TABLET | Refills: 0 | Status: SHIPPED | OUTPATIENT
Start: 2022-06-16 | End: 2022-11-28

## 2022-06-16 RX ORDER — TESTOSTERONE CYPIONATE 200 MG/ML
200 INJECTION, SOLUTION INTRAMUSCULAR
Qty: 2 ML | Refills: 0 | Status: SHIPPED | OUTPATIENT
Start: 2022-06-16 | End: 2022-07-27

## 2022-06-16 RX ADMIN — TESTOSTERONE CYPIONATE 200 MG: 200 INJECTION, SOLUTION INTRAMUSCULAR at 14:10

## 2022-06-29 ENCOUNTER — CLINICAL SUPPORT (OUTPATIENT)
Dept: FAMILY MEDICINE CLINIC | Facility: CLINIC | Age: 82
End: 2022-06-29

## 2022-06-29 DIAGNOSIS — E34.9 TESTOSTERONE DEFICIENCY: ICD-10-CM

## 2022-06-29 PROCEDURE — 96372 THER/PROPH/DIAG INJ SC/IM: CPT | Performed by: FAMILY MEDICINE

## 2022-06-29 RX ADMIN — TESTOSTERONE CYPIONATE 200 MG: 200 INJECTION, SOLUTION INTRAMUSCULAR at 13:51

## 2022-07-14 ENCOUNTER — CLINICAL SUPPORT (OUTPATIENT)
Dept: FAMILY MEDICINE CLINIC | Facility: CLINIC | Age: 82
End: 2022-07-14

## 2022-07-14 DIAGNOSIS — E34.9 TESTOSTERONE DEFICIENCY: ICD-10-CM

## 2022-07-14 DIAGNOSIS — E34.9 TESTOSTERONE DEFICIENCY: Primary | ICD-10-CM

## 2022-07-14 DIAGNOSIS — I10 ESSENTIAL HYPERTENSION: ICD-10-CM

## 2022-07-14 PROCEDURE — 96372 THER/PROPH/DIAG INJ SC/IM: CPT | Performed by: FAMILY MEDICINE

## 2022-07-14 RX ORDER — TESTOSTERONE CYPIONATE 200 MG/ML
200 INJECTION, SOLUTION INTRAMUSCULAR
Qty: 2 ML | Refills: 0 | OUTPATIENT
Start: 2022-07-14

## 2022-07-14 RX ADMIN — TESTOSTERONE CYPIONATE 200 MG: 200 INJECTION, SOLUTION INTRAMUSCULAR at 10:44

## 2022-07-18 ENCOUNTER — LAB (OUTPATIENT)
Dept: LAB | Facility: OTHER | Age: 82
End: 2022-07-18

## 2022-07-18 DIAGNOSIS — I10 ESSENTIAL HYPERTENSION: ICD-10-CM

## 2022-07-18 DIAGNOSIS — E34.9 TESTOSTERONE DEFICIENCY: ICD-10-CM

## 2022-07-18 LAB
ALBUMIN SERPL-MCNC: 4 G/DL (ref 3.5–5)
ALBUMIN/GLOB SERPL: 1.3 G/DL (ref 1.1–1.8)
ALP SERPL-CCNC: 88 U/L (ref 38–126)
ALT SERPL W P-5'-P-CCNC: 13 U/L
ANION GAP SERPL CALCULATED.3IONS-SCNC: 6 MMOL/L (ref 5–15)
AST SERPL-CCNC: 20 U/L (ref 17–59)
BASOPHILS # BLD AUTO: 0.02 10*3/MM3 (ref 0–0.2)
BASOPHILS NFR BLD AUTO: 0.3 % (ref 0–1.5)
BILIRUB SERPL-MCNC: 0.8 MG/DL (ref 0.2–1.3)
BILIRUB UR QL STRIP: NEGATIVE
BUN SERPL-MCNC: 20 MG/DL (ref 7–23)
BUN/CREAT SERPL: 17.4 (ref 7–25)
CALCIUM SPEC-SCNC: 9.3 MG/DL (ref 8.4–10.2)
CHLORIDE SERPL-SCNC: 103 MMOL/L (ref 101–112)
CHOLEST SERPL-MCNC: 106 MG/DL (ref 150–200)
CLARITY UR: ABNORMAL
CO2 SERPL-SCNC: 30 MMOL/L (ref 22–30)
COLOR UR: ABNORMAL
CREAT SERPL-MCNC: 1.15 MG/DL (ref 0.7–1.3)
DEPRECATED RDW RBC AUTO: 53.9 FL (ref 37–54)
EGFRCR SERPLBLD CKD-EPI 2021: 64.7 ML/MIN/1.73
EOSINOPHIL # BLD AUTO: 0.18 10*3/MM3 (ref 0–0.4)
EOSINOPHIL NFR BLD AUTO: 2.6 % (ref 0.3–6.2)
ERYTHROCYTE [DISTWIDTH] IN BLOOD BY AUTOMATED COUNT: 16 % (ref 12.3–15.4)
GLOBULIN UR ELPH-MCNC: 3.2 GM/DL (ref 2.3–3.5)
GLUCOSE SERPL-MCNC: 101 MG/DL (ref 70–99)
GLUCOSE UR STRIP-MCNC: NEGATIVE MG/DL
HCT VFR BLD AUTO: 43.3 % (ref 37.5–51)
HDLC SERPL-MCNC: 47 MG/DL (ref 40–59)
HGB BLD-MCNC: 14.1 G/DL (ref 13–17.7)
HGB UR QL STRIP.AUTO: NEGATIVE
KETONES UR QL STRIP: NEGATIVE
LDLC SERPL CALC-MCNC: 41 MG/DL
LDLC/HDLC SERPL: 0.86 {RATIO} (ref 0–3.55)
LEUKOCYTE ESTERASE UR QL STRIP.AUTO: NEGATIVE
LYMPHOCYTES # BLD AUTO: 1.21 10*3/MM3 (ref 0.7–3.1)
LYMPHOCYTES NFR BLD AUTO: 17.4 % (ref 19.6–45.3)
MCH RBC QN AUTO: 31 PG (ref 26.6–33)
MCHC RBC AUTO-ENTMCNC: 32.6 G/DL (ref 31.5–35.7)
MCV RBC AUTO: 95.2 FL (ref 79–97)
MONOCYTES # BLD AUTO: 0.59 10*3/MM3 (ref 0.1–0.9)
MONOCYTES NFR BLD AUTO: 8.5 % (ref 5–12)
NEUTROPHILS NFR BLD AUTO: 4.94 10*3/MM3 (ref 1.7–7)
NEUTROPHILS NFR BLD AUTO: 71.2 % (ref 42.7–76)
NITRITE UR QL STRIP: NEGATIVE
PH UR STRIP.AUTO: <=5 [PH] (ref 5.5–8)
PLATELET # BLD AUTO: 160 10*3/MM3 (ref 140–450)
PMV BLD AUTO: 8.5 FL (ref 6–12)
POTASSIUM SERPL-SCNC: 4.3 MMOL/L (ref 3.4–5)
PROT SERPL-MCNC: 7.2 G/DL (ref 6.3–8.6)
PROT UR QL STRIP: NEGATIVE
RBC # BLD AUTO: 4.55 10*6/MM3 (ref 4.14–5.8)
SODIUM SERPL-SCNC: 139 MMOL/L (ref 137–145)
SP GR UR STRIP: 1.02 (ref 1–1.03)
T4 FREE SERPL-MCNC: 1.07 NG/DL (ref 0.93–1.7)
TRIGL SERPL-MCNC: 94 MG/DL
TSH SERPL DL<=0.05 MIU/L-ACNC: 1.94 UIU/ML (ref 0.27–4.2)
UROBILINOGEN UR QL STRIP: ABNORMAL
VLDLC SERPL-MCNC: 18 MG/DL (ref 5–40)
WBC NRBC COR # BLD: 6.94 10*3/MM3 (ref 3.4–10.8)

## 2022-07-18 PROCEDURE — 80053 COMPREHEN METABOLIC PANEL: CPT | Performed by: NURSE PRACTITIONER

## 2022-07-18 PROCEDURE — 84403 ASSAY OF TOTAL TESTOSTERONE: CPT | Performed by: FAMILY MEDICINE

## 2022-07-18 PROCEDURE — 81003 URINALYSIS AUTO W/O SCOPE: CPT | Performed by: FAMILY MEDICINE

## 2022-07-18 PROCEDURE — 84402 ASSAY OF FREE TESTOSTERONE: CPT | Performed by: FAMILY MEDICINE

## 2022-07-18 PROCEDURE — 85025 COMPLETE CBC W/AUTO DIFF WBC: CPT | Performed by: FAMILY MEDICINE

## 2022-07-18 PROCEDURE — 84439 ASSAY OF FREE THYROXINE: CPT | Performed by: FAMILY MEDICINE

## 2022-07-18 PROCEDURE — 84443 ASSAY THYROID STIM HORMONE: CPT | Performed by: FAMILY MEDICINE

## 2022-07-18 PROCEDURE — 36415 COLL VENOUS BLD VENIPUNCTURE: CPT | Performed by: NURSE PRACTITIONER

## 2022-07-18 PROCEDURE — 80061 LIPID PANEL: CPT | Performed by: FAMILY MEDICINE

## 2022-07-22 LAB
TESTOST FREE SERPL-MCNC: 29.8 PG/ML (ref 6.6–18.1)
TESTOST SERPL-MCNC: >1500 NG/DL (ref 264–916)

## 2022-07-27 ENCOUNTER — TELEPHONE (OUTPATIENT)
Dept: FAMILY MEDICINE CLINIC | Facility: CLINIC | Age: 82
End: 2022-07-27

## 2022-07-27 DIAGNOSIS — E34.9 TESTOSTERONE DEFICIENCY: ICD-10-CM

## 2022-07-27 RX ORDER — ATORVASTATIN CALCIUM 40 MG/1
TABLET, FILM COATED ORAL
Qty: 30 TABLET | Refills: 3 | Status: SHIPPED | OUTPATIENT
Start: 2022-07-27 | End: 2023-02-09 | Stop reason: SDUPTHER

## 2022-07-27 RX ORDER — TESTOSTERONE CYPIONATE 200 MG/ML
200 INJECTION, SOLUTION INTRAMUSCULAR
Qty: 2 ML | Refills: 0 | Status: SHIPPED | OUTPATIENT
Start: 2022-07-27 | End: 2022-08-28

## 2022-08-10 ENCOUNTER — OFFICE VISIT (OUTPATIENT)
Dept: FAMILY MEDICINE CLINIC | Facility: CLINIC | Age: 82
End: 2022-08-10

## 2022-08-10 VITALS
HEART RATE: 74 BPM | WEIGHT: 193 LBS | DIASTOLIC BLOOD PRESSURE: 80 MMHG | TEMPERATURE: 98.1 F | SYSTOLIC BLOOD PRESSURE: 140 MMHG | OXYGEN SATURATION: 96 % | HEIGHT: 70 IN | BODY MASS INDEX: 27.63 KG/M2

## 2022-08-10 DIAGNOSIS — Z95.5 CORONARY STENT PATENT: ICD-10-CM

## 2022-08-10 DIAGNOSIS — I10 ESSENTIAL HYPERTENSION: ICD-10-CM

## 2022-08-10 DIAGNOSIS — E34.9 TESTOSTERONE DEFICIENCY: ICD-10-CM

## 2022-08-10 DIAGNOSIS — I25.708 CORONARY ARTERY DISEASE OF BYPASS GRAFT OF NATIVE HEART WITH STABLE ANGINA PECTORIS: Primary | ICD-10-CM

## 2022-08-10 PROCEDURE — 99214 OFFICE O/P EST MOD 30 MIN: CPT | Performed by: FAMILY MEDICINE

## 2022-08-10 RX ORDER — PRASUGREL 10 MG/1
10 TABLET, FILM COATED ORAL DAILY
Status: SHIPPED
Start: 2022-08-10

## 2022-08-10 RX ORDER — ALBUTEROL SULFATE 90 UG/1
AEROSOL, METERED RESPIRATORY (INHALATION)
COMMUNITY
Start: 2022-08-03

## 2022-08-10 RX ORDER — PRASUGREL 5 MG/1
5 TABLET, FILM COATED ORAL DAILY
COMMUNITY
End: 2022-08-10

## 2022-08-10 NOTE — PROGRESS NOTES
Subjective   Dale Mann is a 81 y.o. male.   Patient with coronary disease, low testosterone, hypertension here today for follow-up after recent coronary stenting last Friday.  He had some medication changes made, namely changing Xarelto to Effient.  I have the discharge summary from his hospitalization and we made a copy of the card for his stent which was put into the left main coronary artery.  He had some initial chest pain after but is feeling better now.  I do have concerns that the patient lives by himself but he says that he is making it just fine and does not feel he needs any help.  History of Present Illness    The following portions of the patient's history were reviewed and updated as appropriate: allergies, current medications, past family history, past medical history, past social history, past surgical history and problem list.    Review of Systems   Constitutional: Negative for activity change, appetite change, diaphoresis, fatigue, fever and unexpected weight change.   HENT: Negative for congestion, ear pain, hearing loss, sinus pressure, sore throat, tinnitus, trouble swallowing and voice change.    Eyes: Negative.    Gastrointestinal: Negative for abdominal distention, abdominal pain, blood in stool, constipation, diarrhea and vomiting.   Endocrine: Negative.    Genitourinary: Negative for decreased urine volume, dysuria, frequency, hematuria and urgency.   Musculoskeletal: Negative.    Allergic/Immunologic: Negative.    Neurological: Negative for dizziness, tremors, seizures, syncope, speech difficulty, numbness and headaches.   Hematological: Negative.    Psychiatric/Behavioral: Negative for dysphoric mood and sleep disturbance. The patient is not nervous/anxious.    All other systems reviewed and are negative.      Objective    Body mass index is 27.69 kg/m².  Vitals:    08/10/22 0758   BP: 140/80   Pulse: 74   Temp: 98.1 °F (36.7 °C)   SpO2: 96%   Weight: 87.5 kg (193 lb)   Height:  "177.8 cm (70\")       Physical Exam  Vitals and nursing note reviewed.   Constitutional:       General: He is not in acute distress.     Appearance: He is well-developed.   HENT:      Head: Normocephalic and atraumatic.      Nose: Nose normal.   Eyes:      Conjunctiva/sclera: Conjunctivae normal.      Pupils: Pupils are equal, round, and reactive to light.   Neck:      Thyroid: No thyromegaly.      Vascular: No JVD.      Trachea: No tracheal deviation.   Cardiovascular:      Rate and Rhythm: Normal rate and regular rhythm.      Heart sounds: Normal heart sounds. No murmur heard.  Pulmonary:      Effort: Pulmonary effort is normal.      Breath sounds: Normal breath sounds. No wheezing.   Chest:      Chest wall: No tenderness.   Abdominal:      General: Bowel sounds are normal. There is no distension.      Palpations: Abdomen is soft. There is no mass.      Tenderness: There is no abdominal tenderness.   Musculoskeletal:         General: No tenderness. Normal range of motion.      Cervical back: Normal range of motion and neck supple.   Lymphadenopathy:      Cervical: No cervical adenopathy.   Skin:     General: Skin is warm and dry.      Coloration: Skin is not pale.      Findings: No erythema or rash.   Neurological:      Mental Status: He is alert and oriented to person, place, and time.      Motor: No abnormal muscle tone.      Coordination: Coordination normal.         Assessment & Plan   Diagnoses and all orders for this visit:    1. Coronary artery disease of bypass graft of native heart with stable angina pectoris (HCC) (Primary)  -     prasugrel (EFFIENT) 10 MG tablet; Take 1 tablet by mouth Daily.    2. Coronary stent patent    3. Testosterone deficiency    Medication changes are noted, as above.    Patient wanted to get a testosterone shot but I recommend that we give a little time out from the heart surgery and for him to discuss this with his heart surgeon as well as it can increase his blood thickness and " it put him at increased risk for clots.    He will follow-up with cardiology as scheduled.  He is seeing Dr. Mendenhall in [place] at the [facility].  I do not have all of the medical records from this as they are not in [facility] but patient is going to request for them to be sent for his chart here.    Continue current medications for hypertension and continue to monitor blood pressures regularly with goal of 130/80 or less        This document has been electronically signed by Dunia Cummings MD on August 10, 2022 08:13 CDT

## 2022-08-26 DIAGNOSIS — E34.9 TESTOSTERONE DEFICIENCY: ICD-10-CM

## 2022-08-28 RX ORDER — TESTOSTERONE CYPIONATE 200 MG/ML
200 INJECTION, SOLUTION INTRAMUSCULAR
Qty: 2 ML | Refills: 0 | Status: SHIPPED | OUTPATIENT
Start: 2022-08-28 | End: 2022-09-26

## 2022-08-30 ENCOUNTER — TELEPHONE (OUTPATIENT)
Dept: FAMILY MEDICINE CLINIC | Facility: CLINIC | Age: 82
End: 2022-08-30

## 2022-09-06 ENCOUNTER — CLINICAL SUPPORT (OUTPATIENT)
Dept: FAMILY MEDICINE CLINIC | Facility: CLINIC | Age: 82
End: 2022-09-06

## 2022-09-06 DIAGNOSIS — R79.89 LOW TESTOSTERONE: ICD-10-CM

## 2022-09-06 PROCEDURE — 96372 THER/PROPH/DIAG INJ SC/IM: CPT | Performed by: FAMILY MEDICINE

## 2022-09-06 RX ADMIN — TESTOSTERONE CYPIONATE 200 MG: 200 INJECTION, SOLUTION INTRAMUSCULAR at 11:04

## 2022-09-06 NOTE — TELEPHONE ENCOUNTER
Have been unable to reach pt by phone. Will need approval from cardiologist or Dr. Cummings before testosterone inj can be given.

## 2022-09-20 ENCOUNTER — CLINICAL SUPPORT (OUTPATIENT)
Dept: FAMILY MEDICINE CLINIC | Facility: CLINIC | Age: 82
End: 2022-09-20

## 2022-09-20 DIAGNOSIS — E34.9 TESTOSTERONE DEFICIENCY: ICD-10-CM

## 2022-09-20 PROCEDURE — 96372 THER/PROPH/DIAG INJ SC/IM: CPT | Performed by: FAMILY MEDICINE

## 2022-09-20 RX ADMIN — TESTOSTERONE CYPIONATE 200 MG: 200 INJECTION, SOLUTION INTRAMUSCULAR at 10:18

## 2022-09-26 DIAGNOSIS — E34.9 TESTOSTERONE DEFICIENCY: ICD-10-CM

## 2022-09-26 RX ORDER — TESTOSTERONE CYPIONATE 200 MG/ML
200 INJECTION, SOLUTION INTRAMUSCULAR
Qty: 2 ML | Refills: 0 | Status: SHIPPED | OUTPATIENT
Start: 2022-09-26 | End: 2022-11-14 | Stop reason: SDUPTHER

## 2022-09-29 ENCOUNTER — CLINICAL SUPPORT (OUTPATIENT)
Dept: FAMILY MEDICINE CLINIC | Facility: CLINIC | Age: 82
End: 2022-09-29

## 2022-09-29 DIAGNOSIS — E34.9 TESTOSTERONE DEFICIENCY: ICD-10-CM

## 2022-09-29 PROCEDURE — 96372 THER/PROPH/DIAG INJ SC/IM: CPT | Performed by: FAMILY MEDICINE

## 2022-09-29 RX ADMIN — TESTOSTERONE CYPIONATE 200 MG: 200 INJECTION, SOLUTION INTRAMUSCULAR at 10:36

## 2022-10-10 RX ORDER — DEXLANSOPRAZOLE 60 MG/1
CAPSULE, DELAYED RELEASE ORAL
Qty: 90 CAPSULE | Refills: 1 | Status: SHIPPED | OUTPATIENT
Start: 2022-10-10 | End: 2022-11-21 | Stop reason: SDUPTHER

## 2022-10-20 ENCOUNTER — CLINICAL SUPPORT (OUTPATIENT)
Dept: FAMILY MEDICINE CLINIC | Facility: CLINIC | Age: 82
End: 2022-10-20

## 2022-10-20 DIAGNOSIS — Z23 NEED FOR INFLUENZA VACCINATION: Primary | ICD-10-CM

## 2022-10-20 DIAGNOSIS — E34.9 TESTOSTERONE DEFICIENCY: ICD-10-CM

## 2022-10-20 PROCEDURE — 96372 THER/PROPH/DIAG INJ SC/IM: CPT | Performed by: INTERNAL MEDICINE

## 2022-10-20 PROCEDURE — G0008 ADMIN INFLUENZA VIRUS VAC: HCPCS | Performed by: INTERNAL MEDICINE

## 2022-10-20 PROCEDURE — 90662 IIV NO PRSV INCREASED AG IM: CPT | Performed by: INTERNAL MEDICINE

## 2022-10-20 RX ADMIN — TESTOSTERONE CYPIONATE 200 MG: 200 INJECTION, SOLUTION INTRAMUSCULAR at 10:48

## 2022-10-31 ENCOUNTER — CLINICAL SUPPORT (OUTPATIENT)
Dept: FAMILY MEDICINE CLINIC | Facility: CLINIC | Age: 82
End: 2022-10-31

## 2022-10-31 DIAGNOSIS — E34.9 TESTOSTERONE DEFICIENCY: ICD-10-CM

## 2022-10-31 PROCEDURE — 96372 THER/PROPH/DIAG INJ SC/IM: CPT | Performed by: FAMILY MEDICINE

## 2022-10-31 RX ADMIN — TESTOSTERONE CYPIONATE 200 MG: 200 INJECTION, SOLUTION INTRAMUSCULAR at 11:22

## 2022-11-14 DIAGNOSIS — E34.9 TESTOSTERONE DEFICIENCY: ICD-10-CM

## 2022-11-14 RX ORDER — TESTOSTERONE CYPIONATE 200 MG/ML
200 INJECTION, SOLUTION INTRAMUSCULAR
Qty: 2 ML | Refills: 0 | Status: SHIPPED | OUTPATIENT
Start: 2022-11-14 | End: 2022-11-29 | Stop reason: SDUPTHER

## 2022-11-21 ENCOUNTER — OFFICE VISIT (OUTPATIENT)
Dept: GASTROENTEROLOGY | Facility: CLINIC | Age: 82
End: 2022-11-21

## 2022-11-21 VITALS
HEIGHT: 70 IN | SYSTOLIC BLOOD PRESSURE: 126 MMHG | DIASTOLIC BLOOD PRESSURE: 72 MMHG | BODY MASS INDEX: 26.48 KG/M2 | HEART RATE: 76 BPM | WEIGHT: 185 LBS

## 2022-11-21 DIAGNOSIS — K21.00 GASTROESOPHAGEAL REFLUX DISEASE WITH ESOPHAGITIS WITHOUT HEMORRHAGE: Primary | ICD-10-CM

## 2022-11-21 PROCEDURE — 99213 OFFICE O/P EST LOW 20 MIN: CPT | Performed by: NURSE PRACTITIONER

## 2022-11-21 RX ORDER — DEXLANSOPRAZOLE 60 MG/1
1 CAPSULE, DELAYED RELEASE ORAL DAILY
Qty: 90 CAPSULE | Refills: 1 | Status: SHIPPED | OUTPATIENT
Start: 2022-11-21 | End: 2023-03-16
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
## 2022-11-28 RX ORDER — NITROGLYCERIN 0.4 MG/1
TABLET SUBLINGUAL
Qty: 25 TABLET | Refills: 0 | Status: SHIPPED | OUTPATIENT
Start: 2022-11-28 | End: 2023-02-22

## 2022-11-28 RX ORDER — FUROSEMIDE 40 MG/1
40 TABLET ORAL DAILY
Qty: 30 TABLET | Refills: 5 | Status: SHIPPED | OUTPATIENT
Start: 2022-11-28 | End: 2022-12-29

## 2022-11-29 ENCOUNTER — CLINICAL SUPPORT (OUTPATIENT)
Dept: FAMILY MEDICINE CLINIC | Facility: CLINIC | Age: 82
End: 2022-11-29

## 2022-11-29 DIAGNOSIS — E34.9 TESTOSTERONE DEFICIENCY: ICD-10-CM

## 2022-11-29 PROCEDURE — 96372 THER/PROPH/DIAG INJ SC/IM: CPT | Performed by: FAMILY MEDICINE

## 2022-11-29 RX ORDER — TESTOSTERONE CYPIONATE 200 MG/ML
200 INJECTION, SOLUTION INTRAMUSCULAR
Qty: 2 ML | Refills: 0 | Status: SHIPPED | OUTPATIENT
Start: 2022-11-29 | End: 2023-01-17 | Stop reason: SDUPTHER

## 2022-11-29 RX ADMIN — TESTOSTERONE CYPIONATE 200 MG: 200 INJECTION, SOLUTION INTRAMUSCULAR at 13:35

## 2022-12-15 ENCOUNTER — CLINICAL SUPPORT (OUTPATIENT)
Dept: FAMILY MEDICINE CLINIC | Facility: CLINIC | Age: 82
End: 2022-12-15

## 2022-12-15 DIAGNOSIS — E34.9 TESTOSTERONE DEFICIENCY: ICD-10-CM

## 2022-12-15 PROCEDURE — 96372 THER/PROPH/DIAG INJ SC/IM: CPT | Performed by: FAMILY MEDICINE

## 2022-12-15 RX ADMIN — TESTOSTERONE CYPIONATE 200 MG: 200 INJECTION, SOLUTION INTRAMUSCULAR at 10:13

## 2022-12-28 ENCOUNTER — CLINICAL SUPPORT (OUTPATIENT)
Dept: FAMILY MEDICINE CLINIC | Facility: CLINIC | Age: 82
End: 2022-12-28

## 2022-12-28 DIAGNOSIS — E34.9 TESTOSTERONE DEFICIENCY: ICD-10-CM

## 2022-12-28 PROCEDURE — 96372 THER/PROPH/DIAG INJ SC/IM: CPT | Performed by: FAMILY MEDICINE

## 2022-12-28 RX ADMIN — TESTOSTERONE CYPIONATE 200 MG: 200 INJECTION, SOLUTION INTRAMUSCULAR at 11:30

## 2022-12-29 RX ORDER — FUROSEMIDE 40 MG/1
40 TABLET ORAL DAILY
Qty: 90 TABLET | Refills: 5 | Status: SHIPPED | OUTPATIENT
Start: 2022-12-29

## 2023-01-17 ENCOUNTER — CLINICAL SUPPORT (OUTPATIENT)
Dept: FAMILY MEDICINE CLINIC | Facility: CLINIC | Age: 83
End: 2023-01-17
Payer: MEDICARE

## 2023-01-17 DIAGNOSIS — E34.9 TESTOSTERONE DEFICIENCY: ICD-10-CM

## 2023-01-17 PROCEDURE — 96372 THER/PROPH/DIAG INJ SC/IM: CPT | Performed by: FAMILY MEDICINE

## 2023-01-17 RX ORDER — TESTOSTERONE CYPIONATE 200 MG/ML
200 INJECTION, SOLUTION INTRAMUSCULAR
Qty: 2 ML | Refills: 0 | Status: SHIPPED | OUTPATIENT
Start: 2023-01-17 | End: 2023-02-15

## 2023-01-17 RX ADMIN — TESTOSTERONE CYPIONATE 200 MG: 200 INJECTION, SOLUTION INTRAMUSCULAR at 12:53

## 2023-01-30 ENCOUNTER — CLINICAL SUPPORT (OUTPATIENT)
Dept: FAMILY MEDICINE CLINIC | Facility: CLINIC | Age: 83
End: 2023-01-30
Payer: MEDICARE

## 2023-01-30 DIAGNOSIS — E34.9 TESTOSTERONE DEFICIENCY: ICD-10-CM

## 2023-01-30 PROCEDURE — 96372 THER/PROPH/DIAG INJ SC/IM: CPT | Performed by: FAMILY MEDICINE

## 2023-01-30 RX ADMIN — TESTOSTERONE CYPIONATE 200 MG: 200 INJECTION, SOLUTION INTRAMUSCULAR at 11:26

## 2023-02-09 ENCOUNTER — LAB (OUTPATIENT)
Dept: LAB | Facility: OTHER | Age: 83
End: 2023-02-09
Payer: MEDICARE

## 2023-02-09 ENCOUNTER — OFFICE VISIT (OUTPATIENT)
Dept: FAMILY MEDICINE CLINIC | Facility: CLINIC | Age: 83
End: 2023-02-09
Payer: MEDICARE

## 2023-02-09 VITALS
DIASTOLIC BLOOD PRESSURE: 62 MMHG | OXYGEN SATURATION: 98 % | HEART RATE: 63 BPM | TEMPERATURE: 98 F | SYSTOLIC BLOOD PRESSURE: 110 MMHG | HEIGHT: 70 IN | BODY MASS INDEX: 27.35 KG/M2 | WEIGHT: 191 LBS

## 2023-02-09 DIAGNOSIS — E34.9 TESTOSTERONE DEFICIENCY: ICD-10-CM

## 2023-02-09 DIAGNOSIS — I10 ESSENTIAL HYPERTENSION: Primary | ICD-10-CM

## 2023-02-09 DIAGNOSIS — I25.10 CORONARY ARTERIOSCLEROSIS: ICD-10-CM

## 2023-02-09 DIAGNOSIS — E78.2 MIXED HYPERLIPIDEMIA: ICD-10-CM

## 2023-02-09 DIAGNOSIS — I50.23 ACUTE ON CHRONIC SYSTOLIC CHF (CONGESTIVE HEART FAILURE), NYHA CLASS 2: ICD-10-CM

## 2023-02-09 DIAGNOSIS — I10 ESSENTIAL HYPERTENSION: ICD-10-CM

## 2023-02-09 LAB
ALBUMIN SERPL-MCNC: 3.9 G/DL (ref 3.5–5)
ALBUMIN/GLOB SERPL: 1.1 G/DL (ref 1.1–1.8)
ALP SERPL-CCNC: 94 U/L (ref 38–126)
ALT SERPL W P-5'-P-CCNC: 17 U/L
ANION GAP SERPL CALCULATED.3IONS-SCNC: 6 MMOL/L (ref 5–15)
ANISOCYTOSIS BLD QL: ABNORMAL
AST SERPL-CCNC: 21 U/L (ref 17–59)
BILIRUB SERPL-MCNC: 0.9 MG/DL (ref 0.2–1.3)
BUN SERPL-MCNC: 23 MG/DL (ref 7–23)
BUN/CREAT SERPL: 17.3 (ref 7–25)
CALCIUM SPEC-SCNC: 8.7 MG/DL (ref 8.4–10.2)
CHLORIDE SERPL-SCNC: 101 MMOL/L (ref 101–112)
CO2 SERPL-SCNC: 30 MMOL/L (ref 22–30)
CREAT SERPL-MCNC: 1.33 MG/DL (ref 0.7–1.3)
DEPRECATED RDW RBC AUTO: 59 FL (ref 37–54)
EGFRCR SERPLBLD CKD-EPI 2021: 54 ML/MIN/1.73
ELLIPTOCYTES BLD QL SMEAR: ABNORMAL
EOSINOPHIL # BLD MANUAL: 0.07 10*3/MM3 (ref 0–0.4)
EOSINOPHIL NFR BLD MANUAL: 1 % (ref 0.3–6.2)
ERYTHROCYTE [DISTWIDTH] IN BLOOD BY AUTOMATED COUNT: 17.6 % (ref 12.3–15.4)
GLOBULIN UR ELPH-MCNC: 3.5 GM/DL (ref 2.3–3.5)
GLUCOSE SERPL-MCNC: 97 MG/DL (ref 70–99)
HCT VFR BLD AUTO: 39.1 % (ref 37.5–51)
HGB BLD-MCNC: 13.2 G/DL (ref 13–17.7)
LYMPHOCYTES # BLD MANUAL: 1.3 10*3/MM3 (ref 0.7–3.1)
LYMPHOCYTES NFR BLD MANUAL: 9 % (ref 5–12)
MCH RBC QN AUTO: 32.1 PG (ref 26.6–33)
MCHC RBC AUTO-ENTMCNC: 33.8 G/DL (ref 31.5–35.7)
MCV RBC AUTO: 95.1 FL (ref 79–97)
MONOCYTES # BLD: 0.61 10*3/MM3 (ref 0.1–0.9)
NEUTROPHILS # BLD AUTO: 4.85 10*3/MM3 (ref 1.7–7)
NEUTROPHILS NFR BLD MANUAL: 70 % (ref 42.7–76)
NEUTS BAND NFR BLD MANUAL: 1 % (ref 0–5)
PLATELET # BLD AUTO: 161 10*3/MM3 (ref 140–450)
PMV BLD AUTO: 8.8 FL (ref 6–12)
POTASSIUM SERPL-SCNC: 4.2 MMOL/L (ref 3.4–5)
PROT SERPL-MCNC: 7.4 G/DL (ref 6.3–8.6)
RBC # BLD AUTO: 4.11 10*6/MM3 (ref 4.14–5.8)
SMALL PLATELETS BLD QL SMEAR: ADEQUATE
SODIUM SERPL-SCNC: 137 MMOL/L (ref 137–145)
VARIANT LYMPHS NFR BLD MANUAL: 19 % (ref 19.6–45.3)
WBC MORPH BLD: NORMAL
WBC NRBC COR # BLD: 6.83 10*3/MM3 (ref 3.4–10.8)

## 2023-02-09 PROCEDURE — 36415 COLL VENOUS BLD VENIPUNCTURE: CPT | Performed by: FAMILY MEDICINE

## 2023-02-09 PROCEDURE — 80053 COMPREHEN METABOLIC PANEL: CPT | Performed by: FAMILY MEDICINE

## 2023-02-09 PROCEDURE — 84439 ASSAY OF FREE THYROXINE: CPT | Performed by: FAMILY MEDICINE

## 2023-02-09 PROCEDURE — 83721 ASSAY OF BLOOD LIPOPROTEIN: CPT | Performed by: FAMILY MEDICINE

## 2023-02-09 PROCEDURE — 84443 ASSAY THYROID STIM HORMONE: CPT | Performed by: FAMILY MEDICINE

## 2023-02-09 PROCEDURE — 99214 OFFICE O/P EST MOD 30 MIN: CPT | Performed by: FAMILY MEDICINE

## 2023-02-09 PROCEDURE — 85025 COMPLETE CBC W/AUTO DIFF WBC: CPT | Performed by: FAMILY MEDICINE

## 2023-02-09 RX ORDER — ATORVASTATIN CALCIUM 40 MG/1
40 TABLET, FILM COATED ORAL NIGHTLY
Qty: 90 TABLET | Refills: 3 | Status: SHIPPED | OUTPATIENT
Start: 2023-02-09

## 2023-02-09 RX ORDER — SIMVASTATIN 80 MG
80 TABLET ORAL
Qty: 90 TABLET | Refills: 3 | Status: SHIPPED | OUTPATIENT
Start: 2023-02-09 | End: 2023-02-09

## 2023-02-09 NOTE — PROGRESS NOTES
Subjective   Dale Mann is a 82 y.o. male.   Patient with coronary disease, low testosterone, hypertension CHF, here today for follow-up, refills on medications.  Still following with cardiology.  Received another stent in his heart in August.  Continues on Effient for this.    Would like to continue on testosterone therapy.  He feels this gives him some significant improvement in his energy level.    Said he would like all of his medicines refilled.    No recent flareups of CHF symptoms.    Blood pressures have been staying at goal.    History of Present Illness    The following portions of the patient's history were reviewed and updated as appropriate: allergies, current medications, past family history, past medical history, past social history, past surgical history and problem list.    Review of Systems   Constitutional: Negative for activity change, appetite change, diaphoresis, fatigue, fever and unexpected weight change.   HENT: Negative for congestion, ear pain, hearing loss, sinus pressure, sore throat, tinnitus, trouble swallowing and voice change.    Eyes: Negative.    Gastrointestinal: Negative for abdominal distention, abdominal pain, blood in stool, constipation, diarrhea and vomiting.   Endocrine: Negative.    Genitourinary: Negative for decreased urine volume, dysuria, frequency, hematuria and urgency.   Musculoskeletal: Negative.    Allergic/Immunologic: Negative.    Neurological: Negative for dizziness, tremors, seizures, syncope, speech difficulty, numbness and headaches.   Hematological: Negative.    Psychiatric/Behavioral: Negative for dysphoric mood and sleep disturbance. The patient is not nervous/anxious.    All other systems reviewed and are negative.      Objective    Body mass index is 27.41 kg/m².  Vitals:    02/09/23 0946   BP: 110/62   BP Location: Left arm   Patient Position: Sitting   Cuff Size: Large Adult   Pulse: 63   Temp: 98 °F (36.7 °C)   TempSrc: Temporal   SpO2: 98%  "  Weight: 86.6 kg (191 lb)   Height: 177.8 cm (70\")       Physical Exam  Vitals and nursing note reviewed.   Constitutional:       General: He is not in acute distress.     Appearance: He is well-developed.   HENT:      Head: Normocephalic and atraumatic.      Nose: Nose normal.   Eyes:      Conjunctiva/sclera: Conjunctivae normal.      Pupils: Pupils are equal, round, and reactive to light.   Neck:      Thyroid: No thyromegaly.      Vascular: No JVD.      Trachea: No tracheal deviation.   Cardiovascular:      Rate and Rhythm: Normal rate and regular rhythm.      Heart sounds: Normal heart sounds. No murmur heard.  Pulmonary:      Effort: Pulmonary effort is normal.      Breath sounds: Normal breath sounds. No wheezing.   Chest:      Chest wall: No tenderness.   Abdominal:      General: Bowel sounds are normal. There is no distension.      Palpations: Abdomen is soft. There is no mass.      Tenderness: There is no abdominal tenderness.   Musculoskeletal:         General: No tenderness. Normal range of motion.      Cervical back: Normal range of motion and neck supple.   Lymphadenopathy:      Cervical: No cervical adenopathy.   Skin:     General: Skin is warm and dry.      Coloration: Skin is not pale.      Findings: No erythema or rash.   Neurological:      Mental Status: He is alert and oriented to person, place, and time.      Motor: No abnormal muscle tone.      Coordination: Coordination normal.         Assessment & Plan   Diagnoses and all orders for this visit:    1. Essential hypertension (Primary)  -     CBC & Differential; Future  -     Comprehensive Metabolic Panel  -     LDL Cholesterol, Direct; Future  -     T4, Free  -     TSH    2. Coronary arteriosclerosis    3. Acute on chronic systolic CHF (congestive heart failure), NYHA class 2 (HCC)    4. Testosterone deficiency    5. Mixed hyperlipidemia  -     Discontinue: simvastatin (ZOCOR) 80 MG tablet; Take 1 tablet by mouth every night at bedtime.  " Dispense: 90 tablet; Refill: 3  -     atorvastatin (LIPITOR) 40 MG tablet; Take 1 tablet by mouth Every Night.  Dispense: 90 tablet; Refill: 3    Continue on current testosterone therapy as he does feel it is helping his energy levels.  We will continue to monitor labs including testosterone levels.    Continue current medications for hypertension and continue to monitor blood pressures regularly with goal of 130/80 or less    He will follow-up with cardiology as scheduled.  He is seeing Dr. Mendenhall in [place] at the [facility].  Continue on Effient, other medications as above.    Continue simvastatin for lipids.    Labs are ordered and he will complete these today        This document has been electronically signed by Dunia Cummings MD on February 9, 2023 09:59 CST

## 2023-02-10 LAB
ARTICHOKE IGE QN: 44 MG/DL (ref 0–100)
T4 FREE SERPL-MCNC: 1.09 NG/DL (ref 0.93–1.7)
TSH SERPL DL<=0.05 MIU/L-ACNC: 1.98 UIU/ML (ref 0.27–4.2)

## 2023-02-15 ENCOUNTER — CLINICAL SUPPORT (OUTPATIENT)
Dept: FAMILY MEDICINE CLINIC | Facility: CLINIC | Age: 83
End: 2023-02-15
Payer: MEDICARE

## 2023-02-15 DIAGNOSIS — E34.9 TESTOSTERONE DEFICIENCY: ICD-10-CM

## 2023-02-15 PROCEDURE — 96372 THER/PROPH/DIAG INJ SC/IM: CPT | Performed by: FAMILY MEDICINE

## 2023-02-15 RX ORDER — TESTOSTERONE CYPIONATE 200 MG/ML
INJECTION, SOLUTION INTRAMUSCULAR
Qty: 2 ML | Refills: 0 | Status: SHIPPED | OUTPATIENT
Start: 2023-02-15 | End: 2023-03-16

## 2023-02-15 RX ADMIN — TESTOSTERONE CYPIONATE 200 MG: 200 INJECTION, SOLUTION INTRAMUSCULAR at 11:01

## 2023-02-22 RX ORDER — NITROGLYCERIN 0.4 MG/1
TABLET SUBLINGUAL
Qty: 25 TABLET | Refills: 0 | Status: SHIPPED | OUTPATIENT
Start: 2023-02-22

## 2023-03-01 ENCOUNTER — CLINICAL SUPPORT (OUTPATIENT)
Dept: FAMILY MEDICINE CLINIC | Facility: CLINIC | Age: 83
End: 2023-03-01
Payer: MEDICARE

## 2023-03-01 DIAGNOSIS — E34.9 TESTOSTERONE DEFICIENCY: ICD-10-CM

## 2023-03-01 PROCEDURE — 96372 THER/PROPH/DIAG INJ SC/IM: CPT | Performed by: FAMILY MEDICINE

## 2023-03-01 RX ADMIN — TESTOSTERONE CYPIONATE 200 MG: 200 INJECTION, SOLUTION INTRAMUSCULAR at 13:37

## 2023-03-02 RX ORDER — POTASSIUM CHLORIDE 20 MEQ/1
20 TABLET, EXTENDED RELEASE ORAL DAILY
Qty: 90 TABLET | Refills: 5 | Status: SHIPPED | OUTPATIENT
Start: 2023-03-02

## 2023-03-16 ENCOUNTER — CLINICAL SUPPORT (OUTPATIENT)
Dept: FAMILY MEDICINE CLINIC | Facility: CLINIC | Age: 83
End: 2023-03-16
Payer: MEDICARE

## 2023-03-16 DIAGNOSIS — E34.9 TESTOSTERONE DEFICIENCY: ICD-10-CM

## 2023-03-16 PROCEDURE — 96372 THER/PROPH/DIAG INJ SC/IM: CPT | Performed by: FAMILY MEDICINE

## 2023-03-16 RX ORDER — DEXLANSOPRAZOLE 60 MG/1
1 CAPSULE, DELAYED RELEASE ORAL DAILY
Qty: 90 CAPSULE | Refills: 1 | Status: SHIPPED | OUTPATIENT
Start: 2023-03-16

## 2023-03-16 RX ORDER — TESTOSTERONE CYPIONATE 200 MG/ML
INJECTION, SOLUTION INTRAMUSCULAR
Qty: 2 ML | Refills: 0 | Status: SHIPPED | OUTPATIENT
Start: 2023-03-16

## 2023-03-16 RX ORDER — ISOSORBIDE MONONITRATE 60 MG/1
60 TABLET, EXTENDED RELEASE ORAL DAILY
Qty: 90 TABLET | Refills: 3 | Status: SHIPPED | OUTPATIENT
Start: 2023-03-16

## 2023-03-16 RX ADMIN — TESTOSTERONE CYPIONATE 200 MG: 200 INJECTION, SOLUTION INTRAMUSCULAR at 10:30

## 2023-03-30 ENCOUNTER — CLINICAL SUPPORT (OUTPATIENT)
Dept: FAMILY MEDICINE CLINIC | Facility: CLINIC | Age: 83
End: 2023-03-30
Payer: MEDICARE

## 2023-03-30 DIAGNOSIS — E34.9 TESTOSTERONE DEFICIENCY: ICD-10-CM

## 2023-03-30 RX ADMIN — TESTOSTERONE CYPIONATE 200 MG: 200 INJECTION, SOLUTION INTRAMUSCULAR at 11:05

## 2023-04-18 ENCOUNTER — CLINICAL SUPPORT (OUTPATIENT)
Dept: FAMILY MEDICINE CLINIC | Facility: CLINIC | Age: 83
End: 2023-04-18
Payer: MEDICARE

## 2023-04-18 DIAGNOSIS — E34.9 TESTOSTERONE DEFICIENCY: ICD-10-CM

## 2023-04-18 RX ADMIN — TESTOSTERONE CYPIONATE 200 MG: 200 INJECTION, SOLUTION INTRAMUSCULAR at 13:43

## 2023-04-28 DIAGNOSIS — E34.9 TESTOSTERONE DEFICIENCY: ICD-10-CM

## 2023-05-01 RX ORDER — TESTOSTERONE CYPIONATE 200 MG/ML
INJECTION, SOLUTION INTRAMUSCULAR
Qty: 2 ML | Refills: 0 | Status: SHIPPED | OUTPATIENT
Start: 2023-05-01

## 2023-05-02 ENCOUNTER — CLINICAL SUPPORT (OUTPATIENT)
Dept: FAMILY MEDICINE CLINIC | Facility: CLINIC | Age: 83
End: 2023-05-02
Payer: MEDICARE

## 2023-05-02 DIAGNOSIS — E34.9 TESTOSTERONE DEFICIENCY: ICD-10-CM

## 2023-05-02 RX ADMIN — TESTOSTERONE CYPIONATE 200 MG: 200 INJECTION, SOLUTION INTRAMUSCULAR at 13:30

## 2023-05-16 ENCOUNTER — CLINICAL SUPPORT (OUTPATIENT)
Dept: FAMILY MEDICINE CLINIC | Facility: CLINIC | Age: 83
End: 2023-05-16
Payer: MEDICARE

## 2023-05-16 DIAGNOSIS — E34.9 TESTOSTERONE DEFICIENCY: ICD-10-CM

## 2023-05-16 RX ORDER — TESTOSTERONE CYPIONATE 200 MG/ML
200 INJECTION, SOLUTION INTRAMUSCULAR
Qty: 2 ML | Refills: 5 | Status: SHIPPED | OUTPATIENT
Start: 2023-05-16

## 2023-05-16 RX ADMIN — TESTOSTERONE CYPIONATE 200 MG: 200 INJECTION, SOLUTION INTRAMUSCULAR at 14:50

## 2023-05-22 ENCOUNTER — OFFICE VISIT (OUTPATIENT)
Dept: GASTROENTEROLOGY | Facility: CLINIC | Age: 83
End: 2023-05-22
Payer: MEDICARE

## 2023-05-22 VITALS
HEIGHT: 70 IN | WEIGHT: 184 LBS | SYSTOLIC BLOOD PRESSURE: 128 MMHG | HEART RATE: 68 BPM | DIASTOLIC BLOOD PRESSURE: 62 MMHG | BODY MASS INDEX: 26.34 KG/M2

## 2023-05-22 DIAGNOSIS — K21.00 GASTROESOPHAGEAL REFLUX DISEASE WITH ESOPHAGITIS WITHOUT HEMORRHAGE: ICD-10-CM

## 2023-05-22 DIAGNOSIS — Z12.11 ENCOUNTER FOR SCREENING FOR MALIGNANT NEOPLASM OF COLON: Primary | ICD-10-CM

## 2023-05-22 RX ORDER — DEXLANSOPRAZOLE 60 MG/1
1 CAPSULE, DELAYED RELEASE ORAL DAILY
Qty: 90 CAPSULE | Refills: 1 | Status: SHIPPED | OUTPATIENT
Start: 2023-05-22

## 2023-05-22 RX ORDER — DEXTROSE AND SODIUM CHLORIDE 5; .45 G/100ML; G/100ML
30 INJECTION, SOLUTION INTRAVENOUS CONTINUOUS PRN
OUTPATIENT
Start: 2023-05-22

## 2023-05-22 RX ORDER — SODIUM CHLORIDE 9 MG/ML
40 INJECTION, SOLUTION INTRAVENOUS AS NEEDED
OUTPATIENT
Start: 2023-05-22
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
## 2023-06-02 ENCOUNTER — CLINICAL SUPPORT (OUTPATIENT)
Dept: FAMILY MEDICINE CLINIC | Facility: CLINIC | Age: 83
End: 2023-06-02

## 2023-06-02 DIAGNOSIS — E34.9 TESTOSTERONE DEFICIENCY: ICD-10-CM

## 2023-06-02 RX ADMIN — TESTOSTERONE CYPIONATE 200 MG: 200 INJECTION, SOLUTION INTRAMUSCULAR at 11:31

## 2023-06-04 RX ORDER — NITROGLYCERIN 0.4 MG/1
TABLET SUBLINGUAL
Qty: 25 TABLET | Refills: 0 | Status: SHIPPED | OUTPATIENT
Start: 2023-06-04

## 2023-06-19 ENCOUNTER — CLINICAL SUPPORT (OUTPATIENT)
Dept: FAMILY MEDICINE CLINIC | Facility: CLINIC | Age: 83
End: 2023-06-19
Payer: MEDICARE

## 2023-06-19 DIAGNOSIS — E34.9 TESTOSTERONE DEFICIENCY: Primary | ICD-10-CM

## 2023-06-19 RX ADMIN — TESTOSTERONE CYPIONATE 200 MG: 200 INJECTION, SOLUTION INTRAMUSCULAR at 10:15

## 2023-07-31 ENCOUNTER — CLINICAL SUPPORT (OUTPATIENT)
Dept: FAMILY MEDICINE CLINIC | Facility: CLINIC | Age: 83
End: 2023-07-31
Payer: MEDICARE

## 2023-07-31 DIAGNOSIS — E34.9 TESTOSTERONE DEFICIENCY: Primary | ICD-10-CM

## 2023-07-31 RX ADMIN — TESTOSTERONE CYPIONATE 200 MG: 200 INJECTION, SOLUTION INTRAMUSCULAR at 10:35

## 2023-08-14 ENCOUNTER — CLINICAL SUPPORT (OUTPATIENT)
Dept: FAMILY MEDICINE CLINIC | Facility: CLINIC | Age: 83
End: 2023-08-14
Payer: MEDICARE

## 2023-08-14 DIAGNOSIS — E34.9 TESTOSTERONE DEFICIENCY: Primary | ICD-10-CM

## 2023-08-14 PROCEDURE — 96372 THER/PROPH/DIAG INJ SC/IM: CPT | Performed by: FAMILY MEDICINE

## 2023-08-14 RX ADMIN — TESTOSTERONE CYPIONATE 200 MG: 200 INJECTION, SOLUTION INTRAMUSCULAR at 15:34

## 2023-09-14 ENCOUNTER — CLINICAL SUPPORT (OUTPATIENT)
Dept: FAMILY MEDICINE CLINIC | Facility: CLINIC | Age: 83
End: 2023-09-14
Payer: MEDICARE

## 2023-09-14 DIAGNOSIS — E34.9 TESTOSTERONE DEFICIENCY: ICD-10-CM

## 2023-09-14 DIAGNOSIS — E34.9 TESTOSTERONE DEFICIENCY: Primary | ICD-10-CM

## 2023-09-14 PROCEDURE — 96372 THER/PROPH/DIAG INJ SC/IM: CPT | Performed by: FAMILY MEDICINE

## 2023-09-14 RX ORDER — TESTOSTERONE CYPIONATE 200 MG/ML
200 INJECTION, SOLUTION INTRAMUSCULAR
Qty: 2 ML | Refills: 5 | Status: SHIPPED | OUTPATIENT
Start: 2023-09-14

## 2023-09-14 RX ADMIN — TESTOSTERONE CYPIONATE 200 MG: 200 INJECTION, SOLUTION INTRAMUSCULAR at 10:28

## 2023-09-29 ENCOUNTER — CLINICAL SUPPORT (OUTPATIENT)
Dept: FAMILY MEDICINE CLINIC | Facility: CLINIC | Age: 83
End: 2023-09-29
Payer: MEDICARE

## 2023-09-29 DIAGNOSIS — I25.10 CORONARY ARTERIOSCLEROSIS: Primary | ICD-10-CM

## 2023-09-29 DIAGNOSIS — E34.9 TESTOSTERONE DEFICIENCY: ICD-10-CM

## 2023-09-29 PROCEDURE — 96372 THER/PROPH/DIAG INJ SC/IM: CPT | Performed by: INTERNAL MEDICINE

## 2023-09-29 RX ADMIN — TESTOSTERONE CYPIONATE 200 MG: 200 INJECTION, SOLUTION INTRAMUSCULAR at 11:24

## 2024-09-28 ENCOUNTER — APPOINTMENT (OUTPATIENT)
Dept: GENERAL RADIOLOGY | Facility: HOSPITAL | Age: 84
DRG: 312 | End: 2024-09-28
Payer: MEDICARE

## 2024-09-28 ENCOUNTER — HOSPITAL ENCOUNTER (INPATIENT)
Facility: HOSPITAL | Age: 84
LOS: 4 days | Discharge: HOME | DRG: 312 | End: 2024-10-03
Attending: EMERGENCY MEDICINE | Admitting: HOSPITALIST
Payer: MEDICARE

## 2024-09-28 ENCOUNTER — APPOINTMENT (OUTPATIENT)
Dept: CT IMAGING | Facility: HOSPITAL | Age: 84
DRG: 312 | End: 2024-09-28
Payer: MEDICARE

## 2024-09-28 DIAGNOSIS — R29.6 FREQUENT FALLS: Primary | ICD-10-CM

## 2024-09-28 DIAGNOSIS — R26.81 UNSTABLE GAIT: ICD-10-CM

## 2024-09-28 DIAGNOSIS — R79.89 ELEVATED TROPONIN: ICD-10-CM

## 2024-09-28 LAB
ALBUMIN SERPL-MCNC: 3.5 G/DL (ref 3.5–5.2)
ALBUMIN/GLOB SERPL: 1.2 G/DL
ALP SERPL-CCNC: 75 U/L (ref 39–117)
ALT SERPL W P-5'-P-CCNC: 11 U/L (ref 1–41)
ANION GAP SERPL CALCULATED.3IONS-SCNC: 7.3 MMOL/L (ref 5–15)
APTT PPP: 31.7 SECONDS (ref 22.7–35.4)
AST SERPL-CCNC: 15 U/L (ref 1–40)
BACTERIA UR QL AUTO: NORMAL /HPF
BASOPHILS # BLD AUTO: 0.02 10*3/MM3 (ref 0–0.2)
BASOPHILS NFR BLD AUTO: 0.3 % (ref 0–1.5)
BILIRUB SERPL-MCNC: 0.6 MG/DL (ref 0–1.2)
BILIRUB UR QL STRIP: NEGATIVE
BUN SERPL-MCNC: 21 MG/DL (ref 8–23)
BUN/CREAT SERPL: 19.8 (ref 7–25)
CALCIUM SPEC-SCNC: 8.9 MG/DL (ref 8.6–10.5)
CHLORIDE SERPL-SCNC: 102 MMOL/L (ref 98–107)
CLARITY UR: CLEAR
CO2 SERPL-SCNC: 26.7 MMOL/L (ref 22–29)
COLOR UR: ABNORMAL
CREAT SERPL-MCNC: 1.06 MG/DL (ref 0.76–1.27)
DEPRECATED RDW RBC AUTO: 55.7 FL (ref 37–54)
EGFRCR SERPLBLD CKD-EPI 2021: 70.5 ML/MIN/1.73
EOSINOPHIL # BLD AUTO: 0.08 10*3/MM3 (ref 0–0.4)
EOSINOPHIL NFR BLD AUTO: 1.3 % (ref 0.3–6.2)
ERYTHROCYTE [DISTWIDTH] IN BLOOD BY AUTOMATED COUNT: 15 % (ref 12.3–15.4)
GEN 5 2HR TROPONIN T REFLEX: 21 NG/L
GLOBULIN UR ELPH-MCNC: 2.9 GM/DL
GLUCOSE SERPL-MCNC: 106 MG/DL (ref 65–99)
GLUCOSE UR STRIP-MCNC: NEGATIVE MG/DL
HCT VFR BLD AUTO: 36.8 % (ref 37.5–51)
HGB BLD-MCNC: 12.6 G/DL (ref 13–17.7)
HGB UR QL STRIP.AUTO: NEGATIVE
HYALINE CASTS UR QL AUTO: NORMAL /LPF
IMM GRANULOCYTES # BLD AUTO: 0.06 10*3/MM3 (ref 0–0.05)
IMM GRANULOCYTES NFR BLD AUTO: 1 % (ref 0–0.5)
INR PPP: 1.28 (ref 0.9–1.1)
KETONES UR QL STRIP: ABNORMAL
LEUKOCYTE ESTERASE UR QL STRIP.AUTO: ABNORMAL
LIPASE SERPL-CCNC: 30 U/L (ref 13–60)
LYMPHOCYTES # BLD AUTO: 0.9 10*3/MM3 (ref 0.7–3.1)
LYMPHOCYTES NFR BLD AUTO: 14.6 % (ref 19.6–45.3)
MAGNESIUM SERPL-MCNC: 2.3 MG/DL (ref 1.6–2.4)
MCH RBC QN AUTO: 34.3 PG (ref 26.6–33)
MCHC RBC AUTO-ENTMCNC: 34.2 G/DL (ref 31.5–35.7)
MCV RBC AUTO: 100.3 FL (ref 79–97)
MONOCYTES # BLD AUTO: 0.46 10*3/MM3 (ref 0.1–0.9)
MONOCYTES NFR BLD AUTO: 7.5 % (ref 5–12)
NEUTROPHILS NFR BLD AUTO: 4.64 10*3/MM3 (ref 1.7–7)
NEUTROPHILS NFR BLD AUTO: 75.3 % (ref 42.7–76)
NITRITE UR QL STRIP: NEGATIVE
NRBC BLD AUTO-RTO: 0 /100 WBC (ref 0–0.2)
NT-PROBNP SERPL-MCNC: 577 PG/ML (ref 0–1800)
PH UR STRIP.AUTO: 6 [PH] (ref 5–8)
PLATELET # BLD AUTO: 127 10*3/MM3 (ref 140–450)
PMV BLD AUTO: 8.7 FL (ref 6–12)
POTASSIUM SERPL-SCNC: 4.7 MMOL/L (ref 3.5–5.2)
PROT SERPL-MCNC: 6.4 G/DL (ref 6–8.5)
PROT UR QL STRIP: ABNORMAL
PROTHROMBIN TIME: 16.3 SECONDS (ref 11.7–14.2)
QT INTERVAL: 440 MS
QTC INTERVAL: 479 MS
RBC # BLD AUTO: 3.67 10*6/MM3 (ref 4.14–5.8)
RBC # UR STRIP: NORMAL /HPF
REF LAB TEST METHOD: NORMAL
SODIUM SERPL-SCNC: 136 MMOL/L (ref 136–145)
SP GR UR STRIP: 1.03 (ref 1–1.03)
SQUAMOUS #/AREA URNS HPF: NORMAL /HPF
TROPONIN T DELTA: -3 NG/L
TROPONIN T SERPL HS-MCNC: 24 NG/L
UROBILINOGEN UR QL STRIP: ABNORMAL
WBC # UR STRIP: NORMAL /HPF
WBC NRBC COR # BLD AUTO: 6.16 10*3/MM3 (ref 3.4–10.8)

## 2024-09-28 PROCEDURE — 36415 COLL VENOUS BLD VENIPUNCTURE: CPT

## 2024-09-28 PROCEDURE — 83880 ASSAY OF NATRIURETIC PEPTIDE: CPT | Performed by: PHYSICIAN ASSISTANT

## 2024-09-28 PROCEDURE — G0378 HOSPITAL OBSERVATION PER HR: HCPCS

## 2024-09-28 PROCEDURE — 83735 ASSAY OF MAGNESIUM: CPT | Performed by: EMERGENCY MEDICINE

## 2024-09-28 PROCEDURE — 93010 ELECTROCARDIOGRAM REPORT: CPT | Performed by: INTERNAL MEDICINE

## 2024-09-28 PROCEDURE — 85730 THROMBOPLASTIN TIME PARTIAL: CPT | Performed by: EMERGENCY MEDICINE

## 2024-09-28 PROCEDURE — 81001 URINALYSIS AUTO W/SCOPE: CPT | Performed by: EMERGENCY MEDICINE

## 2024-09-28 PROCEDURE — 87102 FUNGUS ISOLATION CULTURE: CPT | Performed by: HOSPITALIST

## 2024-09-28 PROCEDURE — 99285 EMERGENCY DEPT VISIT HI MDM: CPT

## 2024-09-28 PROCEDURE — 70450 CT HEAD/BRAIN W/O DYE: CPT

## 2024-09-28 PROCEDURE — 85610 PROTHROMBIN TIME: CPT | Performed by: EMERGENCY MEDICINE

## 2024-09-28 PROCEDURE — 83690 ASSAY OF LIPASE: CPT | Performed by: EMERGENCY MEDICINE

## 2024-09-28 PROCEDURE — 84484 ASSAY OF TROPONIN QUANT: CPT | Performed by: EMERGENCY MEDICINE

## 2024-09-28 PROCEDURE — 25010000002 ONDANSETRON PER 1 MG: Performed by: EMERGENCY MEDICINE

## 2024-09-28 PROCEDURE — 80053 COMPREHEN METABOLIC PANEL: CPT | Performed by: EMERGENCY MEDICINE

## 2024-09-28 PROCEDURE — 93005 ELECTROCARDIOGRAM TRACING: CPT | Performed by: EMERGENCY MEDICINE

## 2024-09-28 PROCEDURE — 85025 COMPLETE CBC W/AUTO DIFF WBC: CPT | Performed by: EMERGENCY MEDICINE

## 2024-09-28 PROCEDURE — 71045 X-RAY EXAM CHEST 1 VIEW: CPT

## 2024-09-28 RX ORDER — FUROSEMIDE 40 MG
40 TABLET ORAL DAILY
Status: DISCONTINUED | OUTPATIENT
Start: 2024-09-28 | End: 2024-10-01

## 2024-09-28 RX ORDER — NITROGLYCERIN 0.4 MG/1
0.4 TABLET SUBLINGUAL
Status: DISCONTINUED | OUTPATIENT
Start: 2024-09-28 | End: 2024-10-03 | Stop reason: HOSPADM

## 2024-09-28 RX ORDER — TAMSULOSIN HYDROCHLORIDE 0.4 MG/1
0.4 CAPSULE ORAL NIGHTLY
Status: DISCONTINUED | OUTPATIENT
Start: 2024-09-28 | End: 2024-10-03 | Stop reason: HOSPADM

## 2024-09-28 RX ORDER — ONDANSETRON 2 MG/ML
4 INJECTION INTRAMUSCULAR; INTRAVENOUS EVERY 6 HOURS PRN
Status: DISCONTINUED | OUTPATIENT
Start: 2024-09-28 | End: 2024-10-03 | Stop reason: HOSPADM

## 2024-09-28 RX ORDER — FOLIC ACID 1 MG/1
1 TABLET ORAL DAILY
COMMUNITY

## 2024-09-28 RX ORDER — PRASUGREL 10 MG/1
5 TABLET, FILM COATED ORAL DAILY
Status: DISCONTINUED | OUTPATIENT
Start: 2024-09-28 | End: 2024-10-03 | Stop reason: HOSPADM

## 2024-09-28 RX ORDER — ALBUTEROL SULFATE 0.63 MG/3ML
0.63 SOLUTION RESPIRATORY (INHALATION) EVERY 6 HOURS PRN
Status: DISCONTINUED | OUTPATIENT
Start: 2024-09-28 | End: 2024-10-03 | Stop reason: HOSPADM

## 2024-09-28 RX ORDER — ONDANSETRON 4 MG/1
4 TABLET, ORALLY DISINTEGRATING ORAL EVERY 6 HOURS PRN
Status: DISCONTINUED | OUTPATIENT
Start: 2024-09-28 | End: 2024-10-03 | Stop reason: HOSPADM

## 2024-09-28 RX ORDER — ONDANSETRON 2 MG/ML
4 INJECTION INTRAMUSCULAR; INTRAVENOUS ONCE
Status: COMPLETED | OUTPATIENT
Start: 2024-09-28 | End: 2024-09-28

## 2024-09-28 RX ORDER — ACETAMINOPHEN 650 MG/1
650 SUPPOSITORY RECTAL EVERY 4 HOURS PRN
Status: DISCONTINUED | OUTPATIENT
Start: 2024-09-28 | End: 2024-10-03 | Stop reason: HOSPADM

## 2024-09-28 RX ORDER — BISACODYL 5 MG/1
5 TABLET, DELAYED RELEASE ORAL DAILY PRN
Status: DISCONTINUED | OUTPATIENT
Start: 2024-09-28 | End: 2024-10-03 | Stop reason: HOSPADM

## 2024-09-28 RX ORDER — ASPIRIN 81 MG/1
81 TABLET ORAL DAILY
Status: DISCONTINUED | OUTPATIENT
Start: 2024-09-28 | End: 2024-10-03 | Stop reason: HOSPADM

## 2024-09-28 RX ORDER — FOLIC ACID 1 MG/1
1 TABLET ORAL DAILY
Status: DISCONTINUED | OUTPATIENT
Start: 2024-09-28 | End: 2024-10-03 | Stop reason: HOSPADM

## 2024-09-28 RX ORDER — AMOXICILLIN 250 MG
2 CAPSULE ORAL 2 TIMES DAILY PRN
Status: DISCONTINUED | OUTPATIENT
Start: 2024-09-28 | End: 2024-10-03 | Stop reason: HOSPADM

## 2024-09-28 RX ORDER — SODIUM CHLORIDE 0.9 % (FLUSH) 0.9 %
10 SYRINGE (ML) INJECTION EVERY 12 HOURS SCHEDULED
Status: DISCONTINUED | OUTPATIENT
Start: 2024-09-28 | End: 2024-10-03 | Stop reason: HOSPADM

## 2024-09-28 RX ORDER — ATORVASTATIN CALCIUM 20 MG/1
40 TABLET, FILM COATED ORAL NIGHTLY
Status: DISCONTINUED | OUTPATIENT
Start: 2024-09-28 | End: 2024-10-03 | Stop reason: HOSPADM

## 2024-09-28 RX ORDER — BISACODYL 10 MG
10 SUPPOSITORY, RECTAL RECTAL DAILY PRN
Status: DISCONTINUED | OUTPATIENT
Start: 2024-09-28 | End: 2024-10-03 | Stop reason: HOSPADM

## 2024-09-28 RX ORDER — PRASUGREL 10 MG/1
10 TABLET, FILM COATED ORAL DAILY
Status: DISCONTINUED | OUTPATIENT
Start: 2024-09-28 | End: 2024-09-28

## 2024-09-28 RX ORDER — ACETAMINOPHEN 160 MG/5ML
650 SOLUTION ORAL EVERY 4 HOURS PRN
Status: DISCONTINUED | OUTPATIENT
Start: 2024-09-28 | End: 2024-10-03 | Stop reason: HOSPADM

## 2024-09-28 RX ORDER — RANOLAZINE 500 MG/1
1000 TABLET, EXTENDED RELEASE ORAL 2 TIMES DAILY
Status: DISCONTINUED | OUTPATIENT
Start: 2024-09-28 | End: 2024-10-03 | Stop reason: HOSPADM

## 2024-09-28 RX ORDER — ACETAMINOPHEN 325 MG/1
650 TABLET ORAL EVERY 4 HOURS PRN
Status: DISCONTINUED | OUTPATIENT
Start: 2024-09-28 | End: 2024-10-03 | Stop reason: HOSPADM

## 2024-09-28 RX ORDER — SODIUM CHLORIDE 0.9 % (FLUSH) 0.9 %
10 SYRINGE (ML) INJECTION AS NEEDED
Status: DISCONTINUED | OUTPATIENT
Start: 2024-09-28 | End: 2024-10-03 | Stop reason: HOSPADM

## 2024-09-28 RX ORDER — SODIUM CHLORIDE 9 MG/ML
40 INJECTION, SOLUTION INTRAVENOUS AS NEEDED
Status: DISCONTINUED | OUTPATIENT
Start: 2024-09-28 | End: 2024-10-03 | Stop reason: HOSPADM

## 2024-09-28 RX ORDER — CARVEDILOL 6.25 MG/1
6.25 TABLET ORAL EVERY 12 HOURS SCHEDULED
Status: DISCONTINUED | OUTPATIENT
Start: 2024-09-28 | End: 2024-10-01

## 2024-09-28 RX ORDER — POTASSIUM CHLORIDE 750 MG/1
20 TABLET, FILM COATED, EXTENDED RELEASE ORAL DAILY
Status: DISCONTINUED | OUTPATIENT
Start: 2024-09-28 | End: 2024-10-01

## 2024-09-28 RX ORDER — PANTOPRAZOLE SODIUM 40 MG/1
40 TABLET, DELAYED RELEASE ORAL
Status: DISCONTINUED | OUTPATIENT
Start: 2024-09-29 | End: 2024-10-03 | Stop reason: HOSPADM

## 2024-09-28 RX ORDER — ISOSORBIDE MONONITRATE 60 MG/1
60 TABLET, EXTENDED RELEASE ORAL DAILY
Status: DISCONTINUED | OUTPATIENT
Start: 2024-09-28 | End: 2024-10-01

## 2024-09-28 RX ORDER — POLYETHYLENE GLYCOL 3350 17 G/17G
17 POWDER, FOR SOLUTION ORAL DAILY PRN
Status: DISCONTINUED | OUTPATIENT
Start: 2024-09-28 | End: 2024-10-03 | Stop reason: HOSPADM

## 2024-09-28 RX ADMIN — PRASUGREL 5 MG: 10 TABLET, FILM COATED ORAL at 18:26

## 2024-09-28 RX ADMIN — FUROSEMIDE 40 MG: 40 TABLET ORAL at 17:25

## 2024-09-28 RX ADMIN — ASPIRIN 81 MG: 81 TABLET, COATED ORAL at 17:25

## 2024-09-28 RX ADMIN — Medication 10 ML: at 09:26

## 2024-09-28 RX ADMIN — POTASSIUM CHLORIDE 20 MEQ: 750 TABLET, EXTENDED RELEASE ORAL at 17:25

## 2024-09-28 RX ADMIN — ONDANSETRON 4 MG: 2 INJECTION, SOLUTION INTRAMUSCULAR; INTRAVENOUS at 06:29

## 2024-09-28 RX ADMIN — FOLIC ACID 1 MG: 1 TABLET ORAL at 17:25

## 2024-09-28 RX ADMIN — ATORVASTATIN CALCIUM 40 MG: 20 TABLET, FILM COATED ORAL at 20:26

## 2024-09-28 RX ADMIN — ISOSORBIDE MONONITRATE 60 MG: 60 TABLET, EXTENDED RELEASE ORAL at 17:25

## 2024-09-28 RX ADMIN — TAMSULOSIN HYDROCHLORIDE 0.4 MG: 0.4 CAPSULE ORAL at 20:26

## 2024-09-28 RX ADMIN — Medication 10 ML: at 20:30

## 2024-09-28 RX ADMIN — RANOLAZINE 1000 MG: 500 TABLET, FILM COATED, EXTENDED RELEASE ORAL at 20:40
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
## 2024-09-28 NOTE — CASE MANAGEMENT/SOCIAL WORK
Discharge Planning Assessment  [facility]     Patient Name: Dale Mann  MRN: [id number]  Today's Date: 9/28/2024    Admit Date: 9/28/2024        Discharge Needs Assessment    No documentation.                  Discharge Plan       Row Name 09/28/24 5930       Plan    Plan Comments Spoke with patient and daughter at bedside regarding goals of care/discharge planning. Patient with dementia and majority of interview conducted with daughter, Satinder, who is his healthcare surrogate and is supportive and involved in patient's care. Per daughter, patient has been living with her for the past several weeks since discharging home from rehab, where he was cut from services by insurance due to not progressing. Daughter did appeal this discharge from rehab, but denail for continued rehab stay was upheld. Patient's daughter reports markedly diminished functional mobility and frequent falls in the home, feels as though he cannot safely return home in this condition. There are multiple steps within the home, as well as to enter the home, which patient is unable to navigate. Satinder expresses interest in long term care placement, and reports that she has been in contact with [facility], but has not had much luck in that process. ER provider updated and agreeable to admission for long term care placement. KEISHA Dominguez RN                  Continued Care and Services - Admitted Since 9/28/2024    No active coordination exists for this encounter.          Demographic Summary    No documentation.                  Functional Status    No documentation.                  Psychosocial    No documentation.                  Abuse/Neglect    No documentation.                  Legal    No documentation.                  Substance Abuse    No documentation.                  Patient Forms    No documentation.                     Will Cortez RN

## 2024-09-28 NOTE — ED NOTES
Nursing report ED to floor  Dale Mann  83 y.o.  male    HPI :  HPI (Adult)  Stated Reason for Visit: To ER via EMS from home.  Unwitnessed fall.  Pt states he hit his right side.  Pt denies injury.    Pt does have N/V this morning.  Pt has some confusion at baseline.      Pt is on Effient.  History Obtained From: patient, EMS  Precipitating Event(s): fall    Chief Complaint  Chief Complaint   Patient presents with    Fall    Vomiting       Admitting doctor:   Jose Mcgowan MD    Admitting diagnosis:   The primary encounter diagnosis was Frequent falls. Diagnoses of Unstable gait and Elevated troponin were also pertinent to this visit.    Code status:   Current Code Status       Date Active Code Status Order ID Comments User Context       Not on file            Allergies:   Norvasc [amlodipine besylate], Ticagrelor, Coumadin [warfarin sodium], Lipitor [atorvastatin], Clopidogrel, Enalapril, and Plavix [clopidogrel bisulfate]    Isolation:   No active isolations    Intake and Output  No intake or output data in the 24 hours ending 09/28/24 1308    Weight:       09/28/24  0734   Weight: 83 kg (183 lb)       Most recent vitals:   Vitals:    09/28/24 1031 09/28/24 1101 09/28/24 1131 09/28/24 1201   BP: 126/63 130/65 125/65 130/64   BP Location:       Patient Position:       Pulse: 68 68 69 68   Resp: 18 18 18 18   Temp:       SpO2: 92% 94% 91% 92%   Weight:       Height:           Active LDAs/IV Access:   Lines, Drains & Airways       Active LDAs       Name Placement date Placement time Site Days    Peripheral IV 09/28/24 0628 Left Antecubital 09/28/24 0628  Antecubital  less than 1                    Labs (abnormal labs have a star):   Labs Reviewed   COMPREHENSIVE METABOLIC PANEL - Abnormal; Notable for the following components:       Result Value    Glucose 106 (*)     All other components within normal limits    Narrative:     GFR Normal >60  Chronic Kidney Disease <60  Kidney Failure <15    The GFR  formula is only valid for adults with stable renal function between ages 18 and 70.   PROTIME-INR - Abnormal; Notable for the following components:    Protime 16.3 (*)     INR 1.28 (*)     All other components within normal limits   CBC WITH AUTO DIFFERENTIAL - Abnormal; Notable for the following components:    RBC 3.67 (*)     Hemoglobin 12.6 (*)     Hematocrit 36.8 (*)     .3 (*)     MCH 34.3 (*)     RDW-SD 55.7 (*)     Platelets 127 (*)     Lymphocyte % 14.6 (*)     Immature Grans % 1.0 (*)     Immature Grans, Absolute 0.06 (*)     All other components within normal limits   TROPONIN - Abnormal; Notable for the following components:    HS Troponin T 24 (*)     All other components within normal limits    Narrative:     High Sensitive Troponin T Reference Range:  <14.0 ng/L- Negative Female for AMI  <22.0 ng/L- Negative Male for AMI  >=14 - Abnormal Female indicating possible myocardial injury.  >=22 - Abnormal Male indicating possible myocardial injury.   Clinicians would have to utilize clinical acumen, EKG, Troponin, and serial changes to determine if it is an Acute Myocardial Infarction or myocardial injury due to an underlying chronic condition.        URINALYSIS W/ MICROSCOPIC IF INDICATED (NO CULTURE) - Abnormal; Notable for the following components:    Color, UA Dark Yellow (*)     Ketones, UA Trace (*)     Protein, UA 30 mg/dL (1+) (*)     Leuk Esterase, UA Trace (*)     Urobilinogen, UA 4.0 E.U./dL (*)     All other components within normal limits   APTT - Normal   LIPASE - Normal   MAGNESIUM - Normal   BNP (IN-HOUSE) - Normal    Narrative:     This assay is used as an aid in the diagnosis of individuals suspected of having heart failure. It can be used as an aid in the diagnosis of acute decompensated heart failure (ADHF) in patients presenting with signs and symptoms of ADHF to the emergency department (ED). In addition, NT-proBNP of <300 pg/mL indicates ADHF is not likely.    Age Range Result  Interpretation  NT-proBNP Concentration (pg/mL:      <50             Positive            >450                   Gray                 300-450                    Negative             <300    50-75           Positive            >900                  Gray                300-900                  Negative            <300      >75             Positive            >1800                  Gray                300-1800                  Negative            <300   HIGH SENSITIVITIY TROPONIN T 2HR - Normal    Narrative:     High Sensitive Troponin T Reference Range:  <14.0 ng/L- Negative Female for AMI  <22.0 ng/L- Negative Male for AMI  >=14 - Abnormal Female indicating possible myocardial injury.  >=22 - Abnormal Male indicating possible myocardial injury.   Clinicians would have to utilize clinical acumen, EKG, Troponin, and serial changes to determine if it is an Acute Myocardial Infarction or myocardial injury due to an underlying chronic condition.        URINALYSIS, MICROSCOPIC ONLY   CBC AND DIFFERENTIAL    Narrative:     The following orders were created for panel order CBC & Differential.  Procedure                               Abnormality         Status                     ---------                               -----------         ------                     CBC Auto Differential[[id number]]        Abnormal            Final result                 Please view results for these tests on the individual orders.       EKG:   ECG 12 Lead Chest Pain   Preliminary Result   HEART RATE=71  bpm   RR Xskdwadl=442  ms   DC Interval=73  ms   P Horizontal Axis=  deg   P Front Axis=26  deg   QRSD Vxifafuz=024  ms   QT Nizsdjad=982  ms   TWgC=347  ms   QRS Axis=-31  deg   T Wave Axis=66  deg   - ABNORMAL ECG -   Sinus rhythm   Short DC interval   Left bundle branch block   Date and Time of Study:2024-09-28 06:27:43      Telemetry Scan   Final Result      Telemetry Scan   Final Result          Meds given in ED:   Medications   sodium  "chloride 0.9 % flush 10 mL (10 mL Intravenous Given 24 2203)   ondansetron (ZOFRAN) injection 4 mg (4 mg Intravenous Given 24 8924)       Imaging results:  No radiology results for the last day    Ambulatory status:   - x1    Social issues:   Social History     Socioeconomic History    Marital status:    Tobacco Use    Smoking status: Former     Current packs/day: 0.00     Average packs/day: 3.0 packs/day for 25.0 years (75.0 ttl pk-yrs)     Types: Cigarettes     Start date:      Quit date:      Years since quittin.7    Smokeless tobacco: Never   Vaping Use    Vaping status: Never Used   Substance and Sexual Activity    Alcohol use: Yes     Comment: 2021 - \"Socially\".  \"Preferrably Wine\".    Drug use: No    Sexual activity: Defer       Peripheral Neurovascular  Peripheral Neurovascular (Adult)  Peripheral Neurovascular WDL: .WDL except  Additional Documentation: Edema (Group)  Edema  Edema: foot, left, foot, right  Foot, Left Edema: 1+ (Trace)  Foot, Right Edema: 1+ (Trace)    Neuro Cognitive  Neuro Cognitive (Adult)  Cognitive/Neuro/Behavioral WDL: WDL  Orientation: oriented x 4    Learning  Learning Assessment (Adult)  Learning Readiness and Ability: cognitive limitation noted  Education Provided  Person Taught: patient, family member/friend    Respiratory  Respiratory WDL  Respiratory WDL: .WDL except, all  Rhythm/Pattern, Respiratory: shortness of breath, depth regular, pattern regular, unlabored    Abdominal Pain       Pain Assessments  Pain (Adult)  (0-10) Pain Rating: Rest: 0    NIH Stroke Scale       Paulie Zhou RN  24 13:08 EDT   "
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
## 2024-09-28 NOTE — ED PROVIDER NOTES
MD ATTESTATION NOTE     SHARED VISIT: This visit was performed by BOTH a physician and an APC. The substantive portion of the medical decision making was performed by this attesting physician who made or approved the management plan and takes responsibility for patient management. All studies in the APC note (if performed) were independently interpreted by me.  The GIBSON and I have discussed this patient's history, physical exam, and treatment plan. I have reviewed the documentation and personally had a face to face interaction with the patient. I affirm the documentation and agree with the treatment and plan. I provided a substantive portion of the care of the patient.  I personally performed the physical exam in its entirety, and below are my findings.      Brief HPI: Patient presents to the ED from home by EMS after an acute fall.  Daughter reports the patient has had frequent falls for the past 2 years.  He fell this morning while trying to go to the bathroom.  He was then too weak to stand up even with assistance.  After EMS arrived, they helped get him off the floor.  Patient then had an episode of vomiting and complained of chest discomfort.  Patient denies headache, neck pain, shortness of breath, abdominal pain, or extremity pain.  He is on Effient.    PHYSICAL EXAM    GENERAL: Awake and alert.  Nontoxic-appearing elderly male.  No acute distress  HENT: nares patent, NCAT  EYES: no scleral icterus  CV: regular rhythm, normal rate  RESPIRATORY: normal effort, CTAB  ABDOMEN: soft, nontender  MUSCULOSKELETAL: Extremities are nontender with full range of motion  NEURO: alert, moves all extremities, follows commands  PSYCH:  calm, cooperative  SKIN: warm, dry    Vital signs and nursing notes reviewed.        Plan: Patient presents to the ED after an unwitnessed fall.  He then had an episode of vomiting and chest pain.  He is currently asymptomatic.  Will obtain labs and imaging studies for further  evaluation.    Chest x-ray personally interpreted by me.  My personal interpretation is: Heart is mildly enlarged.  Lungs are clear.  No pleural effusion.    EKG personally interpreted by me.  My personal interpretation is:         EKG time: 6:27 AM  Rhythm/Rate: Sinus rhythm, rate 71  P waves and MS: Short MS interval  QRS, axis: Normal axis, LBBB  ST and T waves: Nonspecific ST/T wave changes in the lateral leads  QT interval: Normal    Interpreted Contemporaneously by me, independently viewed  No prior available for comparison     ED Course as of 09/28/24 1305   Sat Sep 28, 2024   0659 Per my independent interpretation of the chest x-ray, there is no obvious pneumothorax. [DC]   0807 Magnesium: 2.3 [WH]   0807 proBNP: 577.0 [WH]   0807 Lipase: 30 [WH]   0807 Glucose(!): 106 [WH]   0807 BUN: 21 [WH]   0807 Creatinine: 1.06 [WH]   0807 WBC: 6.16 [WH]   0807 Hemoglobin(!): 12.6 [WH]   0807 HS Troponin T(!): 24 [WH]   0914 I spoke with care management at this time who recommends that we wait until the ER care manager arrives later this morning.  Will with [facility] has already been made aware of the case. [DC]   1004 HS Troponin T: 21  Delta -3 [WH]   1026 HS Troponin T: 21 [DC]   1026 Troponin T Delta: -3 [DC]   1227 Patient presentation and evaluation consistent with gait instability from uncertain etiology.  I do suspect that he is progressing with a chronic disease process and has no new acute findings.  He will require admission to the hospital because he is unable to safely ambulate even with a walker and has very little assistance at home.  [facility] has spoken at length with the daughter and recommended that we keep him in the hospital under observation for long-term facility placement.  Patient and family agreeable.  All questions answered. [DC]   1301 I discussed the case with MD Roslyn with [facility] at this time regarding the patient.  I discussed work-up, results, concerns.  I discussed the consulting provider's  desire for med surg admit.   [DC]      ED Course User Index  [DC] Braulio Rodriguez PA  [WH] Golden Garcia MD     MDM: Patient presented the ED after falling.  He has a history of frequent falls.  He is currently living his daughter after being discharged from a rehab facility.  ED workup was basically unremarkable.  Initial troponin was 24 but repeat had a delta of -3.  EKG did not have any acute ischemic changes.  He was unable to ambulate.  He will be admitted.         Golden Garcia MD  09/28/24 [id number]

## 2024-09-28 NOTE — ED NOTES
To ER via EMS from home.  Unwitnessed fall.  Pt states he hit his right side.  Pt denies injury,   Pt does have N/V this morning.  Pt has some confusion at baseline.      Pt is on Effient.

## 2024-09-29 ENCOUNTER — APPOINTMENT (OUTPATIENT)
Dept: NEUROLOGY | Facility: HOSPITAL | Age: 84
DRG: 312 | End: 2024-09-29
Payer: MEDICARE

## 2024-09-29 LAB
ANION GAP SERPL CALCULATED.3IONS-SCNC: 7 MMOL/L (ref 5–15)
BASOPHILS # BLD AUTO: 0.01 10*3/MM3 (ref 0–0.2)
BASOPHILS NFR BLD AUTO: 0.2 % (ref 0–1.5)
BUN SERPL-MCNC: 19 MG/DL (ref 8–23)
BUN/CREAT SERPL: 13.7 (ref 7–25)
CALCIUM SPEC-SCNC: 8.7 MG/DL (ref 8.6–10.5)
CHLORIDE SERPL-SCNC: 105 MMOL/L (ref 98–107)
CHROMATIN AB SERPL-ACNC: 10.4 IU/ML (ref 0–14)
CK SERPL-CCNC: 46 U/L (ref 20–200)
CO2 SERPL-SCNC: 27 MMOL/L (ref 22–29)
CREAT SERPL-MCNC: 1.39 MG/DL (ref 0.76–1.27)
CREAT UR-MCNC: 64.7 MG/DL
DEPRECATED RDW RBC AUTO: 57 FL (ref 37–54)
EGFRCR SERPLBLD CKD-EPI 2021: 50.9 ML/MIN/1.73
EOSINOPHIL # BLD AUTO: 0.11 10*3/MM3 (ref 0–0.4)
EOSINOPHIL NFR BLD AUTO: 2 % (ref 0.3–6.2)
ERYTHROCYTE [DISTWIDTH] IN BLOOD BY AUTOMATED COUNT: 15.2 % (ref 12.3–15.4)
FOLATE SERPL-MCNC: 15.4 NG/ML (ref 4.78–24.2)
GLUCOSE SERPL-MCNC: 77 MG/DL (ref 65–99)
HBA1C MFR BLD: 4.6 % (ref 4.8–5.6)
HCT VFR BLD AUTO: 32.2 % (ref 37.5–51)
HGB BLD-MCNC: 10.4 G/DL (ref 13–17.7)
IMM GRANULOCYTES # BLD AUTO: 0.03 10*3/MM3 (ref 0–0.05)
IMM GRANULOCYTES NFR BLD AUTO: 0.5 % (ref 0–0.5)
LYMPHOCYTES # BLD AUTO: 1.65 10*3/MM3 (ref 0.7–3.1)
LYMPHOCYTES NFR BLD AUTO: 30.2 % (ref 19.6–45.3)
MCH RBC QN AUTO: 33.2 PG (ref 26.6–33)
MCHC RBC AUTO-ENTMCNC: 32.3 G/DL (ref 31.5–35.7)
MCV RBC AUTO: 102.9 FL (ref 79–97)
MONOCYTES # BLD AUTO: 0.6 10*3/MM3 (ref 0.1–0.9)
MONOCYTES NFR BLD AUTO: 11 % (ref 5–12)
NEUTROPHILS NFR BLD AUTO: 3.07 10*3/MM3 (ref 1.7–7)
NEUTROPHILS NFR BLD AUTO: 56.1 % (ref 42.7–76)
NRBC BLD AUTO-RTO: 0 /100 WBC (ref 0–0.2)
PHOSPHATE SERPL-MCNC: 2.7 MG/DL (ref 2.5–4.5)
PLATELET # BLD AUTO: 107 10*3/MM3 (ref 140–450)
PMV BLD AUTO: 8.4 FL (ref 6–12)
POTASSIUM SERPL-SCNC: 4.5 MMOL/L (ref 3.5–5.2)
RBC # BLD AUTO: 3.13 10*6/MM3 (ref 4.14–5.8)
SODIUM SERPL-SCNC: 139 MMOL/L (ref 136–145)
SODIUM UR-SCNC: 33 MMOL/L
TSH SERPL DL<=0.05 MIU/L-ACNC: 2.91 UIU/ML (ref 0.27–4.2)
VIT B12 BLD-MCNC: 417 PG/ML (ref 211–946)
WBC NRBC COR # BLD AUTO: 5.47 10*3/MM3 (ref 3.4–10.8)

## 2024-09-29 PROCEDURE — 99222 1ST HOSP IP/OBS MODERATE 55: CPT | Performed by: STUDENT IN AN ORGANIZED HEALTH CARE EDUCATION/TRAINING PROGRAM

## 2024-09-29 PROCEDURE — 86038 ANTINUCLEAR ANTIBODIES: CPT | Performed by: STUDENT IN AN ORGANIZED HEALTH CARE EDUCATION/TRAINING PROGRAM

## 2024-09-29 PROCEDURE — 84100 ASSAY OF PHOSPHORUS: CPT | Performed by: HOSPITALIST

## 2024-09-29 PROCEDURE — 86592 SYPHILIS TEST NON-TREP QUAL: CPT | Performed by: STUDENT IN AN ORGANIZED HEALTH CARE EDUCATION/TRAINING PROGRAM

## 2024-09-29 PROCEDURE — 83036 HEMOGLOBIN GLYCOSYLATED A1C: CPT | Performed by: STUDENT IN AN ORGANIZED HEALTH CARE EDUCATION/TRAINING PROGRAM

## 2024-09-29 PROCEDURE — 82595 ASSAY OF CRYOGLOBULIN: CPT | Performed by: STUDENT IN AN ORGANIZED HEALTH CARE EDUCATION/TRAINING PROGRAM

## 2024-09-29 PROCEDURE — 86160 COMPLEMENT ANTIGEN: CPT | Performed by: STUDENT IN AN ORGANIZED HEALTH CARE EDUCATION/TRAINING PROGRAM

## 2024-09-29 PROCEDURE — 95816 EEG AWAKE AND DROWSY: CPT

## 2024-09-29 PROCEDURE — 84300 ASSAY OF URINE SODIUM: CPT | Performed by: INTERNAL MEDICINE

## 2024-09-29 PROCEDURE — 82550 ASSAY OF CK (CPK): CPT | Performed by: HOSPITALIST

## 2024-09-29 PROCEDURE — 86431 RHEUMATOID FACTOR QUANT: CPT | Performed by: STUDENT IN AN ORGANIZED HEALTH CARE EDUCATION/TRAINING PROGRAM

## 2024-09-29 PROCEDURE — 84443 ASSAY THYROID STIM HORMONE: CPT | Performed by: HOSPITALIST

## 2024-09-29 PROCEDURE — 86235 NUCLEAR ANTIGEN ANTIBODY: CPT | Performed by: STUDENT IN AN ORGANIZED HEALTH CARE EDUCATION/TRAINING PROGRAM

## 2024-09-29 PROCEDURE — 82746 ASSAY OF FOLIC ACID SERUM: CPT | Performed by: HOSPITALIST

## 2024-09-29 PROCEDURE — 82607 VITAMIN B-12: CPT | Performed by: HOSPITALIST

## 2024-09-29 PROCEDURE — 95819 EEG AWAKE AND ASLEEP: CPT | Performed by: STUDENT IN AN ORGANIZED HEALTH CARE EDUCATION/TRAINING PROGRAM

## 2024-09-29 PROCEDURE — 80048 BASIC METABOLIC PNL TOTAL CA: CPT | Performed by: HOSPITALIST

## 2024-09-29 PROCEDURE — 85025 COMPLETE CBC W/AUTO DIFF WBC: CPT | Performed by: HOSPITALIST

## 2024-09-29 PROCEDURE — 25810000003 SODIUM CHLORIDE 0.9 % SOLUTION

## 2024-09-29 PROCEDURE — 82570 ASSAY OF URINE CREATININE: CPT | Performed by: INTERNAL MEDICINE

## 2024-09-29 RX ORDER — SODIUM CHLORIDE 9 MG/ML
50 INJECTION, SOLUTION INTRAVENOUS CONTINUOUS
Status: ACTIVE | OUTPATIENT
Start: 2024-09-29 | End: 2024-09-29

## 2024-09-29 RX ADMIN — FOLIC ACID 1 MG: 1 TABLET ORAL at 08:51

## 2024-09-29 RX ADMIN — RANOLAZINE 1000 MG: 500 TABLET, FILM COATED, EXTENDED RELEASE ORAL at 20:51

## 2024-09-29 RX ADMIN — Medication 10 ML: at 08:52

## 2024-09-29 RX ADMIN — RANOLAZINE 1000 MG: 500 TABLET, FILM COATED, EXTENDED RELEASE ORAL at 08:51

## 2024-09-29 RX ADMIN — CARVEDILOL 6.25 MG: 6.25 TABLET, FILM COATED ORAL at 23:45

## 2024-09-29 RX ADMIN — POTASSIUM CHLORIDE 20 MEQ: 750 TABLET, EXTENDED RELEASE ORAL at 08:51

## 2024-09-29 RX ADMIN — TAMSULOSIN HYDROCHLORIDE 0.4 MG: 0.4 CAPSULE ORAL at 20:51

## 2024-09-29 RX ADMIN — Medication 10 ML: at 20:52

## 2024-09-29 RX ADMIN — ATORVASTATIN CALCIUM 40 MG: 20 TABLET, FILM COATED ORAL at 20:54

## 2024-09-29 RX ADMIN — SODIUM CHLORIDE 50 ML/HR: 9 INJECTION, SOLUTION INTRAVENOUS at 01:02

## 2024-09-29 RX ADMIN — ASPIRIN 81 MG: 81 TABLET, COATED ORAL at 08:51

## 2024-09-29 RX ADMIN — PRASUGREL 5 MG: 10 TABLET, FILM COATED ORAL at 08:51

## 2024-09-29 RX ADMIN — PANTOPRAZOLE SODIUM 40 MG: 40 TABLET, DELAYED RELEASE ORAL at 05:20

## 2024-09-29 NOTE — PLAN OF CARE
Goal Outcome Evaluation:  Plan of Care Reviewed With: patient, daughter        Progress: no change  Outcome Evaluation: Patient admitted yesterday from the ER. Admitted for frequent falls. Orthostatic blood pressures negative. Nephrology and cardiology  consulted. EEG preformed. MRI ordered, cannot be completed due to the bolt/plate in his head that was placed so long ago, not sure what it is made of and not attached to bone, ordering MD aware. Urine sample obtained. Echo ordered, needs to be completed. Daughter at bedside throughout the shift. Patient expresses no other needs at this time. Call light within reach. Patient expresses no other needs at this time. Call light within reach.

## 2024-09-29 NOTE — SIGNIFICANT NOTE
09/29/24 1113   OTHER   Discipline occupational therapist   Rehab Time/Intention   Session Not Performed other (see comments)  (RN stated BP is too low for treatment today.  OT will follow-up tomorrow.)   Therapy Assessment/Plan (PT)   Criteria for Skilled Interventions Met (PT) yes;skilled treatment is necessary   Recommendation   OT - Next Appointment 09/30/24

## 2024-09-29 NOTE — SIGNIFICANT NOTE
09/29/24 1001   OTHER   Discipline physical therapist   Rehab Time/Intention   Session Not Performed unable to evaluate, medical status change  (RN reports BP too low and patient dizzy. Wait till tomorrow)   Therapy Assessment/Plan (PT)   Criteria for Skilled Interventions Met (PT) yes;skilled treatment is necessary   Recommendation   PT - Next Appointment 09/30/24        14.7

## 2024-09-29 NOTE — PROGRESS NOTES
Name: Dale Mann ADMIT: 2024   : 1940  PCP: Sebastian Larose MD    MRN: [id number] LOS: 0 days   AGE/SEX: 83 y.o. male  ROOM: [facility]     Subjective   Subjective   Patient is lying on the bed and is chronically ill-appearing.  Denies nausea, vomiting, chest pain, shortness of breath, dizziness.       Objective   Objective   Vital Signs  Temp:  [97.9 °F (36.6 °C)-98.4 °F (36.9 °C)] 97.9 °F (36.6 °C)  Heart Rate:  [56-78] 70  Resp:  [16-22] 20  BP: ()/(35-77) 107/54  SpO2:  [93 %-100 %] 96 %  on   ;   Device (Oxygen Therapy): room air  Body mass index is 27.83 kg/m².  Physical Exam  HEENT: PERRLA, extraocular movements intact, Scleras no icterus  Neck: Supple, no JVD  Cardiovascular: Regular rate and rhythm with normal S1 and S2  Respiratory: Fairly clear to auscultation bilaterally with no wheezes  GI: Soft, nontender, bowel sounds are present  Extremities: No edema, palpable pedal pulses  Neurologic: Grossly nonfocal, no facial asymmetry      Results Review     I reviewed the patient's new clinical results.  Results from last 7 days   Lab Units 24  0447 24  0626   WBC 10*3/mm3 5.47 6.16   HEMOGLOBIN g/dL 10.4* 12.6*   PLATELETS 10*3/mm3 107* 127*     Results from last 7 days   Lab Units 24  0446 24  0711   SODIUM mmol/L 139 136   POTASSIUM mmol/L 4.5 4.7   CHLORIDE mmol/L 105 102   CO2 mmol/L 27.0 26.7   BUN mg/dL 19 21   CREATININE mg/dL 1.39* 1.06   GLUCOSE mg/dL 77 106*   EGFR mL/min/1.73 50.9* 70.5     Results from last 7 days   Lab Units 24  0711   ALBUMIN g/dL 3.5   BILIRUBIN mg/dL 0.6   ALK PHOS U/L 75   AST (SGOT) U/L 15   ALT (SGPT) U/L 11     Results from last 7 days   Lab Units 24  0446 24  0711   CALCIUM mg/dL 8.7 8.9   ALBUMIN g/dL  --  3.5   MAGNESIUM mg/dL  --  2.3   PHOSPHORUS mg/dL 2.7  --        Hemoglobin A1C   Date/Time Value Ref Range Status   2024 1417 4.60 (L) 4.80 - 5.60 % Final       No radiology results  for the last day    I have personally reviewed all medications:  Scheduled Medications  aspirin, 81 mg, Oral, Daily  atorvastatin, 40 mg, Oral, Nightly  carvedilol, 6.25 mg, Oral, Q12H  folic acid, 1 mg, Oral, Daily  furosemide, 40 mg, Oral, Daily  isosorbide mononitrate, 60 mg, Oral, Daily  pantoprazole, 40 mg, Oral, Q AM  potassium chloride, 20 mEq, Oral, Daily  prasugrel, 5 mg, Oral, Daily  ranolazine, 1,000 mg, Oral, BID  sodium chloride, 10 mL, Intravenous, Q12H  tamsulosin, 0.4 mg, Oral, Nightly    Infusions   Diet  Diet: Regular/House, Cardiac; Healthy Heart (2-3 Na+); Fluid Consistency: Thin (IDDSI 0)    I have personally reviewed:  [x]  Laboratory   [x]  Microbiology   [x]  Radiology   [x]  EKG/Telemetry  [x]  Cardiology/Vascular   []  Pathology    []  Records       Assessment/Plan     Active Hospital Problems    Diagnosis  POA    **Frequent falls [R29.6]  Not Applicable      Resolved Hospital Problems   No resolved problems to display.       83 y.o. male admitted with Frequent falls.    #1 frequent falls/orthostasis/recurrent syncope, currently blood pressure is soft to low therefore Coreg, Lasix, Imdur is being held.  Will gently volume resuscitate and 2D echo will be checked and cardiology consultation will also be obtained.  2.  Neuropathy, extensive serologic workup has been initiated by neurology and tested negative for HIV and hepatitis.  TSH level, B12 and folate levels are normal.  Neurology did evaluate and ordered MRI of the cervical and thoracic spine.  EEG to rule out seizure activity given CT findings of left frontal encephalomalacia.  3.  Chronic heart failure, currently appears to be concentrated and secondary to soft low blood pressures Coreg and Lasix have been held.  4.  Coronary artery disease, currently chest pain-free and is on aspirin, Effient, Ranexa will be continued.  Held Coreg, isosorbide.  5. Macrocytic anemia, again B12 and folate levels are normal.  No evidence of any active  bleeding and will follow-up with hematology as an outpatient basis.  6.  BPH, on Flomax.  7. Acute kidney injury, likely due to hypoperfusion and monitor ins and outs closely.  Nephrology consult will also be obtained.  8.  On SCDs for DVT prophylaxis.  9.  CODE STATUS is full code.    Estimated discharge date, 10-2-24    Expected discharge date/ time has not been documented.      August Griffin MD  [facility]  09/29/24  15:44 EDT

## 2024-09-29 NOTE — CONSULTS
Neurology Consult Note    Consult Date: 9/29/2024    Referring MD: Sebastian Larose, *    Reason for Consult I have been asked to see the patient in neurological consultation to render advice and opinion regarding frequent falls    Dale Mann is a 83 y.o. male with known diagnosis of coronary artery disease status post CABG and multiple stents, cardiomyopathy, mixed hyperlipidemia, hypertension, arthritis scented to the hospital after an episode consulted for frequent falls.  Patient stated that he has been falling for the past couple years, he stated that prior to he would feel lightheaded/dizzy prior to the fall and he feels his legs would give out on him, complaining of chronic neck pain, he denied a prior history of strokes, denied history of diabetes or peripheral neuropathies, he stated that his blood pressure usually running low but he does not know why.  This admission he had a few labs that showed normal folate, normal CK, normal B12, normal TSH and phosphorus, he had neuropathy labs that showed negative HIV, nonreactive hepatitis C antibodies on exam he had increased reflexes throughout with positive Kanwal sign on the right upper extremity, his tone was normal with no cogwheel rigidity, denied resting tremor, no constipation or hypophonia to suggest Parkinson disease.  Patient drinks alcohol socially and denied binge drinking.  Orthostatics checked on this admission and they were negative but his blood pressure at 70s to 90 systolic.  Past Medical History:   Diagnosis Date    Acid reflux     Arthritis     Chronic anemia     Chronic gastritis     Coronary arteriosclerosis     hx of CABG then stent placement,  is followed by dr pickard in [place]    Coronary artery disease of bypass graft of native heart with stable angina pectoris 4/11/2019    Diverticulosis of sigmoid colon     Dysuria     Essential hypertension     Fever     Hematochezia     High cholesterol     History of peptic  "ulcer     [place] (hard of hearing)     wearing heading aids david    Hyperkalemia     Macular degeneration     left eye only    Old myocardial infarction     Other specified anemias     Screening for malignant neoplasm of prostate     Upper respiratory infection     Urinary tract infectious disease        Exam  BP (!) 89/38   Pulse 56   Temp 98.4 °F (36.9 °C) (Oral)   Resp 16   Ht 172.7 cm (68\")   Wt 83 kg (183 lb)   SpO2 93%   BMI 27.83 kg/m²   Gen: NAD, vitals reviewed  MS: oriented x3, recent/remote memory intact, normal attention/concentration, language intact, no neglect.  CN: visual acuity grossly normal, PERRL, EOMI, no facial droop, no dysarthria  Motor: 5/5 throughout upper and lower extremities, normal tone  Sensory exam: Decreased to light touch, pain, temperature and vibration on lower extremities up to the knees, normal on the upper extremities.  Reflexes: 3+ on the right side, positive Kanwal sign on the right upper extremity, no clonus      DATA:    Lab Results   Component Value Date    GLUCOSE 77 09/29/2024    CALCIUM 8.7 09/29/2024     09/29/2024    K 4.5 09/29/2024    CO2 27.0 09/29/2024     09/29/2024    BUN 19 09/29/2024    CREATININE 1.39 (H) 09/29/2024    EGFRIFNONA 62 07/20/2021    BCR 13.7 09/29/2024    ANIONGAP 7.0 09/29/2024     Lab Results   Component Value Date    WBC 5.47 09/29/2024    HGB 10.4 (L) 09/29/2024    HCT 32.2 (L) 09/29/2024    .9 (H) 09/29/2024     (L) 09/29/2024       Lab review: normal folate, normal CK, normal B12, normal TSH and phosphorus, he had neuropathy labs that showed negative HIV, nonreactive hepatitis C antibodies.    Imaging review: I personally reviewed his CT scan of the head which showed large left frontal encephalomalacia with a prior craniectomy, no acute finding.    Diagnoses:  -Frequent falls sometimes preceded by lightheaded and followed by loss of consciousness, rule out seizures giving the prior left frontal " "encephalomalacia.  -Peripheral neuropathy  -CAD with remote CABG and stents  -Neck pain    PLAN:   -MRI C-spine and T-spine to rule out spinal stenosis  -Check neuropathy labs including cryoglobulins, Sjogren's syndrome SSA, SSB, DRISS, C3 and C4 complement, rheumatoid factor, A1c, RPR.  HIV checked and was negative, B12 was normal, folate was normal, hepatitis C not reactive, TSH normal.  -EEG to rule out seizure activity given her CT finding of left frontal encephalomalacia  -Further testing if seems necessary after the above lab results    Medical Decision Making for this neurology consultation consists of the following:  Review of previous chart, including H/P, provider and nursing notes as applicable.  Review of medications and vital signs.  Review of previous labs, neuroimaging, and additional relevant diagnostics.   Interpretation of laboratory, imaging, and other diagnostic results  Discussion with other providers and family   Total face-to-face/floor time: 30-75 minutes.     \"Dictated utilizing Dragon dictation\".     "

## 2024-09-29 NOTE — PLAN OF CARE
Goal Outcome Evaluation:  Plan of Care Reviewed With: patient        Progress: no change  Outcome Evaluation: Pt has been AOX4. Pleasant and cooperative. Pt has no c/o pain or discomfort. Pt BP has been low most the night. Coreg held d/t BP. Pt dizzy at times when standing up to urinat. Advised patient to not go to the toilet and to use urinal only since BP was low. LHA ordered a 200 bolus over 4hours. PB little better but low. CTM, safety maintained.

## 2024-09-30 ENCOUNTER — APPOINTMENT (OUTPATIENT)
Dept: CARDIOLOGY | Facility: HOSPITAL | Age: 84
DRG: 312 | End: 2024-09-30
Payer: MEDICARE

## 2024-09-30 PROBLEM — G62.9 NEUROPATHY: Status: ACTIVE | Noted: 2024-09-30

## 2024-09-30 PROBLEM — I50.9 CHF (CONGESTIVE HEART FAILURE): Status: ACTIVE | Noted: 2024-09-30

## 2024-09-30 PROBLEM — N17.9 AKI (ACUTE KIDNEY INJURY): Status: ACTIVE | Noted: 2024-09-30

## 2024-09-30 PROBLEM — I95.1 ORTHOSTATIC HYPOTENSION: Status: ACTIVE | Noted: 2024-09-30

## 2024-09-30 LAB
ANION GAP SERPL CALCULATED.3IONS-SCNC: 7 MMOL/L (ref 5–15)
AORTIC DIMENSIONLESS INDEX: 0.9 (DI)
ASCENDING AORTA: 3.7 CM
BASOPHILS # BLD AUTO: 0.02 10*3/MM3 (ref 0–0.2)
BASOPHILS NFR BLD AUTO: 0.4 % (ref 0–1.5)
BH CV ECHO MEAS - ACS: 1.61 CM
BH CV ECHO MEAS - AO MAX PG: 6.8 MMHG
BH CV ECHO MEAS - AO MEAN PG: 3.3 MMHG
BH CV ECHO MEAS - AO ROOT DIAM: 3.2 CM
BH CV ECHO MEAS - AO V2 MAX: 130.1 CM/SEC
BH CV ECHO MEAS - AO V2 VTI: 29.5 CM
BH CV ECHO MEAS - AVA(I,D): 2.48 CM2
BH CV ECHO MEAS - EDV(CUBED): 125.7 ML
BH CV ECHO MEAS - EDV(MOD-SP2): 114 ML
BH CV ECHO MEAS - EDV(MOD-SP4): 127 ML
BH CV ECHO MEAS - EF(MOD-BP): 49.3 %
BH CV ECHO MEAS - EF(MOD-SP2): 52.6 %
BH CV ECHO MEAS - EF(MOD-SP4): 45.7 %
BH CV ECHO MEAS - ESV(CUBED): 56.5 ML
BH CV ECHO MEAS - ESV(MOD-SP2): 54 ML
BH CV ECHO MEAS - ESV(MOD-SP4): 69 ML
BH CV ECHO MEAS - FS: 23.4 %
BH CV ECHO MEAS - IVS/LVPW: 1.26 CM
BH CV ECHO MEAS - IVSD: 1.01 CM
BH CV ECHO MEAS - LAT PEAK E' VEL: 8.1 CM/SEC
BH CV ECHO MEAS - LV DIASTOLIC VOL/BSA (35-75): 64.5 CM2
BH CV ECHO MEAS - LV MASS(C)D: 161.2 GRAMS
BH CV ECHO MEAS - LV MAX PG: 5.6 MMHG
BH CV ECHO MEAS - LV MEAN PG: 2.18 MMHG
BH CV ECHO MEAS - LV SYSTOLIC VOL/BSA (12-30): 35.1 CM2
BH CV ECHO MEAS - LV V1 MAX: 118.5 CM/SEC
BH CV ECHO MEAS - LV V1 VTI: 27 CM
BH CV ECHO MEAS - LVIDD: 5 CM
BH CV ECHO MEAS - LVIDS: 3.8 CM
BH CV ECHO MEAS - LVOT AREA: 2.7 CM2
BH CV ECHO MEAS - LVOT DIAM: 1.86 CM
BH CV ECHO MEAS - LVPWD: 0.81 CM
BH CV ECHO MEAS - MED PEAK E' VEL: 2.9 CM/SEC
BH CV ECHO MEAS - MR MAX PG: 109.4 MMHG
BH CV ECHO MEAS - MR MAX VEL: 523 CM/SEC
BH CV ECHO MEAS - MV A DUR: 0.16 SEC
BH CV ECHO MEAS - MV A MAX VEL: 104.8 CM/SEC
BH CV ECHO MEAS - MV DEC SLOPE: 485.6 CM/SEC2
BH CV ECHO MEAS - MV DEC TIME: 0.16 SEC
BH CV ECHO MEAS - MV E MAX VEL: 106 CM/SEC
BH CV ECHO MEAS - MV E/A: 1.01
BH CV ECHO MEAS - MV MAX PG: 5.3 MMHG
BH CV ECHO MEAS - MV MEAN PG: 2.17 MMHG
BH CV ECHO MEAS - MV P1/2T: 70.4 MSEC
BH CV ECHO MEAS - MV V2 VTI: 40.6 CM
BH CV ECHO MEAS - MVA(P1/2T): 3.1 CM2
BH CV ECHO MEAS - MVA(VTI): 1.8 CM2
BH CV ECHO MEAS - PA ACC TIME: 0.2 SEC
BH CV ECHO MEAS - PA V2 MAX: 75.6 CM/SEC
BH CV ECHO MEAS - QP/QS: 0.62
BH CV ECHO MEAS - RAP SYSTOLE: 3 MMHG
BH CV ECHO MEAS - RV MAX PG: 1.23 MMHG
BH CV ECHO MEAS - RV V1 MAX: 55.5 CM/SEC
BH CV ECHO MEAS - RV V1 VTI: 15.6 CM
BH CV ECHO MEAS - RVOT DIAM: 1.92 CM
BH CV ECHO MEAS - RVSP: 31 MMHG
BH CV ECHO MEAS - SV(LVOT): 73 ML
BH CV ECHO MEAS - SV(MOD-SP2): 60 ML
BH CV ECHO MEAS - SV(MOD-SP4): 58 ML
BH CV ECHO MEAS - SV(RVOT): 45.3 ML
BH CV ECHO MEAS - SVI(LVOT): 37.1 ML/M2
BH CV ECHO MEAS - SVI(MOD-SP2): 30.5 ML/M2
BH CV ECHO MEAS - SVI(MOD-SP4): 29.5 ML/M2
BH CV ECHO MEAS - TAPSE (>1.6): 1.65 CM
BH CV ECHO MEAS - TR MAX PG: 28 MMHG
BH CV ECHO MEAS - TR MAX VEL: 264.4 CM/SEC
BH CV ECHO MEASUREMENTS AVERAGE E/E' RATIO: 19.27
BH CV XLRA - RV BASE: 3.3 CM
BH CV XLRA - RV LENGTH: 7.5 CM
BH CV XLRA - RV MID: 3.5 CM
BH CV XLRA - TDI S': 7.1 CM/SEC
BILIRUB UR QL STRIP: NEGATIVE
BUN SERPL-MCNC: 19 MG/DL (ref 8–23)
BUN/CREAT SERPL: 14.4 (ref 7–25)
CALCIUM SPEC-SCNC: 8.8 MG/DL (ref 8.6–10.5)
CHLORIDE SERPL-SCNC: 104 MMOL/L (ref 98–107)
CHLORIDE UR-SCNC: <60 MMOL/L
CK SERPL-CCNC: 43 U/L (ref 20–200)
CLARITY UR: CLEAR
CO2 SERPL-SCNC: 25 MMOL/L (ref 22–29)
COLOR UR: YELLOW
CREAT SERPL-MCNC: 1.32 MG/DL (ref 0.76–1.27)
CREAT UR-MCNC: 36.2 MG/DL
DEPRECATED RDW RBC AUTO: 56.1 FL (ref 37–54)
EGFRCR SERPLBLD CKD-EPI 2021: 54.2 ML/MIN/1.73
EOSINOPHIL # BLD AUTO: 0.11 10*3/MM3 (ref 0–0.4)
EOSINOPHIL NFR BLD AUTO: 2.1 % (ref 0.3–6.2)
ERYTHROCYTE [DISTWIDTH] IN BLOOD BY AUTOMATED COUNT: 15 % (ref 12.3–15.4)
GLUCOSE SERPL-MCNC: 81 MG/DL (ref 65–99)
GLUCOSE UR STRIP-MCNC: NEGATIVE MG/DL
HCT VFR BLD AUTO: 32.4 % (ref 37.5–51)
HGB BLD-MCNC: 11.1 G/DL (ref 13–17.7)
HGB UR QL STRIP.AUTO: NEGATIVE
IMM GRANULOCYTES # BLD AUTO: 0.05 10*3/MM3 (ref 0–0.05)
IMM GRANULOCYTES NFR BLD AUTO: 1 % (ref 0–0.5)
KETONES UR QL STRIP: NEGATIVE
LEFT ATRIUM VOLUME INDEX: 37.7 ML/M2
LEUKOCYTE ESTERASE UR QL STRIP.AUTO: NEGATIVE
LYMPHOCYTES # BLD AUTO: 1.7 10*3/MM3 (ref 0.7–3.1)
LYMPHOCYTES NFR BLD AUTO: 32.9 % (ref 19.6–45.3)
MCH RBC QN AUTO: 34.8 PG (ref 26.6–33)
MCHC RBC AUTO-ENTMCNC: 34.3 G/DL (ref 31.5–35.7)
MCV RBC AUTO: 101.6 FL (ref 79–97)
MONOCYTES # BLD AUTO: 0.52 10*3/MM3 (ref 0.1–0.9)
MONOCYTES NFR BLD AUTO: 10.1 % (ref 5–12)
NEUTROPHILS NFR BLD AUTO: 2.76 10*3/MM3 (ref 1.7–7)
NEUTROPHILS NFR BLD AUTO: 53.5 % (ref 42.7–76)
NITRITE UR QL STRIP: NEGATIVE
NRBC BLD AUTO-RTO: 0 /100 WBC (ref 0–0.2)
PH UR STRIP.AUTO: 7 [PH] (ref 5–8)
PLATELET # BLD AUTO: 112 10*3/MM3 (ref 140–450)
PMV BLD AUTO: 9.5 FL (ref 6–12)
POTASSIUM SERPL-SCNC: 4.2 MMOL/L (ref 3.5–5.2)
PROT ?TM UR-MCNC: 5.7 MG/DL
PROT UR QL STRIP: NEGATIVE
RBC # BLD AUTO: 3.19 10*6/MM3 (ref 4.14–5.8)
RPR SER QL: NORMAL
SINUS: 3.1 CM
SODIUM SERPL-SCNC: 136 MMOL/L (ref 136–145)
SODIUM UR-SCNC: 40 MMOL/L
SP GR UR STRIP: 1.01 (ref 1–1.03)
STJ: 2.7 CM
UROBILINOGEN UR QL STRIP: ABNORMAL
WBC NRBC COR # BLD AUTO: 5.16 10*3/MM3 (ref 3.4–10.8)

## 2024-09-30 PROCEDURE — 82570 ASSAY OF URINE CREATININE: CPT | Performed by: INTERNAL MEDICINE

## 2024-09-30 PROCEDURE — 25810000003 SODIUM CHLORIDE 0.9 % SOLUTION: Performed by: INTERNAL MEDICINE

## 2024-09-30 PROCEDURE — 85025 COMPLETE CBC W/AUTO DIFF WBC: CPT | Performed by: INTERNAL MEDICINE

## 2024-09-30 PROCEDURE — 81003 URINALYSIS AUTO W/O SCOPE: CPT | Performed by: INTERNAL MEDICINE

## 2024-09-30 PROCEDURE — 97535 SELF CARE MNGMENT TRAINING: CPT

## 2024-09-30 PROCEDURE — 82436 ASSAY OF URINE CHLORIDE: CPT | Performed by: INTERNAL MEDICINE

## 2024-09-30 PROCEDURE — 84300 ASSAY OF URINE SODIUM: CPT | Performed by: INTERNAL MEDICINE

## 2024-09-30 PROCEDURE — 99232 SBSQ HOSP IP/OBS MODERATE 35: CPT | Performed by: NURSE PRACTITIONER

## 2024-09-30 PROCEDURE — 97110 THERAPEUTIC EXERCISES: CPT

## 2024-09-30 PROCEDURE — 97166 OT EVAL MOD COMPLEX 45 MIN: CPT

## 2024-09-30 PROCEDURE — 25510000001 PERFLUTREN 6.52 MG/ML SUSPENSION 2 ML VIAL: Performed by: INTERNAL MEDICINE

## 2024-09-30 PROCEDURE — 84156 ASSAY OF PROTEIN URINE: CPT | Performed by: INTERNAL MEDICINE

## 2024-09-30 PROCEDURE — 80048 BASIC METABOLIC PNL TOTAL CA: CPT | Performed by: INTERNAL MEDICINE

## 2024-09-30 PROCEDURE — 97162 PT EVAL MOD COMPLEX 30 MIN: CPT

## 2024-09-30 PROCEDURE — 82550 ASSAY OF CK (CPK): CPT | Performed by: INTERNAL MEDICINE

## 2024-09-30 PROCEDURE — 93306 TTE W/DOPPLER COMPLETE: CPT

## 2024-09-30 PROCEDURE — 93306 TTE W/DOPPLER COMPLETE: CPT | Performed by: INTERNAL MEDICINE

## 2024-09-30 RX ADMIN — ATORVASTATIN CALCIUM 40 MG: 20 TABLET, FILM COATED ORAL at 20:17

## 2024-09-30 RX ADMIN — Medication 10 ML: at 20:17

## 2024-09-30 RX ADMIN — RANOLAZINE 1000 MG: 500 TABLET, FILM COATED, EXTENDED RELEASE ORAL at 09:49

## 2024-09-30 RX ADMIN — POTASSIUM CHLORIDE 20 MEQ: 750 TABLET, EXTENDED RELEASE ORAL at 10:02

## 2024-09-30 RX ADMIN — SODIUM CHLORIDE 500 ML: 9 INJECTION, SOLUTION INTRAVENOUS at 11:34

## 2024-09-30 RX ADMIN — ASPIRIN 81 MG: 81 TABLET, COATED ORAL at 09:49

## 2024-09-30 RX ADMIN — PANTOPRAZOLE SODIUM 40 MG: 40 TABLET, DELAYED RELEASE ORAL at 06:25

## 2024-09-30 RX ADMIN — Medication 10 ML: at 10:03

## 2024-09-30 RX ADMIN — PRASUGREL 5 MG: 10 TABLET, FILM COATED ORAL at 09:49

## 2024-09-30 RX ADMIN — RANOLAZINE 1000 MG: 500 TABLET, FILM COATED, EXTENDED RELEASE ORAL at 20:17

## 2024-09-30 RX ADMIN — FOLIC ACID 1 MG: 1 TABLET ORAL at 10:06

## 2024-09-30 RX ADMIN — TAMSULOSIN HYDROCHLORIDE 0.4 MG: 0.4 CAPSULE ORAL at 20:17

## 2024-09-30 RX ADMIN — PERFLUTREN 2 ML: 6.52 INJECTION, SUSPENSION INTRAVENOUS at 09:13

## 2024-09-30 NOTE — PLAN OF CARE
Goal Outcome Evaluation:  Patient is stable. Family at the bedside. Echocardiogram done this morning.  Bp still soft, Coreg and Imdur was held. Strict I and O. Lasix held this morning per MD's order. Patient ambulates to the bathroom with walker on standby assist. AOX4 with no complaint of pain and no signs of distress.

## 2024-09-30 NOTE — PROGRESS NOTES
Name: Dale Mann ADMIT: 2024   : 1940  PCP: Sebastian Larose MD    MRN: [id number] LOS: 1 days   AGE/SEX: 83 y.o. male  ROOM: [facility]     Subjective   Subjective   Is lying on bed and does not appear to have any major distress.  Feeling much better today.  No new events overnight and denies nausea, vomiting, chest pain, shortness of breath.       Objective   Objective   Vital Signs  Temp:  [97.9 °F (36.6 °C)-98.4 °F (36.9 °C)] 97.9 °F (36.6 °C)  Heart Rate:  [55-75] 59  Resp:  [17-20] 18  BP: ()/(39-71) 124/53  SpO2:  [90 %-98 %] 98 %  on   ;   Device (Oxygen Therapy): room air  Body mass index is 27.83 kg/m².  Physical Exam  HEENT: PERRLA, extraocular movements intact, Scleras no icterus  Neck: Supple, no JVD  Cardiovascular: Regular rate and rhythm with normal S1 and S2  Respiratory: Fairly clear to auscultation bilaterally with no wheezes  GI: Soft, nontender, bowel sounds are present  Extremities: No edema, palpable pedal pulses  Neurologic: Grossly nonfocal, no facial asymmetry    Results Review     I reviewed the patient's new clinical results.  Results from last 7 days   Lab Units 24  0626   WBC 10*3/mm3 5.16 5.47 6.16   HEMOGLOBIN g/dL 11.1* 10.4* 12.6*   PLATELETS 10*3/mm3 112* 107* 127*     Results from last 7 days   Lab Units 24  0446 24  0711   SODIUM mmol/L 136 139 136   POTASSIUM mmol/L 4.2 4.5 4.7   CHLORIDE mmol/L 104 105 102   CO2 mmol/L 25.0 27.0 26.7   BUN mg/dL 19 19 21   CREATININE mg/dL 1.32* 1.39* 1.06   GLUCOSE mg/dL 81 77 106*   EGFR mL/min/1.73 54.2* 50.9* 70.5     Results from last 7 days   Lab Units 24  0711   ALBUMIN g/dL 3.5   BILIRUBIN mg/dL 0.6   ALK PHOS U/L 75   AST (SGOT) U/L 15   ALT (SGPT) U/L 11     Results from last 7 days   Lab Units 24  0317 24  0446 24  0711   CALCIUM mg/dL 8.8 8.7 8.9   ALBUMIN g/dL  --   --  3.5   MAGNESIUM mg/dL  --   --  2.3    PHOSPHORUS mg/dL  --  2.7  --        Hemoglobin A1C   Date/Time Value Ref Range Status   09/29/2024 1417 4.60 (L) 4.80 - 5.60 % Final       No radiology results for the last day    I have personally reviewed all medications:  Scheduled Medications  aspirin, 81 mg, Oral, Daily  atorvastatin, 40 mg, Oral, Nightly  carvedilol, 6.25 mg, Oral, Q12H  folic acid, 1 mg, Oral, Daily  [Held by provider] furosemide, 40 mg, Oral, Daily  isosorbide mononitrate, 60 mg, Oral, Daily  pantoprazole, 40 mg, Oral, Q AM  potassium chloride, 20 mEq, Oral, Daily  prasugrel, 5 mg, Oral, Daily  ranolazine, 1,000 mg, Oral, BID  sodium chloride, 10 mL, Intravenous, Q12H  tamsulosin, 0.4 mg, Oral, Nightly    Infusions   Diet  Diet: Regular/House, Cardiac; Healthy Heart (2-3 Na+); Fluid Consistency: Thin (IDDSI 0)    I have personally reviewed:  [x]  Laboratory   [x]  Microbiology   [x]  Radiology   [x]  EKG/Telemetry  [x]  Cardiology/Vascular   []  Pathology    []  Records       Assessment/Plan     Active Hospital Problems    Diagnosis  POA    **Frequent falls [R29.6]  Not Applicable      Resolved Hospital Problems   No resolved problems to display.       1.Frequent falls/orthostasis/recurrent syncope, currently blood pressure is soft to low therefore Coreg, Lasix, Imdur were  held. Echo revealed ejection fraction of 49% with grade 2 diastolic dysfunction.  Cardiology following along as well.    2.  Neuropathy, extensive serologic workup has been initiated by neurology and tested negative for HIV and hepatitis.  TSH level, B12 and folate levels are normal.   EEG to rule out seizure activity given CT findings of left frontal encephalomalacia.  Neurology ordered MRI of the spine but unable to obtain secondary to cranial plate from prior surgery.    3.  Chronic heart failure, currently appears to be concentrated and secondary to soft low blood pressures Coreg and Lasix have been held.    4.  Coronary artery disease, currently chest pain-free  and is on aspirin, Effient, Ranexa will be continued.  Held Coreg, isosorbide.    5. Macrocytic anemia, again B12 and folate levels are normal.  No evidence of any active bleeding and will follow-up with hematology as an outpatient basis.    6.  BPH, on Flomax.    7. Acute kidney injury, likely due to hypoperfusion and monitor ins and outs closely.  Nephrology consult will also be obtained.  Creatinine has improved from 1.39-->1.32.    8.  On SCDs for DVT prophylaxis.    9.  CODE STATUS is full code.     Estimated discharge date, 10-2-24     Copied text on this note has been reviewed by me on 9/30/2024      August Griffin MD  [facility]  09/30/24  15:44 EDT

## 2024-09-30 NOTE — PLAN OF CARE
Goal Outcome Evaluation:     Patient is a 83 y.o. male admitted to [facility] on 9/28/2024 for frequent falls and confusion. PMHx includes recent rehab stays and has been residing at daughter's house the last several weeks. Patient is ambulatory at baseline with no prior use of AD. Today, patient UIC upon PT arrival, required CGA for transfers, and ambulated 30' CGA with a RW. Mild balance and strength deficits noted. Patient may benefit from skilled PT services acutely to address functional deficits as well as improve level of independence prior to discharge. Anticipate DC to SNF with LTC transition upon DC.      Anticipated Discharge Disposition (PT): home with home health, skilled nursing facility (pending patient progress- per chart, family unable to care for patient at home.)

## 2024-09-30 NOTE — CASE MANAGEMENT/SOCIAL WORK
Discharge Planning Assessment  [facility]     Patient Name: Dale Mann  MRN: [id number]  Today's Date: 9/30/2024    Admit Date: 9/28/2024    Plan: SNF vs home, will need transportation arranged   Discharge Needs Assessment       Row Name 09/30/24 1233       Living Environment    People in Home child(hugo), adult    Name(s) of People in Home Satinder Appiah [phone]    Current Living Arrangements home    Potentially Unsafe Housing Conditions none    In the past 12 months has the electric, gas, oil, or water company threatened to shut off services in your home? No    Primary Care Provided by self    Provides Primary Care For no one, unable/limited ability to care for self    Family Caregiver if Needed child(hugo), adult    Family Caregiver Names Satinder Appiah    Quality of Family Relationships helpful;involved;supportive    Able to Return to Prior Arrangements other (see comments)  SNF       Resource/Environmental Concerns    Resource/Environmental Concerns none       Transportation Needs    In the past 12 months, has lack of transportation kept you from medical appointments or from getting medications? no    In the past 12 months, has lack of transportation kept you from meetings, work, or from getting things needed for daily living? No       Food Insecurity    Within the past 12 months, you worried that your food would run out before you got the money to buy more. Never true    Within the past 12 months, the food you bought just didn't last and you didn't have money to get more. Never true       Transition Planning    Patient/Family Anticipates Transition to other (see comments)  SNF    Patient/Family Anticipated Services at Transition skilled nursing    Transportation Anticipated health plan transportation       Discharge Needs Assessment    Readmission Within the Last 30 Days no previous admission in last 30 days    Equipment Currently Used at Home walker, rolling    Concerns to be Addressed discharge  planning    Anticipated Changes Related to Illness inability to care for self    Equipment Needed After Discharge none    Outpatient/Agency/Support Group Needs skilled nursing facility    Discharge Facility/Level of Care Needs nursing facility, skilled    Provided Post Acute Provider List? N/A    Provided Post Acute Provider Quality & Resource List? N/A                   Discharge Plan       Row Name 09/30/24 1236       Plan    Plan SNF vs home, will need transportation arranged    Plan Comments Met with pt at  bedside. Introduced self and explained role of . Pt is oriented to self. Permission obtained to speak to daughter, Satinder. Face sheet verified.  Pt has no difficulty paying for his medications, and he obtains his medications from [facility]. Pt is currently living with his daughter, Satinder, who stated that he has had multiple falls, and has required more assistance with his ADL'. Pt has required verbal cueing to complete tasks. Pt has never had home health, and he has been to [facility] in the past. Daughter has requested a referral to [facility], as they were very happy with the care he received. Referral placed in [facility] informed of referral. Upon discharge, pt will need pre-cert and will need ambulance transportation. Explained that [facility] would follow to assess for discharge needs.                  Continued Care and Services - Admitted Since 9/28/2024    No active coordination exists for this encounter.       Expected Discharge Date and Time       Expected Discharge Date Expected Discharge Time    Oct 2, 2024            Demographic Summary    No documentation.                  Functional Status    No documentation.                  Psychosocial    No documentation.                  Abuse/Neglect    No documentation.                  Legal    No documentation.                  Substance Abuse    No documentation.                  Patient Forms    No  documentation.                     Dang James RN
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
## 2024-09-30 NOTE — DISCHARGE PLACEMENT REQUEST
"Joey Ang (83 y.o. Male)       Date of Birth   1940    Social Security Number       Address   [address] [place] [address]    Home Phone       MRN   [id number]       [employer]   [facility]    Marital Status                               Admission Date   9/28/24    Admission Type   Emergency    Admitting Provider   Jose Mcgowan MD    Attending Provider   August Griffin MD    Department, Room/Bed   [address], [address]/1       Discharge Date       Discharge Disposition       Discharge Destination                                 Attending Provider: August Griffin MD    Allergies: Norvasc [Amlodipine Besylate], Ticagrelor, Coumadin [Warfarin Sodium], Lipitor [Atorvastatin], Clopidogrel, Enalapril, Plavix [Clopidogrel Bisulfate]    Isolation: Contact   Infection: Candida Auris (rule out) (09/28/24)   Code Status: CPR    Ht: 172.7 cm (68\")   Wt: 83 kg (183 lb)    Admission Cmt: None   Principal Problem: Frequent falls [R29.6]                   Active Insurance as of 9/28/2024       Primary Coverage       Payor Plan Insurance Group Employer/Plan Group    HUMANA MEDICARE REPLACEMENT HU[employer] MED ADV GROUP [id number]       Payor Plan Address Payor Plan Phone Number Payor Plan Fax Number Effective Dates    [address] [address] [phone]  1/1/2019 - None Entered    [facility] [id number]         Subscriber Name Subscriber Birth Date Member ID       JOEY ANG 1940 [id number]                     Emergency Contacts        (Rel.) Home Phone Work Phone Mobile Phone    Satinder Appiah (Daughter) [phone] -- [phone]                "
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
## 2024-09-30 NOTE — PLAN OF CARE
Goal Outcome Evaluation:              Outcome Evaluation: Pt is an 82 y/o M admitted to [facility] 9/28 with unwitnessed fall with n/v.  PMH significant for hypertension, coronary atherosclerosis, chronic anemia, congestive heart failure, hyperlipidemia. Notes indicate Pt w/ increased falls at home, moving in with dughter after rehab but falling up to ~3x/day. Also hx of dementia, Pt was oriented x4 today, and verb'd PLF. Pt does have impairments w/ balance, ADL IND, and walker safety/IND within the room, min cues for hand placement and reach back. Would benefit from SAMANTHA at [place].      Anticipated Discharge Disposition (OT): skilled nursing facility

## 2024-09-30 NOTE — PROGRESS NOTES
"DOS: 2024  NAME: Dale Mann   : 1940  PCP: Sebastian Larose MD  Chief Complaint   Patient presents with    Fall    Vomiting     Stroke    Subjective: No new complaints overnight.  Reports over the last 3 years he has developed some numbness in his feet.  He reports difficulties with recurrent falls, sometimes preceded by dizziness but not other times.  He reports occasional loss of consciousness with spells and sometimes he just loses his balance for unknown reasons. Pt seen in follow up today, however the problem is new to the examiner.      Objective:  Vital signs: /53 (BP Location: Left arm, Patient Position: Sitting)   Pulse 59   Temp 97.9 °F (36.6 °C) (Oral)   Resp 18   Ht 172.7 cm (68\")   Wt 83 kg (183 lb)   SpO2 98%   BMI 27.83 kg/m²       GEN: well-developed, well-nourished, NAD, vital signs reviewed  HEENT: Normocephalic, atraumatic   COR: RRR  Resp: Even and unlabored, Clear to auscultation bilaterally  Extremities: No edema    Neurological:   MS: AOx3. Language normal. No neglect. Higher integrative function normal  CN: Visual fields intact, PERRL, extraocular movements intact, normal facial sensation, no droop, tongue midline, no dysarthria  Motor: 5/5 in all extremities, normal tone  Sensory: Decreased to light touch in bilateral lower extremities in the ankles and feet  Coordination: Normal finger-to-nose bilaterally    Scheduled Meds:aspirin, 81 mg, Oral, Daily  atorvastatin, 40 mg, Oral, Nightly  carvedilol, 6.25 mg, Oral, Q12H  folic acid, 1 mg, Oral, Daily  [Held by provider] furosemide, 40 mg, Oral, Daily  isosorbide mononitrate, 60 mg, Oral, Daily  pantoprazole, 40 mg, Oral, Q AM  potassium chloride, 20 mEq, Oral, Daily  prasugrel, 5 mg, Oral, Daily  ranolazine, 1,000 mg, Oral, BID  sodium chloride, 500 mL, Intravenous, Once  sodium chloride, 10 mL, Intravenous, Q12H  tamsulosin, 0.4 mg, Oral, Nightly      Continuous Infusions:   PRN Meds:.  " acetaminophen **OR** acetaminophen **OR** acetaminophen    albuterol    senna-docusate sodium **AND** polyethylene glycol **AND** bisacodyl **AND** bisacodyl    melatonin    nitroglycerin    ondansetron ODT **OR** ondansetron    Polyvinyl Alcohol-Povidone PF    [COMPLETED] Insert Peripheral IV **AND** sodium chloride    sodium chloride    sodium chloride    Laboratory results:  Lab Results   Component Value Date    GLUCOSE 81 09/30/2024    CALCIUM 8.8 09/30/2024     09/30/2024    K 4.2 09/30/2024    CO2 25.0 09/30/2024     09/30/2024    BUN 19 09/30/2024    CREATININE 1.32 (H) 09/30/2024    EGFRIFNONA 62 07/20/2021    BCR 14.4 09/30/2024    ANIONGAP 7.0 09/30/2024     Lab Results   Component Value Date    WBC 5.16 09/30/2024    HGB 11.1 (L) 09/30/2024    HCT 32.4 (L) 09/30/2024    .6 (H) 09/30/2024     (L) 09/30/2024     Lab Results   Component Value Date    CHOL 106 (L) 07/18/2022    CHOL 111 (L) 07/20/2021    CHOL 117 (L) 07/15/2020     Lab Results   Component Value Date    HDL 63 (H) 02/20/2024    HDL 47 07/18/2022    HDL 33 (L) 07/20/2021     Lab Results   Component Value Date    LDL 62 02/20/2024    LDL 44 02/09/2023    LDL 41 07/18/2022     Lab Results   Component Value Date    TRIG 88 02/20/2024    TRIG 94 07/18/2022    TRIG 92 07/20/2021         Lab 09/29/24  1417   HEMOGLOBIN A1C 4.60*      Review and interpretation of imaging:  No radiology results for the last day    Impression:  83-year-old male with HTN, HLD, CAD status post CABG/stent, cardiomyopathy, arthritis, 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  Patient previously told he had orthostatic hypotension which was contributing.  Patient reports he has been falling for the last couple of years, previously would feel lightheaded or dizzy prior to the fall and legs would give out.  He was also complaining of  chronic neck pain but denied prior strokes or diagnoses of diabetes or peripheral neuropathy.  Apparently he was recently treated at a rehab with minimal improvement, noted some occasional lightheaded.    On exam he had decreased sensation to light touch, pin, temperature, and vibration in lower extremities up to the knees, normal in the upper extremities as well as increased reflexes on his right side as well as positive Kanwal in the right upper extremity but tone was normal without cogwheel rigidity or tremor.  Orthostatics checked this admission and they were reportedly negative but systolic blood pressure has been in the 70s to 90s at times.    Work up:  CT head showed moderate diffuse atrophy and chronic small vessel disease with large left frontal craniotomy with underlying encephalomalacia in the left hilar lobe but no acute findings.  EEG: Normal in awake and sleep states, no potential seizure activity or focal slowing  Labs: @ OSH: HIV 1 and 2 antibodies negative/nonreactive, CK level 53.  This admissionTSH 2.9, CK level 46, B12 417, folate 15.4, hemoglobin A1c 4.6, H&H 12.6/36.8, rheumatoid factor 10.4    Diagnosis:  Recurrent falls with reported lightheadedness followed by loss of consciousness with a history of orthostatic hypotension as well as evidence of peripheral neuropathy  CAD status post CABG and stents  Neck pain  CANDY    Plan:  F/U MRI C and T spine  Follow-up labs RPR: Cryoglobulin, Sjogren's antibody  Discussed with Dr. Montoya today.  Neurology will follow.

## 2024-10-01 LAB
ALBUMIN SERPL-MCNC: 3.3 G/DL (ref 3.5–5.2)
ANA SER QL: NEGATIVE
ANION GAP SERPL CALCULATED.3IONS-SCNC: 6 MMOL/L (ref 5–15)
BASOPHILS # BLD AUTO: 0.02 10*3/MM3 (ref 0–0.2)
BASOPHILS NFR BLD AUTO: 0.4 % (ref 0–1.5)
BUN SERPL-MCNC: 18 MG/DL (ref 8–23)
BUN/CREAT SERPL: 12.2 (ref 7–25)
C3 SERPL-MCNC: 109 MG/DL (ref 82–167)
C4 SERPL-MCNC: 21 MG/DL (ref 12–38)
CALCIUM SPEC-SCNC: 8.7 MG/DL (ref 8.6–10.5)
CHLORIDE SERPL-SCNC: 105 MMOL/L (ref 98–107)
CO2 SERPL-SCNC: 25 MMOL/L (ref 22–29)
CREAT SERPL-MCNC: 1.47 MG/DL (ref 0.76–1.27)
DEPRECATED RDW RBC AUTO: 55.4 FL (ref 37–54)
EGFRCR SERPLBLD CKD-EPI 2021: 47.6 ML/MIN/1.73
ENA SS-A AB SER-ACNC: <0.2 AI (ref 0–0.9)
ENA SS-B AB SER-ACNC: <0.2 AI (ref 0–0.9)
EOSINOPHIL # BLD AUTO: 0.12 10*3/MM3 (ref 0–0.4)
EOSINOPHIL NFR BLD AUTO: 2.5 % (ref 0.3–6.2)
ERYTHROCYTE [DISTWIDTH] IN BLOOD BY AUTOMATED COUNT: 14.9 % (ref 12.3–15.4)
GLUCOSE SERPL-MCNC: 84 MG/DL (ref 65–99)
HCT VFR BLD AUTO: 35.6 % (ref 37.5–51)
HGB BLD-MCNC: 12 G/DL (ref 13–17.7)
IMM GRANULOCYTES # BLD AUTO: 0.06 10*3/MM3 (ref 0–0.05)
IMM GRANULOCYTES NFR BLD AUTO: 1.2 % (ref 0–0.5)
LYMPHOCYTES # BLD AUTO: 1.34 10*3/MM3 (ref 0.7–3.1)
LYMPHOCYTES NFR BLD AUTO: 27.5 % (ref 19.6–45.3)
MCH RBC QN AUTO: 34.3 PG (ref 26.6–33)
MCHC RBC AUTO-ENTMCNC: 33.7 G/DL (ref 31.5–35.7)
MCV RBC AUTO: 101.7 FL (ref 79–97)
MONOCYTES # BLD AUTO: 0.47 10*3/MM3 (ref 0.1–0.9)
MONOCYTES NFR BLD AUTO: 9.7 % (ref 5–12)
NEUTROPHILS NFR BLD AUTO: 2.86 10*3/MM3 (ref 1.7–7)
NEUTROPHILS NFR BLD AUTO: 58.7 % (ref 42.7–76)
NRBC BLD AUTO-RTO: 0 /100 WBC (ref 0–0.2)
PHOSPHATE SERPL-MCNC: 2.6 MG/DL (ref 2.5–4.5)
PLATELET # BLD AUTO: 116 10*3/MM3 (ref 140–450)
PMV BLD AUTO: 9.2 FL (ref 6–12)
POTASSIUM SERPL-SCNC: 4.5 MMOL/L (ref 3.5–5.2)
RBC # BLD AUTO: 3.5 10*6/MM3 (ref 4.14–5.8)
RHEUMATOID FACT SERPL-ACNC: 12.5 IU/ML
SODIUM SERPL-SCNC: 136 MMOL/L (ref 136–145)
URATE SERPL-MCNC: 5.1 MG/DL (ref 3.4–7)
WBC NRBC COR # BLD AUTO: 4.87 10*3/MM3 (ref 3.4–10.8)

## 2024-10-01 PROCEDURE — 99232 SBSQ HOSP IP/OBS MODERATE 35: CPT | Performed by: NURSE PRACTITIONER

## 2024-10-01 PROCEDURE — 80069 RENAL FUNCTION PANEL: CPT | Performed by: INTERNAL MEDICINE

## 2024-10-01 PROCEDURE — 85025 COMPLETE CBC W/AUTO DIFF WBC: CPT | Performed by: INTERNAL MEDICINE

## 2024-10-01 PROCEDURE — 97530 THERAPEUTIC ACTIVITIES: CPT

## 2024-10-01 PROCEDURE — 97110 THERAPEUTIC EXERCISES: CPT

## 2024-10-01 PROCEDURE — 84550 ASSAY OF BLOOD/URIC ACID: CPT | Performed by: INTERNAL MEDICINE

## 2024-10-01 PROCEDURE — 99232 SBSQ HOSP IP/OBS MODERATE 35: CPT | Performed by: INTERNAL MEDICINE

## 2024-10-01 RX ADMIN — FOLIC ACID 1 MG: 1 TABLET ORAL at 08:56

## 2024-10-01 RX ADMIN — Medication 10 ML: at 08:58

## 2024-10-01 RX ADMIN — TAMSULOSIN HYDROCHLORIDE 0.4 MG: 0.4 CAPSULE ORAL at 20:34

## 2024-10-01 RX ADMIN — ASPIRIN 81 MG: 81 TABLET, COATED ORAL at 08:56

## 2024-10-01 RX ADMIN — POTASSIUM CHLORIDE 20 MEQ: 750 TABLET, EXTENDED RELEASE ORAL at 08:57

## 2024-10-01 RX ADMIN — ATORVASTATIN CALCIUM 40 MG: 20 TABLET, FILM COATED ORAL at 20:34

## 2024-10-01 RX ADMIN — RANOLAZINE 1000 MG: 500 TABLET, FILM COATED, EXTENDED RELEASE ORAL at 08:56

## 2024-10-01 RX ADMIN — PANTOPRAZOLE SODIUM 40 MG: 40 TABLET, DELAYED RELEASE ORAL at 05:39

## 2024-10-01 RX ADMIN — Medication 10 ML: at 20:35

## 2024-10-01 RX ADMIN — RANOLAZINE 1000 MG: 500 TABLET, FILM COATED, EXTENDED RELEASE ORAL at 20:34

## 2024-10-01 RX ADMIN — PRASUGREL 5 MG: 10 TABLET, FILM COATED ORAL at 08:57

## 2024-10-01 NOTE — PLAN OF CARE
Goal Outcome Evaluation:  Plan of Care Reviewed With: patient        Progress: no change  Outcome Evaluation: Pt has been AOX4. Pleasant and cooperative with all care. Pt BP has again been low this shift, not able to give Coreg 6.25 again. Nephrology holding lasix. Pt up to use urinal at bedside. Pt rested well. On room air. CTM, safety maintained.

## 2024-10-01 NOTE — PROGRESS NOTES
DOS: 10/1/2024  NAME: Dale Mann   : 1940  PCP: Sebastian Larose MD    Chief Complaint   Patient presents with    Fall    Vomiting         Subjective: Pt seen in follow up, however the problem is new to me.  Patient sitting up in his recliner resting comfortably.  States he feels okay today.  No family bedside    Objective:  Vital signs:   Vitals:    24 1952 10/01/24 0034 10/01/24 0713 10/01/24 0851   BP: 116/58 93/76 123/63 105/48   BP Location: Right arm Right arm Right arm Left arm   Patient Position: Lying Lying Lying Sitting   Pulse: 56 65 56    Resp: 18 18 16 16   Temp: 97.9 °F (36.6 °C) 97.9 °F (36.6 °C) 98.1 °F (36.7 °C)    TempSrc: Oral Oral Oral    SpO2: 97% 95% 96%    Weight:       Height:           Current Facility-Administered Medications:     acetaminophen (TYLENOL) tablet 650 mg, 650 mg, Oral, Q4H PRN **OR** acetaminophen (TYLENOL) 160 MG/5ML oral solution 650 mg, 650 mg, Oral, Q4H PRN **OR** acetaminophen (TYLENOL) suppository 650 mg, 650 mg, Rectal, Q4H PRN, Jose Mcgowan MD    albuterol (ACCUNEB) nebulizer solution 0.63 mg, 0.63 mg, Nebulization, Q6H PRN, Jose Mcgowan MD    aspirin EC tablet 81 mg, 81 mg, Oral, Daily, Jose Mcgowan MD, 81 mg at 10/01/24 0856    atorvastatin (LIPITOR) tablet 40 mg, 40 mg, Oral, Nightly, Jose Mcgowan MD, 40 mg at 24 2017    sennosides-docusate (PERICOLACE) 8.6-50 MG per tablet 2 tablet, 2 tablet, Oral, BID PRN **AND** polyethylene glycol (MIRALAX) packet 17 g, 17 g, Oral, Daily PRN **AND** bisacodyl (DULCOLAX) EC tablet 5 mg, 5 mg, Oral, Daily PRN **AND** bisacodyl (DULCOLAX) suppository 10 mg, 10 mg, Rectal, Daily PRN, Jose Mcgowan MD    folic acid (FOLVITE) tablet 1 mg, 1 mg, Oral, Daily, Jose Mcgowan MD, 1 mg at 10/01/24 0856    melatonin tablet 5 mg, 5 mg, Oral, Nightly PRN, Jose Mcgowan MD    nitroglycerin (NITROSTAT) SL tablet 0.4 mg, 0.4 mg, Sublingual, Q5 Min PRN,  Jose Mcgowan MD    ondansetron ODT (ZOFRAN-ODT) disintegrating tablet 4 mg, 4 mg, Oral, Q6H PRN **OR** ondansetron (ZOFRAN) injection 4 mg, 4 mg, Intravenous, Q6H PRN, Jose Mcgowan MD    pantoprazole (PROTONIX) EC tablet 40 mg, 40 mg, Oral, Q AM, Jose Mcgowan MD, 40 mg at 10/01/24 0539    Polyvinyl Alcohol-Povidone PF (ARTIFICIAL TEARS) 1.4-0.6 % ophthalmic solution 1 drop, 1 drop, Both Eyes, Q1H PRN, Jose Mcgowan MD    prasugrel (EFFIENT) tablet 5 mg, 5 mg, Oral, Daily, EdlingAnthony MD, 5 mg at 10/01/24 0857    ranolazine (RANEXA) 12 hr tablet 1,000 mg, 1,000 mg, Oral, BID, Jose Mcgowan MD, 1,000 mg at 10/01/24 0856    [COMPLETED] Insert Peripheral IV, , , Once **AND** sodium chloride 0.9 % flush 10 mL, 10 mL, Intravenous, PRN, Jose Mcgowan MD, 10 mL at 09/28/24 0926    sodium chloride 0.9 % flush 10 mL, 10 mL, Intravenous, Q12H, Jose Mcgowan MD, 10 mL at 10/01/24 0858    sodium chloride 0.9 % flush 10 mL, 10 mL, Intravenous, PRN, Jose Mcgowan MD    sodium chloride 0.9 % infusion 40 mL, 40 mL, Intravenous, PRN, Jose Mcgowan MD    tamsulosin (FLOMAX) 24 hr capsule 0.4 mg, 0.4 mg, Oral, Nightly, Jose Mcgowan MD, 0.4 mg at 09/30/24 2017    PRN meds    acetaminophen **OR** acetaminophen **OR** acetaminophen    albuterol    senna-docusate sodium **AND** polyethylene glycol **AND** bisacodyl **AND** bisacodyl    melatonin    nitroglycerin    ondansetron ODT **OR** ondansetron    Polyvinyl Alcohol-Povidone PF    [COMPLETED] Insert Peripheral IV **AND** sodium chloride    sodium chloride    sodium chloride    No current facility-administered medications on file prior to encounter.     Current Outpatient Medications on File Prior to Encounter   Medication Sig    albuterol sulfate  (90 Base) MCG/ACT inhaler     aspirin 81 MG EC tablet Take 1 tablet by mouth Daily. Last dose 11/27/17    atorvastatin (LIPITOR) 40 MG tablet Take 1  tablet by mouth Every Night.    Cyanocobalamin 1000 MCG/ML kit Inject 1 mL as directed 1 (One) Time Per Week.    Dexilant 60 MG capsule Take 1 capsule by mouth Daily.    folic acid (FOLVITE) 1 MG tablet Take 1 tablet by mouth Daily.    isosorbide mononitrate (IMDUR) 60 MG 24 hr tablet TAKE 1 TABLET BY MOUTH DAILY    melatonin 5 MG tablet tablet Take 1 tablet by mouth At Night As Needed.    nitroglycerin (NITROSTAT) 0.4 MG SL tablet PLACE 1 TAB UNDER THE TONGUE EVERY 5 MINUTES AS NEEDED FOR CHEST PAIN. NO MORE THAN 3 DOSES IN 15 MINUTES NEEDS APPOINTMENT.    prasugrel (EFFIENT) 10 MG tablet Take 1 tablet by mouth Daily.    ranolazine (RANEXA) 1000 MG 12 hr tablet TAKE ONE TABLET BY MOUTH TWO TIMES A DAY    simethicone (Gas-X) 80 MG chewable tablet Chew 1 tablet Every 6 (Six) Hours As Needed for Flatulence.    tamsulosin (FLOMAX) 0.4 MG capsule 24 hr capsule Take 1 capsule by mouth Every Night.    Testosterone Cypionate (DEPOTESTOTERONE CYPIONATE) 200 MG/ML injection Inject 1 mL into the appropriate muscle as directed by prescriber Every 14 (Fourteen) Days.    furosemide (LASIX) 40 MG tablet TAKE 1 TABLET BY MOUTH DAILY.    metoprolol tartrate (LOPRESSOR) 50 MG tablet TAKE ONE TABLET BY MOUTH EVERY DAY REPLACES ATENOLOL    polyethyl glycol-propyl glycol (SYSTANE) 0.4-0.3 % solution ophthalmic solution Administer 2 drops to both eyes Every 1 (One) Hour As Needed.    polyethylene glycol (GoLYTELY) 236 g solution Starting at noon on day prior to procedure, drink 8 ounces every 30 minutes until all gone or stools are clear. May add flavor packet.       General appearance: Elderly male, NAD, alert and cooperative  HEENT: Normocephalic, atraumatic    Neurological:   MS: oriented to person, place, year, month, able to correctly identify wall clock time, normal attention/concentration, language intact although delayed response with speech at times, no neglect  CN: visual fields full, EOMI, facial sensation equal, no facial  "droop, shoulder shrug equal, tongue midline  Motor: 5/5 in all 4 ext., normal tone  Reflexes: 1+ in all 4 ext.  Sensory: light touch sensation intact in all 4 ext.  Coordination: Normal finger to nose test  Gait and station: Deferred  Rapid alternating movements: normal finger to thumb tap    Laboratory results:  Lab Results   Component Value Date    TSH 2.910 09/29/2024     Lab Results   Component Value Date    VOVQUCRR35 417 09/29/2024     Lab Results   Component Value Date    HGBA1C 4.60 (L) 09/29/2024     Lab Results   Component Value Date    GLUCOSE 84 10/01/2024    BUN 18 10/01/2024    CREATININE 1.47 (H) 10/01/2024    EGFRIFNONA 62 07/20/2021    BCR 12.2 10/01/2024    K 4.5 10/01/2024    CO2 25.0 10/01/2024    CALCIUM 8.7 10/01/2024    ALBUMIN 3.3 (L) 10/01/2024    AST 15 09/28/2024    ALT 11 09/28/2024     Lab Results   Component Value Date    WBC 4.87 10/01/2024    HGB 12.0 (L) 10/01/2024    HCT 35.6 (L) 10/01/2024    .7 (H) 10/01/2024     (L) 10/01/2024     Brief Urine Lab Results  (Last result in the past 365 days)        Color   Clarity   Blood   Leuk Est   Nitrite   Protein   CREAT   Urine HCG        09/30/24 1200             36.2         09/30/24 1200 Yellow   Clear   Negative   Negative   Negative   Negative                 No results found for: \"ACANTHNAEG\", \"AFBCX\", \"BPERTUSSISCX\", \"BLOODCX\"  No results found for: \"BCIDPCR\", \"CXREFLEX\", \"CSFCX\", \"CULTURETIS\"  No results found for: \"CULTURES\", \"HSVCX\", \"URCX\"  No results found for: \"EYECULTURE\", \"GCCX\", \"HSVCULTURE\", \"LABHSV\"  No results found for: \"LEGIONELLA\", \"MRSACX\", \"MUMPSCX\", \"MYCOPLASCX\"  No results found for: \"NOCARDIACX\", \"STOOLCX\"  No results found for: \"THROATCX\", \"UNSTIMCULT\", \"URINECX\", \"CULTURE\", \"VZVCULTUR\"  No results found for: \"VIRALCULTU\", \"WOUNDCX\"  Pain Management Panel  More data may exist         Latest Ref Rng & Units 9/30/2024 9/29/2024   Pain Management Panel   Creatinine, Urine mg/dL 36.2  64.7       Details  "                  Review and interpretation of imaging:  No radiology results for the last 7 days  Results for orders placed during the hospital encounter of 09/28/24    Adult Transthoracic Echo Complete W/ Cont if Necessary Per Protocol    Interpretation Summary    Left ventricular systolic function is low normal. Calculated left ventricular EF = 49.3%.    The left ventricular cavity is mildly dilated.    Left ventricular diastolic function is consistent with (grade II w/high LAP) pseudonormalization.    Mild-moderate mitral regurgitation.    Estimated right ventricular systolic pressure from tricuspid regurgitation is normal (<35 mmHg).    The left atrial cavity is moderately dilated.  Normal RA and IVC size.      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  He had also reported nausea and vomiting the morning of admission.  Family had been concerned about his recurrent falls over the last several months but the patient also reported falls for several years.  Reportedly he had an admission at [facility] and was sent to acute rehab afterwards for a short stent.    BP on arrival 121/58, but he has been quite hypotensive since shortly after admission with BP dropping as low as 74/35.  Orthostatics were reportedly negative he did report having some recurrent lightheadedness and dizziness prior to some of his falls.  His noncontrast CT head on arrival revealed moderate diffuse atrophy and chronic small vessel disease with a large area of encephalomalacia in the left hilar lobe from a prior left frontal craniotomy.  He had an EEG that was normal.  We had recommended MRI spinal imaging however the patient is unable to undergo these due to metal  present in the body.    Diagnosis:  Recurrent falls   Dizziness and lightheadedness  CANDY on CKD  History of orthostatic hypotension  Peripheral neuropathy    Plan:  - He is unable to undergo MRI's per RN due to the presence of a metal plate.  - He has had no recurrence of dizziness in the last 24 hours. Bps are doing slightly better but still low. I spoke with cardiology, they have d/c'd his Carvedilol and his Imdur. Will see if this helps reduce dizziness symptoms.   - He may have some underlying dysautonomia contributing given concern for neuropathy on exam.  His RPR was normal.  Cryoglobulin, Sjogren's antibody, DRISS/RA/C3/C4 are still pending.  - Will arrange him follow-up in the outpatient setting for consideration of EMG/NCS with Dr. Mayank Espinosa.  -Continue PT/OT  - Falls precautions  We will sign off and see again per request.    Case discussed with patient and Dr. Montoya, and he agrees with plan above.     ZENAIDA David

## 2024-10-01 NOTE — PLAN OF CARE
Goal Outcome Evaluation:  Plan of Care Reviewed With: patient           Outcome Evaluation: Pt was seen for PT tx this AM. Pt was in bed and awoken by this PTA although agreeable to therapy. Pt sat up to EOB w/ SBA. Pt stood req CGA. Pt amb into hallway w/ CGA and use of fww. Pt was slow w/ no overt LOB. Pt performed seated ther ex and was UIC at end of session w/ breakfast tray.      Anticipated Discharge Disposition (PT): skilled nursing facility

## 2024-10-01 NOTE — PLAN OF CARE
Goal Outcome Evaluation:  Pt AOX4. Ambulates with walker with standby assistance. BP still soft. Cardiology d/c'ed his coreg and imdur. No complaint of pain. No signs of distress. Will continue to monitor.

## 2024-10-01 NOTE — THERAPY TREATMENT NOTE
Patient Name: Dale Mann  : 1940    MRN: [id number]                              Today's Date: 10/1/2024       Admit Date: 2024    Visit Dx:     ICD-10-CM ICD-9-CM   1. Frequent falls  R29.6 V15.88   2. Unstable gait  R26.81 781.2   3. Elevated troponin  R79.89 790.6     Patient Active Problem List   Diagnosis    Macrocytic anemia    Old myocardial infarction    Hyperkalemia    History of peptic ulcer    Hematochezia    Fever    Essential hypertension    Dysuria    Diverticulosis of sigmoid colon    CAD (coronary artery disease), native coronary artery    Chronic gastritis    Chronic anemia    Epigastric pain    Encounter for colonoscopy due to history of adenomatous colonic polyps    Dysphagia    Gastroesophageal reflux disease    Acute on chronic systolic CHF (congestive heart failure), NYHA class 2    CANDY (acute kidney injury)    Bradycardia    History of coronary artery bypass graft    Hyperlipidemia    Hearing loss    Precordial pain    Frequent falls    Orthostatic hypotension    CHF (congestive heart failure)    CANDY (acute kidney injury)    Neuropathy     Past Medical History:   Diagnosis Date    Acid reflux     Arthritis     Chronic anemia     Chronic gastritis     Coronary arteriosclerosis     hx of CABG then stent placement,  is followed by dr pickard in [place]    Coronary artery disease of bypass graft of native heart with stable angina pectoris 2019    Diverticulosis of sigmoid colon     Dysuria     Essential hypertension     Fever     Hematochezia     High cholesterol     History of peptic ulcer     [place] (hard of hearing)     wearing heading aids david    Hyperkalemia     Macular degeneration     left eye only    Old myocardial infarction     Other specified anemias     Screening for malignant neoplasm of prostate     Upper respiratory infection     Urinary tract infectious disease      Past Surgical History:   Procedure Laterality Date    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 A single polyp was found in the sigmoid colon. Removed by cold biopsy polypectomy. Internal and external hemorrhoids found.    COLONOSCOPY N/A 05/16/2018    Procedure: COLONOSCOPY;  Surgeon: Ricardo Chamberlain MD;  Location: [facility] ENDOSCOPY;  Service: Gastroenterology    CORONARY ANGIOPLASTY  2007    CORONARY ARTERY BYPASS GRAFT      CORONARY STENT PLACEMENT Left     ENDOSCOPY N/A 05/16/2018    Procedure: ESOPHAGOGASTRODUODENOSCOPY;  Surgeon: Ricardo Chamberlain MD;  Location: [facility] ENDOSCOPY;  Service: Gastroenterology    INGUINAL HERNIA REPAIR Left 12/08/2017    Procedure: OPEN LEFT INGUINAL HERNIA REPAIR WITH MESH;  Surgeon: Dale Diehl MD;  Location: [facility] OR;  Service:     PHALLOPLASTY, CIRCUMCISION N/A 05/24/2017    Procedure: CIRCUMCISION;  Surgeon: Dale Lezama MD;  Location: [facility] OR;  Service:     UPPER GASTROINTESTINAL ENDOSCOPY  01/14/2015    Normal esophagus. Gastritis was found in the stomach. Biopsy taken. Duodenitis was found in the duodenum. Biopsy taken.      General Information       Row Name 10/01/24 1053          Physical Therapy Time and Intention    Document Type therapy note (daily note)  -PH     Mode of Treatment physical therapy  -       Row Name 10/01/24 1053          General Information    Existing Precautions/Restrictions fall;orthostatic hypotension  -       Row Name 10/01/24 1053          Cognition    Orientation Status (Cognition) oriented x 4  -       Row Name 10/01/24 1053          Safety Issues, Functional Mobility    Impairments Affecting Function (Mobility) balance;endurance/activity tolerance;strength  -     Comment, Safety Issues/Impairments (Mobility) gt belt and non skid socks donned; pt reported mild dizziness during session although stated it was significantly  improved  -PH               User Key  (r) = Recorded By, (t) = Taken By, (c) = Cosigned By      Initials Name Provider Type    PH Joanne Diez PTA Physical Therapist Assistant                   Mobility       Row Name 10/01/24 1054          Bed Mobility    Bed Mobility supine-sit  -PH     Supine-Sit [place] (Bed Mobility) standby assist  -PH       Row Name 10/01/24 1054          Sit-Stand Transfer    Sit-Stand [place] (Transfers) contact guard;verbal cues  -PH     Assistive Device (Sit-Stand Transfers) walker, front-wheeled  -PH       Row Name 10/01/24 1054          Gait/Stairs (Locomotion)    [place] Level (Gait) contact guard  -PH     Assistive Device (Gait) walker, front-wheeled  -PH     Distance in Feet (Gait) 40  amb in hallway  -PH     Deviations/Abnormal Patterns (Gait) juliana decreased;gait speed decreased  -PH     Bilateral Gait Deviations forward flexed posture  -PH     [place] Level (Stairs) not tested  -PH     Comment, (Gait/Stairs) slow although fairly steady w/ no overt LOB  -PH               User Key  (r) = Recorded By, (t) = Taken By, (c) = Cosigned By      Initials Name Provider Type    PH Joanne Diez PTA Physical Therapist Assistant                   Obj/Interventions       Row Name 10/01/24 1056          Motor Skills    Therapeutic Exercise other (see comments)  BAP, LAQ, seated march; x 15 reps  -PH       Row Name 10/01/24 1056          Balance    Balance Assessment sitting static balance;standing static balance  -PH     Static Sitting Balance standby assist  -PH     Static Standing Balance contact guard  -PH     Position/Device Used, Standing Balance walker, front-wheeled  -PH               User Key  (r) = Recorded By, (t) = Taken By, (c) = Cosigned By      Initials Name Provider Type    PH Joanne Diez PTA Physical Therapist Assistant                   Goals/Plan    No documentation.                  Clinical Impression       Row Name  10/01/24 1056          Pain    Pretreatment Pain Rating 0/10 - no pain  -PH     Posttreatment Pain Rating 0/10 - no pain  -PH     Additional Documentation Pain Scale: Numbers Pre/Post-Treatment (Group)  -PH       Row Name 10/01/24 1056          Plan of Care Review    Plan of Care Reviewed With patient  -PH     Outcome Evaluation Pt was seen for PT tx this AM. Pt was in bed and awoken by this PTA although agreeable to therapy. Pt sat up to EOB w/ SBA. Pt stood req CGA. Pt amb into hallway w/ CGA and use of fww. Pt was slow w/ no overt LOB. Pt performed seated ther ex and was UIC at end of session w/ breakfast tray.  -PH       Row Name 10/01/24 1056          Vital Signs    O2 Delivery Pre Treatment room air  -PH     O2 Delivery Intra Treatment room air  -PH     O2 Delivery Post Treatment room air  -PH       Row Name 10/01/24 1056          Positioning and Restraints    Pre-Treatment Position in bed  -PH     Post Treatment Position chair  -PH     In Chair reclined;call light within reach;encouraged to call for assist;exit alarm on;notified ns  -               User Key  (r) = Recorded By, (t) = Taken By, (c) = Cosigned By      Initials Name Provider Type    PH Joanne Diez PTA Physical Therapist Assistant                   Outcome Measures       Row Name 10/01/24 1058 10/01/24 0856       How much help from another person do you currently need...    Turning from your back to your side while in flat bed without using bedrails? 4  -PH 4  -JM    Moving from lying on back to sitting on the side of a flat bed without bedrails? 3  -PH 3  -JM    Moving to and from a bed to a chair (including a wheelchair)? 3  -PH 3  -JM    Standing up from a chair using your arms (e.g., wheelchair, bedside chair)? 3  -PH 2  -JM    Climbing 3-5 steps with a railing? 2  -PH 2  -JM    To walk in hospital room? 3  -PH 2  -JM    AM-PAC 6 Clicks Score (PT) 18  -PH 16  -JM    Highest Level of Mobility Goal 6 --> Walk 10 steps or more  -PH  5 --> Static standing  -      Row Name 10/01/24 1058          Functional Assessment    Outcome Measure Options AM-PAC 6 Clicks Basic Mobility (PT)  -               User Key  (r) = Recorded By, (t) = Taken By, (c) = Cosigned By      Initials Name Provider Type    Romulo Church, RN Registered Nurse    PH Joanne Diez PTA Physical Therapist Assistant                                 Physical Therapy Education       Title: PT OT SLP Therapies (In Progress)       Topic: Physical Therapy (Done)       Point: Mobility training (Done)       Learning Progress Summary             Patient Acceptance, E,TB,D, VU,NR by  at 10/1/2024 1059    Acceptance, E,TB, VU by MS at 9/30/2024 1138                         Point: Home exercise program (Done)       Learning Progress Summary             Patient Acceptance, E,TB,D, VU,NR by  at 10/1/2024 1059    Acceptance, E,TB, VU by MS at 9/30/2024 1138                         Point: Body mechanics (Done)       Learning Progress Summary             Patient Acceptance, E,TB,D, VU,NR by  at 10/1/2024 1059    Acceptance, E,TB, VU by MS at 9/30/2024 1138                         Point: Precautions (Done)       Learning Progress Summary             Patient Acceptance, E,TB,D, VU,NR by  at 10/1/2024 1059    Acceptance, E,TB, VU by MS at 9/30/2024 1138                                         User Key       Initials Effective Dates Name Provider Type Discipline    MS 06/16/21 -  Ashlee Delaney PT Physical Therapist PT     06/16/21 -  Joanne Diez PTA Physical Therapist Assistant PT                  PT Recommendation and Plan     Plan of Care Reviewed With: patient  Outcome Evaluation: Pt was seen for PT tx this AM. Pt was in bed and awoken by this PTA although agreeable to therapy. Pt sat up to EOB w/ SBA. Pt stood req CGA. Pt amb into hallway w/ CGA and use of fww. Pt was slow w/ no overt LOB. Pt performed seated ther ex and was UIC at end of session w/  breakfast tray.     Time Calculation:         PT Charges       Row Name 10/01/24 1059             Time Calculation    Start Time 0743  -PH      Stop Time 0806  -PH      Time Calculation (min) 23 min  -PH      PT Received On 10/01/24  -PH      PT - Next Appointment 10/02/24  -PH         Timed Charges    37904 - PT Therapeutic Exercise Minutes 5  -PH      19708 - PT Therapeutic Activity Minutes 18  -PH         Total Minutes    Timed Charges Total Minutes 23  -PH       Total Minutes 23  -PH                User Key  (r) = Recorded By, (t) = Taken By, (c) = Cosigned By      Initials Name Provider Type    PH Joanne Diez PTA Physical Therapist Assistant                  Therapy Charges for Today       Code Description Service Date Service Provider Modifiers Qty    98901145340 HC PT THER PROC EA 15 MIN 10/1/2024 Joanne Diez PTA GP 1    07244230616 HC PT THERAPEUTIC ACT EA 15 MIN 10/1/2024 Joanne Diez PTA GP 1            PT G-Codes  Outcome Measure Options: AM-PAC 6 Clicks Basic Mobility (PT)  AM-PAC 6 Clicks Score (PT): 18  AM-PAC 6 Clicks Score (OT): 16  PT Discharge Summary  Anticipated Discharge Disposition (PT): skilled nursing facility    Joanne Diez PTA  10/1/2024

## 2024-10-01 NOTE — PROGRESS NOTES
Hospital Follow Up    LOS: 2  Patient Name: Dale Mann  Age/Sex: 83 y.o. male  : 1940  MRN: [id number]    Day of Service: 10/01/24   Length of Stay: 2  Encounter Provider: Bhanu Stearns MD  Place of Service: [facility] CARDIOLOGY  Patient Care Team:  Sebastian Larose MD as PCP - General (Family Medicine)  Toña Wesley APRN (Gastroenterology)  Dunia Cummings MD as Consulting Physician (Family Medicine)    Subjective:     Chief Complaint: Dizziness lightheadedness    Interval History: Patient says overall he is a little bit better.  He did ambulate with physical therapy today.    Objective:     Objective:  Temp:  [97.9 °F (36.6 °C)-98.1 °F (36.7 °C)] 98.1 °F (36.7 °C)  Heart Rate:  [56-65] 56  Resp:  [16-18] 16  BP: ()/(48-76) 105/48     Intake/Output Summary (Last 24 hours) at 10/1/2024 1018  Last data filed at 10/1/2024 0544  Gross per 24 hour   Intake 480 ml   Output 2450 ml   Net -1970 ml     Body mass index is 27.83 kg/m².      24  0734 24  0912   Weight: 83 kg (183 lb) 83 kg (183 lb)     Weight change:       Physical Exam:   General :  Alert, cooperative, in no acute distress.  Neuro:   Alert,cooperative and oriented.  Lungs:  CTAB. Normal respiratory effort and rate.  CV:  Regular rate and rhythm, normal S1 and S2, no murmurs, gallops or rubs.   ABD:  Soft, nontender, nondistended. Positive bowel sounds.  Extr:  No edema or cyanosis, moves all extremities.    Lab Review:   Results from last 7 days   Lab Units 10/01/24  0333 24  0317 24  0446 24  0711   SODIUM mmol/L 136 136   < > 136   POTASSIUM mmol/L 4.5 4.2   < > 4.7   CHLORIDE mmol/L 105 104   < > 102   CO2 mmol/L 25.0 25.0   < > 26.7   BUN mg/dL 18 19   < > 21   CREATININE mg/dL 1.47* 1.32*   < > 1.06   GLUCOSE mg/dL 84 81   < > 106*   CALCIUM mg/dL 8.7 8.8   < > 8.9   AST (SGOT) U/L  --   --   --  15   ALT (SGPT) U/L  --   --   --  11    < > = values in  "this interval not displayed.     Results from last 7 days   Lab Units 09/30/24  1243 09/29/24  0446 09/28/24  0926 09/28/24  0711   CK TOTAL U/L 43 46  --   --    HSTROP T ng/L  --   --  21 24*     Results from last 7 days   Lab Units 10/01/24  0333 09/30/24  0317   WBC 10*3/mm3 4.87 5.16   HEMOGLOBIN g/dL 12.0* 11.1*   HEMATOCRIT % 35.6* 32.4*   PLATELETS 10*3/mm3 116* 112*     Results from last 7 days   Lab Units 09/28/24  0626   INR  1.28*   APTT seconds 31.7     Results from last 7 days   Lab Units 09/28/24  0711   MAGNESIUM mg/dL 2.3           Invalid input(s): \"LDLCALC\"  Results from last 7 days   Lab Units 09/28/24  0711   PROBNP pg/mL 577.0     Results from last 7 days   Lab Units 09/29/24  0446   TSH uIU/mL 2.910       Current Medications:   Scheduled Meds:aspirin, 81 mg, Oral, Daily  atorvastatin, 40 mg, Oral, Nightly  folic acid, 1 mg, Oral, Daily  [Held by provider] furosemide, 40 mg, Oral, Daily  pantoprazole, 40 mg, Oral, Q AM  potassium chloride, 20 mEq, Oral, Daily  prasugrel, 5 mg, Oral, Daily  ranolazine, 1,000 mg, Oral, BID  sodium chloride, 10 mL, Intravenous, Q12H  tamsulosin, 0.4 mg, Oral, Nightly      Continuous Infusions:     Allergies:  Allergies   Allergen Reactions    Norvasc [Amlodipine Besylate] Anaphylaxis    Ticagrelor Anaphylaxis    Coumadin [Warfarin Sodium] Other (See Comments)     Excessive bleeding    Lipitor [Atorvastatin] Other (See Comments)     \"affected movement of heart\"    Clopidogrel Rash     Other reaction(s): rash    Enalapril Rash    Plavix [Clopidogrel Bisulfate] Rash       Assessment:       Frequent falls    Macrocytic anemia    CAD (coronary artery disease), native coronary artery    Orthostatic hypotension    CHF (congestive heart failure)    CANDY (acute kidney injury)    Neuropathy        Plan:   1.  Recurrent syncope.  At this point I think his carvedilol and his Imdur need to be discontinued.  His blood pressures remain borderline.  Will see if he has any " recurrent angina.  May want to stop his Ranexa to but we will see how he does first.  2.  History of coronary artery disease.  He is on multiple antianginals.  I went to stop some of them because his blood pressure is too low.  3.  Creatinine jumped overnight.  Appears that Lasix is currently on hold which I agree with.  This could also be a contributing factor.  4.  CHF echo yesterday showed low normal LV function with mild to moderate mitral regurgitation and grade 2 diastolic dysfunction.  Clinically he is not in heart failure.  5.  Will see how he does with the adjustment of his medications.        Bhanu Stearns MD  10/01/24  10:18 EDT

## 2024-10-01 NOTE — PROGRESS NOTES
Name: Dale Mann ADMIT: 2024   : 1940  PCP: Sebastian Larose MD    MRN: [id number] LOS: 2 days   AGE/SEX: 83 y.o. male  ROOM: [facility]     Subjective   Subjective   Is lying on bed and does not appear to have any major distress.   No new events overnight and denies nausea, vomiting, chest pain, shortness of breath.  No dizziness upon standing and ambulating with therapy.       Objective   Objective   Vital Signs  Temp:  [97.9 °F (36.6 °C)-98.1 °F (36.7 °C)] 98.1 °F (36.7 °C)  Heart Rate:  [56-65] 56  Resp:  [16-18] 16  BP: ()/(48-76) 105/48  SpO2:  [95 %-97 %] 96 %  on   ;   Device (Oxygen Therapy): room air  Body mass index is 27.83 kg/m².  Physical Exam  HEENT: PERRLA, extraocular movements intact, Scleras no icterus  Neck: Supple, no JVD  Cardiovascular: Regular rate and rhythm with normal S1 and S2  Respiratory: Fairly clear to auscultation bilaterally with no wheezes  GI: Soft, nontender, bowel sounds are present  Extremities: No edema, palpable pedal pulses  Neurologic: Grossly nonfocal, no facial asymmetry    Results Review     I reviewed the patient's new clinical results.  Results from last 7 days   Lab Units 10/01/24  0333 09/30/24  0317 09/29/24  0447 09/28/24  0626   WBC 10*3/mm3 4.87 5.16 5.47 6.16   HEMOGLOBIN g/dL 12.0* 11.1* 10.4* 12.6*   PLATELETS 10*3/mm3 116* 112* 107* 127*     Results from last 7 days   Lab Units 10/01/24  0333 09/30/24  0317 09/29/24  0446 09/28/24  0711   SODIUM mmol/L 136 136 139 136   POTASSIUM mmol/L 4.5 4.2 4.5 4.7   CHLORIDE mmol/L 105 104 105 102   CO2 mmol/L 25.0 25.0 27.0 26.7   BUN mg/dL 18 19 19 21   CREATININE mg/dL 1.47* 1.32* 1.39* 1.06   GLUCOSE mg/dL 84 81 77 106*   EGFR mL/min/1.73 47.6* 54.2* 50.9* 70.5     Results from last 7 days   Lab Units 10/01/24  0333 24  0711   ALBUMIN g/dL 3.3* 3.5   BILIRUBIN mg/dL  --  0.6   ALK PHOS U/L  --  75   AST (SGOT) U/L  --  15   ALT (SGPT) U/L  --  11     Results from last 7 days    Lab Units 10/01/24  0333 09/30/24  0317 09/29/24  0446 09/28/24  0711   CALCIUM mg/dL 8.7 8.8 8.7 8.9   ALBUMIN g/dL 3.3*  --   --  3.5   MAGNESIUM mg/dL  --   --   --  2.3   PHOSPHORUS mg/dL 2.6  --  2.7  --        Hemoglobin A1C   Date/Time Value Ref Range Status   09/29/2024 1417 4.60 (L) 4.80 - 5.60 % Final       No radiology results for the last day    I have personally reviewed all medications:  Scheduled Medications  aspirin, 81 mg, Oral, Daily  atorvastatin, 40 mg, Oral, Nightly  folic acid, 1 mg, Oral, Daily  pantoprazole, 40 mg, Oral, Q AM  prasugrel, 5 mg, Oral, Daily  ranolazine, 1,000 mg, Oral, BID  sodium chloride, 10 mL, Intravenous, Q12H  tamsulosin, 0.4 mg, Oral, Nightly    Infusions   Diet  Diet: Regular/House, Cardiac; Healthy Heart (2-3 Na+); Fluid Consistency: Thin (IDDSI 0)    I have personally reviewed:  [x]  Laboratory   [x]  Microbiology   [x]  Radiology   [x]  EKG/Telemetry  [x]  Cardiology/Vascular   []  Pathology    []  Records       Assessment/Plan     Active Hospital Problems    Diagnosis  POA    **Frequent falls [R29.6]  Not Applicable    Orthostatic hypotension [I95.1]  Unknown    CHF (congestive heart failure) [I50.9]  Unknown    CANDY (acute kidney injury) [N17.9]  Unknown    Neuropathy [G62.9]  Unknown    CAD (coronary artery disease), native coronary artery [I25.10]  Yes    Macrocytic anemia [D53.9]  Yes      Resolved Hospital Problems   No resolved problems to display.       1.Frequent falls/orthostasis/recurrent syncope, currently blood pressure is soft to low therefore Coreg, Lasix, Imdur were  held. Echo revealed ejection fraction of 49% with grade 2 diastolic dysfunction.  Cardiology following along as well.  Unfortunately may have to hold his medications upon discharge and his symptoms are improving.  Cardiology in agreement with the above.    2.  Neuropathy, extensive serologic workup has been initiated by neurology and tested negative for HIV and hepatitis.  TSH level, B12  and folate levels are normal.   EEG to rule out seizure activity given CT findings of left frontal encephalomalacia.  Neurology ordered MRI of the spine but unable to obtain secondary to cranial plate from prior surgery.  RPR was negative.  Cryoglobulin, Sjogren's antibody, DRISS/RA/C3/C4 are still pending.  Neurology to arrange EMG/NCS with Dr. Mayank Espinosa as an outpatient basis.    3.  Chronic heart failure, currently appears to be concentrated and secondary to soft low blood pressures Coreg and Lasix have been held.    4.  Coronary artery disease, currently chest pain-free and is on aspirin, Effient, Ranexa will be continued.  Held Coreg, isosorbide.    5. Macrocytic anemia, again B12 and folate levels are normal.  No evidence of any active bleeding and will follow-up with hematology as an outpatient basis.    6.  BPH, on Flomax.    7. Acute kidney injury, likely due to hypoperfusion and monitor ins and outs closely.   Creatinine has improved from 1.39-->1.32-->1.42.  Nephrology is following along.    8.  On SCDs for DVT prophylaxis.    9.  CODE STATUS is full code.     Estimated discharge date, 10-2-24     Copied text on this note has been reviewed by me on 10/1/2024      August Griffin MD  [facility]  10/01/24  13:03 EDT

## 2024-10-01 NOTE — PROGRESS NOTES
"   LOS: 2 days     Chief Complaint/ Reason for encounter: CKD management    Subjective   10/01/24 : Patient is doing well today with no new complaints  Good appetite with no nausea or vomiting  No shortness of breath chest pain or edema  Voiding well with no dysuria        Medical history reviewed:  History of Present Illness    Subjective    History taken from: Patient and chart    Vital Signs  Temp:  [97.9 °F (36.6 °C)-98.1 °F (36.7 °C)] 98.1 °F (36.7 °C)  Heart Rate:  [56-65] 56  Resp:  [16-18] 16  BP: ()/(48-76) 105/48       Wt Readings from Last 1 Encounters:   09/30/24 0912 83 kg (183 lb)   09/28/24 0734 83 kg (183 lb)       Objective:  Vital signs: (most recent): Blood pressure 105/48, pulse 56, temperature 98.1 °F (36.7 °C), temperature source Oral, resp. rate 16, height 172.7 cm (68\"), weight 83 kg (183 lb), SpO2 96%.                Objective:  General Appearance:  Comfortable, well-appearing, in no acute distress and not in pain.  Awake, alert  HEENT: Mucous membranes moist, no injury, oropharynx clear  Lungs:  Normal effort and normal respiratory rate.  Breath sounds clear to auscultation.  No  respiratory distress.  No rales, decreased breath sounds or rhonchi.    Heart: Normal rate.  Regular rhythm.  S1, S2 normal.  No murmur.   Abdomen: Abdomen is soft.  Bowel sounds are normal, no abdominal tenderness.  There is no rebound or guarding  Extremities: Trace edema of bilateral lower extremities  Skin:  Warm and dry with no rashes      Results Review:    Intake/Output:     Intake/Output Summary (Last 24 hours) at 10/1/2024 1105  Last data filed at 10/1/2024 1059  Gross per 24 hour   Intake 480 ml   Output 2750 ml   Net -2270 ml         DATA:  Radiology and Labs:  The following labs independently reviewed by me. Additional labs ordered for tomorrow a.m.  Interval notes, chart personally reviewed by me.   Old records independently reviewed showing history of CHF, baseline creatinine around 1.1  The " following radiologic studies independently viewed by me, findings echo with EF 49%  New problems include candy  Discussed with patient himself at bedside    Risk/ complexity of medical care/ medical decision making moderate complexity, CKD, CANDY CHF management    Labs:   Recent Results (from the past 24 hour(s))   Urinalysis With Microscopic If Indicated (No Culture) - Urine, Clean Catch    Collection Time: 09/30/24 12:00 PM    Specimen: Urine, Clean Catch   Result Value Ref Range    Color, UA Yellow Yellow, Straw    Appearance, UA Clear Clear    pH, UA 7.0 5.0 - 8.0    Specific Gravity, UA 1.007 1.005 - 1.030    Glucose, UA Negative Negative    Ketones, UA Negative Negative    Bilirubin, UA Negative Negative    Blood, UA Negative Negative    Protein, UA Negative Negative    Leuk Esterase, UA Negative Negative    Nitrite, UA Negative Negative    Urobilinogen, UA 2.0 E.U./dL (A) 0.2 - 1.0 E.U./dL   Sodium, Urine, Random - Urine, Clean Catch    Collection Time: 09/30/24 12:00 PM    Specimen: Urine, Clean Catch   Result Value Ref Range    Sodium, Urine 40 mmol/L   Protein, Urine, Random - Urine, Clean Catch    Collection Time: 09/30/24 12:00 PM    Specimen: Urine, Clean Catch   Result Value Ref Range    Total Protein, Urine 5.7 mg/dL   Creatinine Urine Random (kidney function) GFR component - Urine, Clean Catch    Collection Time: 09/30/24 12:00 PM    Specimen: Urine, Clean Catch   Result Value Ref Range    Creatinine, Urine 36.2 mg/dL   Chloride, Urine, Random - Urine, Clean Catch    Collection Time: 09/30/24 12:00 PM    Specimen: Urine, Clean Catch   Result Value Ref Range    Chloride, Urine <60 mmol/L   CK    Collection Time: 09/30/24 12:43 PM    Specimen: Blood   Result Value Ref Range    Creatine Kinase 43 20 - 200 U/L   Uric Acid    Collection Time: 10/01/24  3:33 AM    Specimen: Blood   Result Value Ref Range    Uric Acid 5.1 3.4 - 7.0 mg/dL   Renal Function Panel    Collection Time: 10/01/24  3:33 AM    Specimen:  Blood   Result Value Ref Range    Glucose 84 65 - 99 mg/dL    BUN 18 8 - 23 mg/dL    Creatinine 1.47 (H) 0.76 - 1.27 mg/dL    Sodium 136 136 - 145 mmol/L    Potassium 4.5 3.5 - 5.2 mmol/L    Chloride 105 98 - 107 mmol/L    CO2 25.0 22.0 - 29.0 mmol/L    Calcium 8.7 8.6 - 10.5 mg/dL    Albumin 3.3 (L) 3.5 - 5.2 g/dL    Phosphorus 2.6 2.5 - 4.5 mg/dL    Anion Gap 6.0 5.0 - 15.0 mmol/L    BUN/Creatinine Ratio 12.2 7.0 - 25.0    eGFR 47.6 (L) >60.0 mL/min/1.73   CBC Auto Differential    Collection Time: 10/01/24  3:33 AM    Specimen: Blood   Result Value Ref Range    WBC 4.87 3.40 - 10.80 10*3/mm3    RBC 3.50 (L) 4.14 - 5.80 10*6/mm3    Hemoglobin 12.0 (L) 13.0 - 17.7 g/dL    Hematocrit 35.6 (L) 37.5 - 51.0 %    .7 (H) 79.0 - 97.0 fL    MCH 34.3 (H) 26.6 - 33.0 pg    MCHC 33.7 31.5 - 35.7 g/dL    RDW 14.9 12.3 - 15.4 %    RDW-SD 55.4 (H) 37.0 - 54.0 fl    MPV 9.2 6.0 - 12.0 fL    Platelets 116 (L) 140 - 450 10*3/mm3    Neutrophil % 58.7 42.7 - 76.0 %    Lymphocyte % 27.5 19.6 - 45.3 %    Monocyte % 9.7 5.0 - 12.0 %    Eosinophil % 2.5 0.3 - 6.2 %    Basophil % 0.4 0.0 - 1.5 %    Immature Grans % 1.2 (H) 0.0 - 0.5 %    Neutrophils, Absolute 2.86 1.70 - 7.00 10*3/mm3    Lymphocytes, Absolute 1.34 0.70 - 3.10 10*3/mm3    Monocytes, Absolute 0.47 0.10 - 0.90 10*3/mm3    Eosinophils, Absolute 0.12 0.00 - 0.40 10*3/mm3    Basophils, Absolute 0.02 0.00 - 0.20 10*3/mm3    Immature Grans, Absolute 0.06 (H) 0.00 - 0.05 10*3/mm3    nRBC 0.0 0.0 - 0.2 /100 WBC       Radiology:  Pertinent radiology studies were reviewed as described above      Medications have been reviewed separately in chart overview      ASSESSMENT:  CANDY, slightly worse again today, euvolemic on exam, suspect due to low perfusion related to hypotension  Chronic diastolic CHF, EF low normal on echo  Anemia  CKD stage II, baseline creatinine around 1.1  Proteinuria on UA but concentrated specimen    Frequent falls        DISCUSSION/PLAN:   Creatinine  marginally higher again today  Remains euvolemic on exam  Cardiology recommendations noted, plans to monitor blood pressure off carvedilol and Imdur  Encourage oral fluids today  Consider restarting Lasix at a lower dose in a.m.  Bladder scan 0 mL, urine studies unremarkable  Hold potassium, follow-up a.m. labs        Mirza Kinney MD  [facility]   Office phone number: [phone]  Answering service phone number: [phone]    10/01/24  11:05 EDT    Dictation performed using Dragon dictation [employer]

## 2024-10-02 LAB
ALBUMIN SERPL-MCNC: 3.3 G/DL (ref 3.5–5.2)
ANION GAP SERPL CALCULATED.3IONS-SCNC: 8.5 MMOL/L (ref 5–15)
BASOPHILS # BLD AUTO: 0.02 10*3/MM3 (ref 0–0.2)
BASOPHILS NFR BLD AUTO: 0.4 % (ref 0–1.5)
BUN SERPL-MCNC: 19 MG/DL (ref 8–23)
BUN/CREAT SERPL: 15.7 (ref 7–25)
CALCIUM SPEC-SCNC: 9.1 MG/DL (ref 8.6–10.5)
CHLORIDE SERPL-SCNC: 103 MMOL/L (ref 98–107)
CO2 SERPL-SCNC: 25.5 MMOL/L (ref 22–29)
CREAT SERPL-MCNC: 1.21 MG/DL (ref 0.76–1.27)
DEPRECATED RDW RBC AUTO: 56.9 FL (ref 37–54)
EGFRCR SERPLBLD CKD-EPI 2021: 60.2 ML/MIN/1.73
EOSINOPHIL # BLD AUTO: 0.1 10*3/MM3 (ref 0–0.4)
EOSINOPHIL NFR BLD AUTO: 2.1 % (ref 0.3–6.2)
ERYTHROCYTE [DISTWIDTH] IN BLOOD BY AUTOMATED COUNT: 15.1 % (ref 12.3–15.4)
GLUCOSE SERPL-MCNC: 85 MG/DL (ref 65–99)
HCT VFR BLD AUTO: 35.4 % (ref 37.5–51)
HGB BLD-MCNC: 12 G/DL (ref 13–17.7)
IMM GRANULOCYTES # BLD AUTO: 0.05 10*3/MM3 (ref 0–0.05)
IMM GRANULOCYTES NFR BLD AUTO: 1 % (ref 0–0.5)
LYMPHOCYTES # BLD AUTO: 1.39 10*3/MM3 (ref 0.7–3.1)
LYMPHOCYTES NFR BLD AUTO: 29.1 % (ref 19.6–45.3)
MCH RBC QN AUTO: 34.6 PG (ref 26.6–33)
MCHC RBC AUTO-ENTMCNC: 33.9 G/DL (ref 31.5–35.7)
MCV RBC AUTO: 102 FL (ref 79–97)
MONOCYTES # BLD AUTO: 0.58 10*3/MM3 (ref 0.1–0.9)
MONOCYTES NFR BLD AUTO: 12.1 % (ref 5–12)
NEUTROPHILS NFR BLD AUTO: 2.64 10*3/MM3 (ref 1.7–7)
NEUTROPHILS NFR BLD AUTO: 55.3 % (ref 42.7–76)
NRBC BLD AUTO-RTO: 0 /100 WBC (ref 0–0.2)
PHOSPHATE SERPL-MCNC: 2.6 MG/DL (ref 2.5–4.5)
PLATELET # BLD AUTO: 118 10*3/MM3 (ref 140–450)
PMV BLD AUTO: 9.7 FL (ref 6–12)
POTASSIUM SERPL-SCNC: 4.2 MMOL/L (ref 3.5–5.2)
RBC # BLD AUTO: 3.47 10*6/MM3 (ref 4.14–5.8)
SODIUM SERPL-SCNC: 137 MMOL/L (ref 136–145)
WBC NRBC COR # BLD AUTO: 4.78 10*3/MM3 (ref 3.4–10.8)

## 2024-10-02 PROCEDURE — 97530 THERAPEUTIC ACTIVITIES: CPT

## 2024-10-02 PROCEDURE — 85025 COMPLETE CBC W/AUTO DIFF WBC: CPT | Performed by: INTERNAL MEDICINE

## 2024-10-02 PROCEDURE — 99232 SBSQ HOSP IP/OBS MODERATE 35: CPT | Performed by: NURSE PRACTITIONER

## 2024-10-02 PROCEDURE — 97116 GAIT TRAINING THERAPY: CPT

## 2024-10-02 PROCEDURE — 80069 RENAL FUNCTION PANEL: CPT | Performed by: INTERNAL MEDICINE

## 2024-10-02 RX ADMIN — FOLIC ACID 1 MG: 1 TABLET ORAL at 08:21

## 2024-10-02 RX ADMIN — PRASUGREL 5 MG: 10 TABLET, FILM COATED ORAL at 08:21

## 2024-10-02 RX ADMIN — ATORVASTATIN CALCIUM 40 MG: 20 TABLET, FILM COATED ORAL at 20:09

## 2024-10-02 RX ADMIN — RANOLAZINE 1000 MG: 500 TABLET, FILM COATED, EXTENDED RELEASE ORAL at 08:21

## 2024-10-02 RX ADMIN — TAMSULOSIN HYDROCHLORIDE 0.4 MG: 0.4 CAPSULE ORAL at 20:09

## 2024-10-02 RX ADMIN — RANOLAZINE 1000 MG: 500 TABLET, FILM COATED, EXTENDED RELEASE ORAL at 20:09

## 2024-10-02 RX ADMIN — ASPIRIN 81 MG: 81 TABLET, COATED ORAL at 08:21

## 2024-10-02 RX ADMIN — PANTOPRAZOLE SODIUM 40 MG: 40 TABLET, DELAYED RELEASE ORAL at 05:20

## 2024-10-02 RX ADMIN — Medication 10 ML: at 08:24

## 2024-10-02 RX ADMIN — Medication 10 ML: at 20:10

## 2024-10-02 NOTE — PROGRESS NOTES
"[facility]    Patient Name: Dale Mann  :1940  83 y.o.  LOS: 3  Encounter Provider: ZENAIDA Corey      Patient Care Team:  Sebastian Larose MD as PCP - General (Family Medicine)  Toña Wesley APRN (Gastroenterology)  Dunia Cummings MD as Consulting Physician (Family Medicine)    Chief Complaint:  Follow up dizziness & lightheadedness    Interval History: Dizziness and lightheadedness has improved with discontinuation of carvedilol and Imdur.  Denies anginal chest pain       Objective   Vital Signs  Temp:  [97.5 °F (36.4 °C)-97.8 °F (36.6 °C)] 97.7 °F (36.5 °C)  Heart Rate:  [57-89] 89  Resp:  [15-18] 18  BP: (112-123)/(53-58) 119/57    Intake/Output Summary (Last 24 hours) at 10/2/2024 0926  Last data filed at 10/2/2024 0759  Gross per 24 hour   Intake --   Output 3850 ml   Net -3850 ml     Flowsheet Rows      Flowsheet Row First Filed Value   Admission Height 172.7 cm (68\") Documented at 2024 0734   Admission Weight 83 kg (183 lb) Documented at 2024 0734              Constitutional:       Appearance: Normal appearance. Well-developed.   Eyes:      Conjunctiva/sclera: Conjunctivae normal.   Neck:      Vascular: No carotid bruit.   Pulmonary:      Effort: Pulmonary effort is normal.      Breath sounds: Normal breath sounds.   Cardiovascular:      Normal rate. Regular rhythm. Normal S1. Normal S2.       Murmurs: There is no murmur.      No gallop.  No click. No rub.   Edema:     Peripheral edema absent.   Musculoskeletal: Normal range of motion. Skin:     General: Skin is warm and dry.   Neurological:      Mental Status: Alert and oriented to person, place, and time.      GCS: GCS eye subscore is 4. GCS verbal subscore is 5. GCS motor subscore is 6.   Psychiatric:         Speech: Speech normal.         Behavior: Behavior normal.         Thought Content: Thought content normal.         Judgment: Judgment normal.           Pertinent Test " "Results:  Results from last 7 days   Lab Units 10/02/24  0313 10/01/24  0333 09/30/24  0317 09/29/24 0446 09/28/24  0711   SODIUM mmol/L 137 136 136 139 136   POTASSIUM mmol/L 4.2 4.5 4.2 4.5 4.7   CHLORIDE mmol/L 103 105 104 105 102   CO2 mmol/L 25.5 25.0 25.0 27.0 26.7   BUN mg/dL 19 18 19 19 21   CREATININE mg/dL 1.21 1.47* 1.32* 1.39* 1.06   GLUCOSE mg/dL 85 84 81 77 106*   CALCIUM mg/dL 9.1 8.7 8.8 8.7 8.9   AST (SGOT) U/L  --   --   --   --  15   ALT (SGPT) U/L  --   --   --   --  11     Results from last 7 days   Lab Units 09/30/24  1243 09/29/24 0446 09/28/24  0926 09/28/24  0711   CK TOTAL U/L 43 46  --   --    HSTROP T ng/L  --   --  21 24*     Results from last 7 days   Lab Units 10/02/24  0313 10/01/24  0333 09/30/24  0317 09/29/24  0447 09/28/24  0626   WBC 10*3/mm3 4.78 4.87 5.16 5.47 6.16   HEMOGLOBIN g/dL 12.0* 12.0* 11.1* 10.4* 12.6*   HEMATOCRIT % 35.4* 35.6* 32.4* 32.2* 36.8*   PLATELETS 10*3/mm3 118* 116* 112* 107* 127*     Results from last 7 days   Lab Units 09/28/24  0626   INR  1.28*   APTT seconds 31.7     Results from last 7 days   Lab Units 09/28/24  0711   MAGNESIUM mg/dL 2.3           Invalid input(s): \"LDLCALC\"  Results from last 7 days   Lab Units 09/28/24  0711   PROBNP pg/mL 577.0     Results from last 7 days   Lab Units 09/29/24  0446   TSH uIU/mL 2.910           Medication Review:   aspirin, 81 mg, Oral, Daily  atorvastatin, 40 mg, Oral, Nightly  folic acid, 1 mg, Oral, Daily  pantoprazole, 40 mg, Oral, Q AM  prasugrel, 5 mg, Oral, Daily  ranolazine, 1,000 mg, Oral, BID  sodium chloride, 10 mL, Intravenous, Q12H  tamsulosin, 0.4 mg, Oral, Nightly              Assessment & Plan     Active Hospital Problems    Diagnosis  POA    **Frequent falls [R29.6]  Not Applicable    Orthostatic hypotension [I95.1]  Unknown    CHF (congestive heart failure) [I50.9]  Unknown    CANDY (acute kidney injury) [N17.9]  Unknown    Neuropathy [G62.9]  Unknown    CAD (coronary artery disease), native " coronary artery [I25.10]  Yes    Macrocytic anemia [D53.9]  Yes      Resolved Hospital Problems   No resolved problems to display.        Recurrent syncope  Carvedilol and isosorbide mononitrate were discontinued yesterday secondary to soft blood pressures and episodes of dizziness and light headedness have improved  History of CAD  Denies recurrent angina  Continue Ranexa, consider holding if dizziness continues  CANDY  Creatinine had elevated but is now back to baseline after holding furosemide  Diastolic CHF  Preserved LVEF, grade 2 diastolic dysfunction with moderate mitral valve regurgitation  No clinical signs of CHF  BP soft    Patient stable from cardiovascular standpoint.  We will sign off.  Patient will need follow-up in clinic in approximately 1 month.  My office will arrange.           ZENAIDA Corey  [facility]   [facility]  [address] [place] [address]  Office: [phone]    10/02/24  09:26 EDT

## 2024-10-02 NOTE — CASE MANAGEMENT/SOCIAL WORK
Continued Stay Note  [facility]     Patient Name: Dale Mann  MRN: [id number]  Today's Date: 10/2/2024    Admit Date: 9/28/2024    Plan: Home, with home health, family to transport   Discharge Plan       Row Name 10/02/24 1527       Plan    Plan Home, with home health, family to transport    Plan Comments [facility] spoke with daughter, Satinder, and informed that [facility] has declined pt. Daughter stated that she would take home and that she wanted [facility] to follow pt. She stated that pt is more mobile and has improved more that what she anticipated. Referral placed to [facility] ([facility]). and informed Sobeida STRATTON/[facility].                   Discharge Codes    No documentation.                 Expected Discharge Date and Time       Expected Discharge Date Expected Discharge Time    Oct 3, 2024               Dang James RN
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
## 2024-10-02 NOTE — PROGRESS NOTES
"   LOS: 3 days     Chief Complaint/ Reason for encounter: CKD management    Subjective   10/01/24 : Patient is doing well today with no new complaints  Good appetite with no nausea or vomiting  No shortness of breath chest pain or edema  Voiding well with no dysuria    10/02 no acute events. Patient feeling well. No sob or chest pain. Laying flat on his back with any distress/SOB    Medical history reviewed:  History of Present Illness    Subjective    History taken from: Patient and chart    Vital Signs  Temp:  [97.5 °F (36.4 °C)-97.8 °F (36.6 °C)] 97.7 °F (36.5 °C)  Heart Rate:  [57-89] 89  Resp:  [15-18] 18  BP: (112-123)/(53-58) 119/57       Wt Readings from Last 1 Encounters:   09/30/24 0912 83 kg (183 lb)   09/28/24 0734 83 kg (183 lb)       Objective:  Vital signs: (most recent): Blood pressure 119/57, pulse 89, temperature 97.7 °F (36.5 °C), temperature source Oral, resp. rate 18, height 172.7 cm (68\"), weight 83 kg (183 lb), SpO2 97%.                Objective:  General Appearance:  Comfortable, well-appearing, in no acute distress and not in pain.  Awake, alert  HEENT: Mucous membranes moist, no injury, oropharynx clear  Lungs:  Normal effort and normal respiratory rate.  Breath sounds clear to auscultation.  No  respiratory distress.  No rales, decreased breath sounds or rhonchi.    Heart: Normal rate.  Regular rhythm.  S1, S2 normal.  No murmur.   Abdomen: Abdomen is soft.  Bowel sounds are normal, no abdominal tenderness.  There is no rebound or guarding  Extremities: Trace edema of bilateral lower extremities  Skin:  Warm and dry with no rashes      Results Review:    Intake/Output:     Intake/Output Summary (Last 24 hours) at 10/2/2024 0836  Last data filed at 10/2/2024 0759  Gross per 24 hour   Intake --   Output 3850 ml   Net -3850 ml         DATA:  Radiology and Labs:  The following labs independently reviewed by me. Additional labs ordered for tomorrow a.m.  Interval notes, chart personally " reviewed by me.   Old records independently reviewed showing history of CHF, baseline creatinine around 1.1  The following radiologic studies independently viewed by me, findings echo with EF 49%  New problems include candy  Discussed with patient himself at bedside    Risk/ complexity of medical care/ medical decision making moderate complexity, CKD, CANDY CHF management    Labs:   Recent Results (from the past 24 hour(s))   Renal Function Panel    Collection Time: 10/02/24  3:13 AM    Specimen: Blood   Result Value Ref Range    Glucose 85 65 - 99 mg/dL    BUN 19 8 - 23 mg/dL    Creatinine 1.21 0.76 - 1.27 mg/dL    Sodium 137 136 - 145 mmol/L    Potassium 4.2 3.5 - 5.2 mmol/L    Chloride 103 98 - 107 mmol/L    CO2 25.5 22.0 - 29.0 mmol/L    Calcium 9.1 8.6 - 10.5 mg/dL    Albumin 3.3 (L) 3.5 - 5.2 g/dL    Phosphorus 2.6 2.5 - 4.5 mg/dL    Anion Gap 8.5 5.0 - 15.0 mmol/L    BUN/Creatinine Ratio 15.7 7.0 - 25.0    eGFR 60.2 >60.0 mL/min/1.73   CBC Auto Differential    Collection Time: 10/02/24  3:13 AM    Specimen: Blood   Result Value Ref Range    WBC 4.78 3.40 - 10.80 10*3/mm3    RBC 3.47 (L) 4.14 - 5.80 10*6/mm3    Hemoglobin 12.0 (L) 13.0 - 17.7 g/dL    Hematocrit 35.4 (L) 37.5 - 51.0 %    .0 (H) 79.0 - 97.0 fL    MCH 34.6 (H) 26.6 - 33.0 pg    MCHC 33.9 31.5 - 35.7 g/dL    RDW 15.1 12.3 - 15.4 %    RDW-SD 56.9 (H) 37.0 - 54.0 fl    MPV 9.7 6.0 - 12.0 fL    Platelets 118 (L) 140 - 450 10*3/mm3    Neutrophil % 55.3 42.7 - 76.0 %    Lymphocyte % 29.1 19.6 - 45.3 %    Monocyte % 12.1 (H) 5.0 - 12.0 %    Eosinophil % 2.1 0.3 - 6.2 %    Basophil % 0.4 0.0 - 1.5 %    Immature Grans % 1.0 (H) 0.0 - 0.5 %    Neutrophils, Absolute 2.64 1.70 - 7.00 10*3/mm3    Lymphocytes, Absolute 1.39 0.70 - 3.10 10*3/mm3    Monocytes, Absolute 0.58 0.10 - 0.90 10*3/mm3    Eosinophils, Absolute 0.10 0.00 - 0.40 10*3/mm3    Basophils, Absolute 0.02 0.00 - 0.20 10*3/mm3    Immature Grans, Absolute 0.05 0.00 - 0.05 10*3/mm3    nRBC 0.0  0.0 - 0.2 /100 WBC       Radiology:  Pertinent radiology studies were reviewed as described above      Medications have been reviewed separately in chart overview      ASSESSMENT:  CANDY, slightly worse again today, euvolemic on exam, suspect due to low perfusion related to hypotension  Chronic diastolic CHF, EF low normal on echo  Anemia  CKD stage II, baseline creatinine around 1.1  Proteinuria on UA but concentrated specimen    Frequent falls        DISCUSSION/PLAN:   Renal function better today.   Remains euvolemic on exam  Cardiology recommendations noted, plans to monitor blood pressure off carvedilol and Imdur  Consider restarting Lasix at a lower dose: 20 mg daily   Bladder scan 0 mL, urine studies unremarkable  K at goal. No need for KCl at this time.       Viktoria Spring MD  [facility]   Office phone number: [phone]  Answering service phone number: [phone]    10/02/24  08:52 EDT

## 2024-10-02 NOTE — PROGRESS NOTES
Name: Dale Mann ADMIT: 2024   : 1940  PCP: Sebastian Larose MD    MRN: [id number] LOS: 3 days   AGE/SEX: 83 y.o. male  ROOM: [facility]     Subjective   Subjective   Patient is lying on bed and does not appear to have any major distress.   No new events overnight and denies nausea, vomiting, chest pain, shortness of breath.  States he does get mildly dizzy upon standing and ambulating but no sensation of passing out.       Objective   Objective   Vital Signs  Temp:  [97.5 °F (36.4 °C)-97.8 °F (36.6 °C)] 97.7 °F (36.5 °C)  Heart Rate:  [57-89] 89  Resp:  [15-18] 18  BP: (112-123)/(53-58) 119/57  SpO2:  [97 %-100 %] 97 %  on   ;   Device (Oxygen Therapy): room air  Body mass index is 27.83 kg/m².  Physical Exam  HEENT: PERRLA, extraocular movements intact, Scleras no icterus  Neck: Supple, no JVD  Cardiovascular: Regular rate and rhythm with normal S1 and S2  Respiratory: Fairly clear to auscultation bilaterally with no wheezes  GI: Soft, nontender, bowel sounds are present  Extremities: No edema, palpable pedal pulses  Neurologic: Grossly nonfocal, no facial asymmetry    Results Review     I reviewed the patient's new clinical results.  Results from last 7 days   Lab Units 10/02/24  0313 10/01/24  0333 09/30/24  0317 09/29/24  0447   WBC 10*3/mm3 4.78 4.87 5.16 5.47   HEMOGLOBIN g/dL 12.0* 12.0* 11.1* 10.4*   PLATELETS 10*3/mm3 118* 116* 112* 107*     Results from last 7 days   Lab Units 10/02/24  0313 10/01/24  0333 09/30/24  0317 09/29/24  044   SODIUM mmol/L 137 136 136 139   POTASSIUM mmol/L 4.2 4.5 4.2 4.5   CHLORIDE mmol/L 103 105 104 105   CO2 mmol/L 25.5 25.0 25.0 27.0   BUN mg/dL 19 18 19 19   CREATININE mg/dL 1.21 1.47* 1.32* 1.39*   GLUCOSE mg/dL 85 84 81 77   EGFR mL/min/1.73 60.2 47.6* 54.2* 50.9*     Results from last 7 days   Lab Units 10/02/24  0313 10/01/24  0333 24  0711   ALBUMIN g/dL 3.3* 3.3* 3.5   BILIRUBIN mg/dL  --   --  0.6   ALK PHOS U/L  --   --  75    AST (SGOT) U/L  --   --  15   ALT (SGPT) U/L  --   --  11     Results from last 7 days   Lab Units 10/02/24  0313 10/01/24  0333 09/30/24  0317 09/29/24  0446 09/28/24  0711   CALCIUM mg/dL 9.1 8.7 8.8 8.7 8.9   ALBUMIN g/dL 3.3* 3.3*  --   --  3.5   MAGNESIUM mg/dL  --   --   --   --  2.3   PHOSPHORUS mg/dL 2.6 2.6  --  2.7  --        Hemoglobin A1C   Date/Time Value Ref Range Status   09/29/2024 1417 4.60 (L) 4.80 - 5.60 % Final       No radiology results for the last day    I have personally reviewed all medications:  Scheduled Medications  aspirin, 81 mg, Oral, Daily  atorvastatin, 40 mg, Oral, Nightly  folic acid, 1 mg, Oral, Daily  pantoprazole, 40 mg, Oral, Q AM  prasugrel, 5 mg, Oral, Daily  ranolazine, 1,000 mg, Oral, BID  sodium chloride, 10 mL, Intravenous, Q12H  tamsulosin, 0.4 mg, Oral, Nightly    Infusions   Diet  Diet: Regular/House, Cardiac; Healthy Heart (2-3 Na+); Fluid Consistency: Thin (IDDSI 0)    I have personally reviewed:  [x]  Laboratory   [x]  Microbiology   [x]  Radiology   [x]  EKG/Telemetry  [x]  Cardiology/Vascular   []  Pathology    []  Records       Assessment/Plan     Active Hospital Problems    Diagnosis  POA    **Frequent falls [R29.6]  Not Applicable    Orthostatic hypotension [I95.1]  Unknown    CHF (congestive heart failure) [I50.9]  Unknown    CANDY (acute kidney injury) [N17.9]  Unknown    Neuropathy [G62.9]  Unknown    CAD (coronary artery disease), native coronary artery [I25.10]  Yes    Macrocytic anemia [D53.9]  Yes      Resolved Hospital Problems   No resolved problems to display.       1.Frequent falls/orthostasis/recurrent syncope, currently blood pressure is soft to low therefore Coreg, Lasix, Imdur were  held. Echo revealed ejection fraction of 49% with grade 2 diastolic dysfunction.  Cardiology following along as well.  Unfortunately may have to hold his medications upon discharge and his symptoms are improving.  Cardiology in agreement with the above.    2.   Neuropathy, extensive serologic workup has been initiated by neurology and tested negative for HIV and hepatitis.  TSH level, B12 and folate levels are normal.   EEG to rule out seizure activity given CT findings of left frontal encephalomalacia.  Neurology ordered MRI of the spine but unable to obtain secondary to cranial plate from prior surgery.  RPR was negative.  Cryoglobulin, Sjogren's antibody, DRISS/RA/C3/C4 are still pending.  Neurology to arrange EMG/NCS with Dr. Mayank Espinosa as an outpatient basis.    3.  Chronic heart failure, currently appears to be concentrated and secondary to soft low blood pressures Coreg and Lasix have been held.    4.  Coronary artery disease, currently chest pain-free and is on aspirin, Effient, Ranexa will be continued.  Held Coreg, isosorbide.    5. Macrocytic anemia, again B12 and folate levels are normal.  No evidence of any active bleeding and will follow-up with hematology as an outpatient basis.    6.  BPH, on Flomax.    7. Acute kidney injury, likely due to hypoperfusion and monitor ins and outs closely.   Creatinine has improved from 1.39-->1.32-->1.42-->1.21.  Nephrology is following along.    8.  On SCDs for DVT prophylaxis.    9.  CODE STATUS is full code.    10.  Disposition, to rehab once a bed is available.     Estimated discharge date, 10-2-24     Copied text on this note has been reviewed by me on 10/2/2024      August Griffin MD  [facility]  10/02/24  12:39 EDT

## 2024-10-02 NOTE — PLAN OF CARE
Goal Outcome Evaluation:  Plan of Care Reviewed With: patient        Progress: improving  Outcome Evaluation: Pt has been AOX4, pleasant. Pt urine output good. Pt BP and HR are much better after d/c lasix and coreg. Pt participated in PT yesterday. Pt is feeling better. Cardiology adjusting meds. Pt call out appropriatly for urinal. Gait unsteady. Proably home soon. CTM, safety maintained.

## 2024-10-02 NOTE — PLAN OF CARE
Goal Outcome Evaluation:         Pt not complaining of dizziness today. Cards changed meds and pt more active. Plans to discharge home tomorrow. Plan of care ongoing

## 2024-10-02 NOTE — PLAN OF CARE
Goal Outcome Evaluation:  Plan of Care Reviewed With: patient        Progress: improving  Outcome Evaluation: Pt tolerated treatment well this date. Required SBA to stand from chair. Pt ambulated 300ft w/ Rw and CGA. No LOBs noted, though slow pace throughout. Encouraged pt to ambulate w/ nsg during the day.      Anticipated Discharge Disposition (PT): home with home health

## 2024-10-03 ENCOUNTER — READMISSION MANAGEMENT (OUTPATIENT)
Dept: CALL CENTER | Facility: HOSPITAL | Age: 84
End: 2024-10-03
Payer: MEDICARE

## 2024-10-03 VITALS
HEART RATE: 60 BPM | WEIGHT: 183 LBS | TEMPERATURE: 97.9 F | DIASTOLIC BLOOD PRESSURE: 53 MMHG | OXYGEN SATURATION: 100 % | SYSTOLIC BLOOD PRESSURE: 115 MMHG | BODY MASS INDEX: 27.74 KG/M2 | HEIGHT: 68 IN | RESPIRATION RATE: 17 BRPM

## 2024-10-03 LAB
ALBUMIN SERPL-MCNC: 3.1 G/DL (ref 3.5–5.2)
ANION GAP SERPL CALCULATED.3IONS-SCNC: 5.8 MMOL/L (ref 5–15)
BASOPHILS # BLD AUTO: 0.02 10*3/MM3 (ref 0–0.2)
BASOPHILS NFR BLD AUTO: 0.4 % (ref 0–1.5)
BUN SERPL-MCNC: 23 MG/DL (ref 8–23)
BUN/CREAT SERPL: 18.3 (ref 7–25)
CALCIUM SPEC-SCNC: 8.6 MG/DL (ref 8.6–10.5)
CHLORIDE SERPL-SCNC: 106 MMOL/L (ref 98–107)
CO2 SERPL-SCNC: 23.2 MMOL/L (ref 22–29)
CREAT SERPL-MCNC: 1.26 MG/DL (ref 0.76–1.27)
DEPRECATED RDW RBC AUTO: 55.5 FL (ref 37–54)
EGFRCR SERPLBLD CKD-EPI 2021: 57.3 ML/MIN/1.73
EOSINOPHIL # BLD AUTO: 0.1 10*3/MM3 (ref 0–0.4)
EOSINOPHIL NFR BLD AUTO: 1.9 % (ref 0.3–6.2)
ERYTHROCYTE [DISTWIDTH] IN BLOOD BY AUTOMATED COUNT: 14.9 % (ref 12.3–15.4)
GLUCOSE SERPL-MCNC: 84 MG/DL (ref 65–99)
HCT VFR BLD AUTO: 34.8 % (ref 37.5–51)
HGB BLD-MCNC: 11.6 G/DL (ref 13–17.7)
IMM GRANULOCYTES # BLD AUTO: 0.05 10*3/MM3 (ref 0–0.05)
IMM GRANULOCYTES NFR BLD AUTO: 1 % (ref 0–0.5)
LYMPHOCYTES # BLD AUTO: 1.54 10*3/MM3 (ref 0.7–3.1)
LYMPHOCYTES NFR BLD AUTO: 30 % (ref 19.6–45.3)
MCH RBC QN AUTO: 34.1 PG (ref 26.6–33)
MCHC RBC AUTO-ENTMCNC: 33.3 G/DL (ref 31.5–35.7)
MCV RBC AUTO: 102.4 FL (ref 79–97)
MONOCYTES # BLD AUTO: 0.55 10*3/MM3 (ref 0.1–0.9)
MONOCYTES NFR BLD AUTO: 10.7 % (ref 5–12)
NEUTROPHILS NFR BLD AUTO: 2.88 10*3/MM3 (ref 1.7–7)
NEUTROPHILS NFR BLD AUTO: 56 % (ref 42.7–76)
NRBC BLD AUTO-RTO: 0 /100 WBC (ref 0–0.2)
PHOSPHATE SERPL-MCNC: 2.5 MG/DL (ref 2.5–4.5)
PLATELET # BLD AUTO: 119 10*3/MM3 (ref 140–450)
PMV BLD AUTO: 9.3 FL (ref 6–12)
POTASSIUM SERPL-SCNC: 4.2 MMOL/L (ref 3.5–5.2)
RBC # BLD AUTO: 3.4 10*6/MM3 (ref 4.14–5.8)
SODIUM SERPL-SCNC: 135 MMOL/L (ref 136–145)
WBC NRBC COR # BLD AUTO: 5.14 10*3/MM3 (ref 3.4–10.8)

## 2024-10-03 PROCEDURE — 85025 COMPLETE CBC W/AUTO DIFF WBC: CPT | Performed by: INTERNAL MEDICINE

## 2024-10-03 PROCEDURE — 80069 RENAL FUNCTION PANEL: CPT | Performed by: INTERNAL MEDICINE

## 2024-10-03 PROCEDURE — 97110 THERAPEUTIC EXERCISES: CPT

## 2024-10-03 PROCEDURE — 97530 THERAPEUTIC ACTIVITIES: CPT

## 2024-10-03 RX ADMIN — FOLIC ACID 1 MG: 1 TABLET ORAL at 09:34

## 2024-10-03 RX ADMIN — RANOLAZINE 1000 MG: 500 TABLET, FILM COATED, EXTENDED RELEASE ORAL at 09:34

## 2024-10-03 RX ADMIN — ASPIRIN 81 MG: 81 TABLET, COATED ORAL at 09:34

## 2024-10-03 RX ADMIN — PRASUGREL 5 MG: 10 TABLET, FILM COATED ORAL at 09:34

## 2024-10-03 RX ADMIN — PANTOPRAZOLE SODIUM 40 MG: 40 TABLET, DELAYED RELEASE ORAL at 06:51

## 2024-10-03 RX ADMIN — Medication 10 ML: at 09:33

## 2024-10-03 NOTE — CASE MANAGEMENT/SOCIAL WORK
Case Management Discharge Note      Final Note: home with HH    Provided Post Acute Provider List?: N/A  Provided Post Acute Provider Quality & Resource List?: N/A    Selected Continued Care - Discharged on 10/3/2024 Admission date: 9/28/2024 - Discharge disposition: Home or Self Care      Destination    No services have been selected for the patient.                Durable Medical Equipment    No services have been selected for the patient.                Dialysis/Infusion    No services have been selected for the patient.                Home Medical Care    No services have been selected for the patient.                Therapy    No services have been selected for the patient.                Community Resources    No services have been selected for the patient.                Community & DME    No services have been selected for the patient.                    Transportation Services  Private: Car    Final Discharge Disposition Code: 06 - home with home health care

## 2024-10-03 NOTE — CASE MANAGEMENT/SOCIAL WORK
Continued Stay Note  [facility]     Patient Name: Dale Mann  MRN: [id number]  Today's Date: 10/3/2024    Admit Date: 9/28/2024    Plan: Home with [facility], family to transport   Discharge Plan       Row Name 10/03/24 1420       Plan    Plan Home with [facility], family to transport    Plan Comments Pt to be dc home with [facility], contacted Sobeida W/[facility] and informed of dc. Family to transport.                   Discharge Codes    No documentation.                 Expected Discharge Date and Time       Expected Discharge Date Expected Discharge Time    Oct 3, 2024               Dang James RN

## 2024-10-03 NOTE — CASE MANAGEMENT/SOCIAL WORK
Case Management Discharge Note      Final Note: Home, family to transport. Home health will not start in the next 72 hours, home health pending at this time due to lack of provider coverage. Family has appointment arranged mid October. Home health will follow for orders/PCP coverage.    Provided Post Acute Provider List?: N/A  Provided Post Acute Provider Quality & Resource List?: N/A    Selected Continued Care - Discharged on 10/3/2024 Admission date: 9/28/2024 - Discharge disposition: Home or Self Care      Destination    No services have been selected for the patient.                Durable Medical Equipment    No services have been selected for the patient.                Dialysis/Infusion    No services have been selected for the patient.                Home Medical Care    No services have been selected for the patient.                Therapy    No services have been selected for the patient.                Community Resources    No services have been selected for the patient.                Community & DME    No services have been selected for the patient.                    Transportation Services  Private: Car    Final Discharge Disposition Code: 01 - home or self-care

## 2024-10-03 NOTE — PLAN OF CARE
Goal Outcome Evaluation:  Plan of Care Reviewed With: patient        Progress: improving  Outcome Evaluation: Pt agreed to PT session, pt iman bed mob CGA/SBA 1, slight post lean, pt iman STS assist of 1, pt iman amb ~150ft cg/SBA rwx, pt reports no concerns, a few steps to enter , but has rail, dtr to assist , DC possibly today      Anticipated Discharge Disposition (PT): home with assist, home with home health

## 2024-10-03 NOTE — DISCHARGE SUMMARY
Patient Name: Dale Mann  : 1940  MRN: [id number]    Date of Admission: 2024  Date of Discharge:  10/3/2024  Primary Care Physician: Sebastian Larose MD      Chief Complaint:   Fall and Vomiting      Discharge Diagnoses     Active Hospital Problems    Diagnosis  POA    **Frequent falls [R29.6]  Not Applicable    Orthostatic hypotension [I95.1]  Unknown    CHF (congestive heart failure) [I50.9]  Unknown    CADNY (acute kidney injury) [N17.9]  Unknown    Neuropathy [G62.9]  Unknown    CAD (coronary artery disease), native coronary artery [I25.10]  Yes    Macrocytic anemia [D53.9]  Yes      Resolved Hospital Problems   No resolved problems to display.        Hospital Course   History comes from merrily from the patient as well as his daughter at bedside.  They both note that he has been having issues with falls which has been worsening for the last 2 to 3 years.  We have minimal records as to any previous workup available.  His daughter states that she has been told that he has orthostatic hypotension.  Aside for that she is unaware of what the workup is involved.  Apparently spent some time at [facility] and from there was discharged to rehab.  Unfortunately there is minimal information available as to what was done during hospitalization.  Apparently he did not make much progress at rehab but ran out of time was discharged home.  Since then he has been following apparently up to 3 times a day.  The patient himself does not note a consistent pattern in his falls.  Sometimes he feels lightheaded.  Sometimes he feels like he just loses his balance for unknown reason.  He is supposed be using a walker which is daughter states she was sometimes find on the floor good 15+ feet away from his walker.  She has concerns about some cognitive issues also.  She does note that he has an unsteady gait in general.  She notes she has increasing difficulty with fine motor skills.       1.Frequent  falls/orthostasis/recurrent syncope, currently blood pressure is soft to low therefore Coreg, Lasix, Imdur were held/discontinued per cardiology recommendations.. Echo revealed ejection fraction of 49% with grade 2 diastolic dysfunction.  Follow-up with cardiology closely as an outpatient basis and monitor blood pressure at home as well as fluid status.  Advised patient to check weight on a daily basis and if he gains 3 pounds or more notify cardiology immediately.     2.  Neuropathy, extensive serologic workup has been initiated by neurology and tested negative for HIV and hepatitis.  TSH level, B12 and folate levels are normal.  CT findings revealed left frontal encephalomalacia therefore underwent EEG which was normal..  Neurology ordered MRI of the spine but unable to obtain secondary to cranial plate from prior surgery.  RPR was negative.  Cryoglobulin, Sjogren's antibody, DRISS/RA/C3/C4 are still pending.  Neurology to arrange EMG/NCS with Dr. Mayank Espinosa as an outpatient basis.     3.  Chronic heart failure, currently appears to be concentrated and secondary to soft low blood pressures Coreg and Lasix have been held/Discontinued upon discharge.     4.  Coronary artery disease, currently chest pain-free and is on aspirin, Effient, Ranexa will be continued.  Please note Coreg and aspirin were discontinued due to above-mentioned reasons.     5. Macrocytic anemia, again B12 and folate levels are normal.  No evidence of any active bleeding and will follow-up with hematology as an outpatient basis.     6.  BPH, on Flomax.     7. Acute kidney injury, likely due to hypoperfusion and monitor ins and outs closely.   Creatinine has improved from 1.39-->1.32-->1.42-->1.26.  Baseline creatinine appears to be 1.1 and does have underlying CKD stage II.  Nephrology did evaluate as well during this hospital stay.    Copied text on this note has been reviewed by me on 10/3/2024    Day of Discharge         Physical Exam:  Temp:   [97.4 °F (36.3 °C)-97.7 °F (36.5 °C)] 97.6 °F (36.4 °C)  Heart Rate:  [57-71] 57  Resp:  [15-18] 15  BP: ()/(42-65) 119/42  Body mass index is 27.83 kg/m².  Physical Exam  HEENT: PERRLA, extraocular movements intact, Scleras no icterus  Neck: Supple, no JVD  Cardiovascular: Regular rate and rhythm with normal S1 and S2  Respiratory: Fairly clear to auscultation bilaterally with no wheezes  GI: Soft, nontender, bowel sounds are present  Extremities: No edema, palpable pedal pulses  Neurologic: Grossly nonfocal, no facial asymmetry      Consultants     Consult Orders (all) (From admission, onward)       Start     Ordered    09/29/24 1550  Inpatient Nephrology Consult  Once        Specialty:  Nephrology  Provider:  Mirza Kinney MD    09/29/24 1549    09/29/24 1549  Inpatient Cardiology Consult  Once        Specialty:  Cardiology  Provider:  Kathrine Price MD    09/29/24 1549    09/28/24 1730  Inpatient Psychiatrist Consult  Once,   Status:  Canceled        Specialty:  Psychiatry  Provider:  (Not yet assigned)    09/28/24 1729    09/28/24 1532  Inpatient Neurology Consult General  Once        Specialty:  Neurology  Provider:  Cristiane Head MD    09/28/24 1531    09/28/24 1516  Inpatient Case Management  Consult  Once        Provider:  (Not yet assigned)    09/28/24 1516    09/28/24 1227  [facility] (on-call MD unless specified) Details  Once        Specialty:  Hospitalist  Provider:  (Not yet assigned)    09/28/24 1226                  Procedures     * Surgery not found *    Imaging Results (All)       Procedure Component Value Units Date/Time    CT Head Without Contrast [[id number]] Collected: 09/28/24 0725     Updated: 09/28/24 0737    Narrative:      CT SCAN OF THE BRAIN WITHOUT CONTRAST     HISTORY: Recent fall. Nausea and vomiting. Previous brain surgery many  years ago. Incomplete medical record available.     The CT scan was performed as an emergency procedure through the  brain  without contrast. There are no prior exams currently available for  comparison. There is moderate diffuse atrophy and chronic small vessel  ischemic change. There is a large left frontal craniectomy with adjacent  underlying encephalomalacia in the frontal lobe. There is some streak  artifact related to the metal at the craniectomy site. I see no definite  underlying acute hemorrhage and there is no mass effect.     There is mild mucosal thickening at the floor of both maxillary sinuses.  There is prominent fluid in the left mastoid air cells.                 Radiation dose reduction techniques were utilized, including automated  exposure control and exposure modulation based on body size.        This report was finalized on 9/28/2024 7:34 AM by Dr. Jose Miguel Dumont M.D  on Workstation: [id number]       XR Chest 1 View [[id number]] Collected: 09/28/24 0639     Updated: 09/28/24 0645    Narrative:      ONE-VIEW PORTABLE CHEST AT 6:40 A.M.     HISTORY: Fell. Chest pain.     FINDINGS: There are no prior exams for comparison. The lungs are  moderately expanded with numerous scattered calcified granulomas in both  lungs and there is some parenchymal and pleural change in the lower left  chest representing acute versus chronic change of atelectasis and  scarring and pleural thickening versus pleural effusion. There is mild  cardiomegaly with sternal wires and previous cardiac surgery and  associated with very slight vascular congestion. The history mentions  trauma and the visualized bony thorax is intact.     This report was finalized on 9/28/2024 6:42 AM by Dr. Jose Miguel Dumont M.D  on Workstation: [id number]               Results for orders placed during the hospital encounter of 09/28/24    Adult Transthoracic Echo Complete W/ Cont if Necessary Per Protocol    Interpretation Summary    Left ventricular systolic function is low normal. Calculated left ventricular EF = 49.3%.    The left ventricular cavity is mildly  dilated.    Left ventricular diastolic function is consistent with (grade II w/high LAP) pseudonormalization.    Mild-moderate mitral regurgitation.    Estimated right ventricular systolic pressure from tricuspid regurgitation is normal (<35 mmHg).    The left atrial cavity is moderately dilated.  Normal RA and IVC size.    Pertinent Labs     Results from last 7 days   Lab Units 10/03/24  0302 10/02/24  0313 10/01/24  0333 09/30/24  0317   WBC 10*3/mm3 5.14 4.78 4.87 5.16   HEMOGLOBIN g/dL 11.6* 12.0* 12.0* 11.1*   PLATELETS 10*3/mm3 119* 118* 116* 112*     Results from last 7 days   Lab Units 10/03/24  0302 10/02/24  0313 10/01/24  0333 09/30/24  0317   SODIUM mmol/L 135* 137 136 136   POTASSIUM mmol/L 4.2 4.2 4.5 4.2   CHLORIDE mmol/L 106 103 105 104   CO2 mmol/L 23.2 25.5 25.0 25.0   BUN mg/dL 23 19 18 19   CREATININE mg/dL 1.26 1.21 1.47* 1.32*   GLUCOSE mg/dL 84 85 84 81   EGFR mL/min/1.73 57.3* 60.2 47.6* 54.2*     Results from last 7 days   Lab Units 10/03/24  0302 10/02/24  0313 10/01/24  0333 09/28/24  0711   ALBUMIN g/dL 3.1* 3.3* 3.3* 3.5   BILIRUBIN mg/dL  --   --   --  0.6   ALK PHOS U/L  --   --   --  75   AST (SGOT) U/L  --   --   --  15   ALT (SGPT) U/L  --   --   --  11     Results from last 7 days   Lab Units 10/03/24  0302 10/02/24  0313 10/01/24  0333 09/30/24  0317 09/29/24  0446 09/28/24  0711   CALCIUM mg/dL 8.6 9.1 8.7 8.8 8.7 8.9   ALBUMIN g/dL 3.1* 3.3* 3.3*  --   --  3.5   MAGNESIUM mg/dL  --   --   --   --   --  2.3   PHOSPHORUS mg/dL 2.5 2.6 2.6  --  2.7  --      Results from last 7 days   Lab Units 09/28/24  0711   LIPASE U/L 30     Results from last 7 days   Lab Units 09/30/24  1243 09/29/24  0446 09/28/24  0926 09/28/24  0711   CK TOTAL U/L 43 46  --   --    HSTROP T ng/L  --   --  21 24*   PROBNP pg/mL  --   --   --  577.0     Results from last 7 days   Lab Units 10/01/24  0333 09/30/24  1200   SODIUM UR mmol/L  --  40   CREATININE UR mg/dL  --  36.2   CHLORIDE UR mmol/L  --  <60  "  PROTEIN TOTAL URINE mg/dL  --  5.7   URIC ACID mg/dL 5.1  --          Invalid input(s): \"LDLCALC\"          Test Results Pending at Discharge     Pending Results       Procedure [Order ID] Specimen - Date/Time    Cryoglobulin [[id number]] Collected: 09/29/24 1417    Specimen: Blood Updated: 09/29/24 1431              Discharge Details        Discharge Medications        Continue These Medications        Instructions Start Date   albuterol sulfate  (90 Base) MCG/ACT inhaler  Commonly known as: PROVENTIL HFA;VENTOLIN HFA;PROAIR HFA   No dose, route, or frequency recorded.      aspirin 81 MG EC tablet   81 mg, Oral, Daily, Last dose 11/27/17      atorvastatin 40 MG tablet  Commonly known as: LIPITOR   40 mg, Oral, Nightly      Cyanocobalamin 1000 MCG/ML kit   1,000 mcg, Injection, Weekly      Dexilant 60 MG capsule  Generic drug: dexlansoprazole   60 mg, Oral, Daily      folic acid 1 MG tablet  Commonly known as: FOLVITE   1 mg, Oral, Daily      melatonin 5 MG tablet tablet   5 mg, Oral, Nightly PRN      nitroglycerin 0.4 MG SL tablet  Commonly known as: NITROSTAT   PLACE 1 TAB UNDER THE TONGUE EVERY 5 MINUTES AS NEEDED FOR CHEST PAIN. NO MORE THAN 3 DOSES IN 15 MINUTES NEEDS APPOINTMENT.      polyethyl glycol-propyl glycol 0.4-0.3 % solution ophthalmic solution (artificial tears)  Commonly known as: SYSTANE   2 drops, Both Eyes, Every 1 Hour PRN      polyethylene glycol 236 g solution  Commonly known as: GoLYTELY   Starting at noon on day prior to procedure, drink 8 ounces every 30 minutes until all gone or stools are clear. May add flavor packet.      prasugrel 10 MG tablet  Commonly known as: EFFIENT   10 mg, Oral, Daily      ranolazine 1000 MG 12 hr tablet  Commonly known as: RANEXA   TAKE ONE TABLET BY MOUTH TWO TIMES A DAY      simethicone 80 MG chewable tablet  Commonly known as: Gas-X   80 mg, Oral, Every 6 Hours PRN      tamsulosin 0.4 MG capsule 24 hr capsule  Commonly known as: FLOMAX   1 capsule, " "Oral, Nightly      Testosterone Cypionate 200 MG/ML injection  Commonly known as: DEPOTESTOTERONE CYPIONATE   200 mg, Intramuscular, Every 14 Days             Stop These Medications      furosemide 40 MG tablet  Commonly known as: LASIX     isosorbide mononitrate 60 MG 24 hr tablet  Commonly known as: IMDUR     metoprolol tartrate 50 MG tablet  Commonly known as: LOPRESSOR              Allergies   Allergen Reactions    Norvasc [Amlodipine Besylate] Anaphylaxis    Ticagrelor Anaphylaxis    Coumadin [Warfarin Sodium] Other (See Comments)     Excessive bleeding    Lipitor [Atorvastatin] Other (See Comments)     \"affected movement of heart\"    Clopidogrel Rash     Other reaction(s): rash    Enalapril Rash    Plavix [Clopidogrel Bisulfate] Rash       Discharge Disposition:  Home or Self Care      Discharge Diet:  Diet Order   Procedures    Diet: Regular/House, Cardiac; Healthy Heart (2-3 Na+); Fluid Consistency: Thin (IDDSI 0)       Discharge Activity:   Activity Instructions       Activity as Tolerated              CODE STATUS:    Code Status and Medical Interventions: CPR (Attempt to Resuscitate); Full Support   Ordered at: 09/28/24 1450     Code Status (Patient has no pulse and is not breathing):    CPR (Attempt to Resuscitate)     Medical Interventions (Patient has pulse or is breathing):    Full Support       Future Appointments   Date Time Provider Department Center   11/4/2024  1:20 PM Abimbola Catherine APRN [facility]     Additional Instructions for the Follow-ups that You Need to Schedule       Discharge Follow-up with PCP   As directed       Currently Documented PCP:    Sebastian Larose MD    PCP Phone Number:    [phone]     Follow Up Details: 1 week        Discharge Follow-up with Specified Provider: ; 2 Weeks   As directed      To:    Follow Up: 2 Weeks        Discharge Follow-up with Specified Provider: [facility]; 2 Weeks   As directed      To: " [facility]   Follow Up: 2 Weeks               Follow-up Information       Sebastian Larose MD .    Specialty: Family Medicine  Why: 1 week  Contact information:  [address]  [place] [address]  [phone]                             Additional Instructions for the Follow-ups that You Need to Schedule       Discharge Follow-up with PCP   As directed       Currently Documented PCP:    Sebastian Larose MD    PCP Phone Number:    [phone]     Follow Up Details: 1 week        Discharge Follow-up with Specified Provider: ; 2 Weeks   As directed      To:    Follow Up: 2 Weeks        Discharge Follow-up with Specified Provider: [facility]; 2 Weeks   As directed      To: [facility]   Follow Up: 2 Weeks            Time Spent on Discharge:  Greater than 30 minutes      August Griffin MD  [facility]  10/03/24  11:38 EDT

## 2024-10-03 NOTE — PLAN OF CARE
Goal Outcome Evaluation:   Goals met.  Discharge home with daughter.

## 2024-10-03 NOTE — THERAPY TREATMENT NOTE
Patient Name: Dale Mann  : 1940    MRN: [id number]                              Today's Date: 10/3/2024       Admit Date: 2024    Visit Dx:     ICD-10-CM ICD-9-CM   1. Frequent falls  R29.6 V15.88   2. Unstable gait  R26.81 781.2   3. Elevated troponin  R79.89 790.6     Patient Active Problem List   Diagnosis    Macrocytic anemia    Old myocardial infarction    Hyperkalemia    History of peptic ulcer    Hematochezia    Fever    Essential hypertension    Dysuria    Diverticulosis of sigmoid colon    CAD (coronary artery disease), native coronary artery    Chronic gastritis    Chronic anemia    Epigastric pain    Encounter for colonoscopy due to history of adenomatous colonic polyps    Dysphagia    Gastroesophageal reflux disease    Acute on chronic systolic CHF (congestive heart failure), NYHA class 2    CANDY (acute kidney injury)    Bradycardia    History of coronary artery bypass graft    Hyperlipidemia    Hearing loss    Precordial pain    Frequent falls    Orthostatic hypotension    CHF (congestive heart failure)    CANDY (acute kidney injury)    Neuropathy     Past Medical History:   Diagnosis Date    Acid reflux     Arthritis     Chronic anemia     Chronic gastritis     Coronary arteriosclerosis     hx of CABG then stent placement,  is followed by dr pickard in [place]    Coronary artery disease of bypass graft of native heart with stable angina pectoris 2019    Diverticulosis of sigmoid colon     Dysuria     Essential hypertension     Fever     Hematochezia     High cholesterol     History of peptic ulcer     [place] (hard of hearing)     wearing heading aids david    Hyperkalemia     Macular degeneration     left eye only    Old myocardial infarction     Other specified anemias     Screening for malignant neoplasm of prostate     Upper respiratory infection     Urinary tract infectious disease      Past Surgical History:   Procedure Laterality Date    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 A single polyp was found in the sigmoid colon. Removed by cold biopsy polypectomy. Internal and external hemorrhoids found.    COLONOSCOPY N/A 05/16/2018    Procedure: COLONOSCOPY;  Surgeon: Ricardo Chamberlain MD;  Location: [facility] ENDOSCOPY;  Service: Gastroenterology    CORONARY ANGIOPLASTY  2007    CORONARY ARTERY BYPASS GRAFT      CORONARY STENT PLACEMENT Left     ENDOSCOPY N/A 05/16/2018    Procedure: ESOPHAGOGASTRODUODENOSCOPY;  Surgeon: Ricardo Chamberlain MD;  Location: [facility] ENDOSCOPY;  Service: Gastroenterology    INGUINAL HERNIA REPAIR Left 12/08/2017    Procedure: OPEN LEFT INGUINAL HERNIA REPAIR WITH MESH;  Surgeon: Dale Diehl MD;  Location: [facility] OR;  Service:     PHALLOPLASTY, CIRCUMCISION N/A 05/24/2017    Procedure: CIRCUMCISION;  Surgeon: Dale Lezama MD;  Location: [facility] OR;  Service:     UPPER GASTROINTESTINAL ENDOSCOPY  01/14/2015    Normal esophagus. Gastritis was found in the stomach. Biopsy taken. Duodenitis was found in the duodenum. Biopsy taken.      General Information       [facility] Name 10/03/24 [facility]3          Physical Therapy Time and Intention    Document Type therapy note (daily note)  -     Mode of Treatment individual therapy;physical therapy  -[facility] Name 10/03/24 1323          General Information    Patient Profile Reviewed yes  -     Existing Precautions/Restrictions fall  c/o double vision, but improving  -[facility] Name 10/03/24 1323          Living Environment    People in Home child(hugo), adult  -[facility] Name 10/03/24 1323          Cognition    Orientation Status (Cognition) oriented x 3  -[facility] Name 10/03/24 1323          Safety Issues, Functional Mobility    Safety Issues Affecting Function (Mobility) problem-solving;judgment;safety precaution  awareness  -     Impairments Affecting Function (Mobility) balance;strength  -               User Key  (r) = Recorded By, (t) = Taken By, (c) = Cosigned By      Initials Name Provider Type    Rani Lee PTA Physical Therapist Assistant                   Mobility       Row Name 10/03/24 1324          Bed Mobility    Supine-Sit [place] (Bed Mobility) contact guard;standby assist;verbal cues  -       Row Name 10/03/24 1324          Bed-Chair Transfer    Bed-Chair [place] (Transfers) contact guard  -     Assistive Device (Bed-Chair Transfers) walker, front-wheeled  -JM       Row Name 10/03/24 1324          Sit-Stand Transfer    Sit-Stand [place] (Transfers) contact guard  -     Assistive Device (Sit-Stand Transfers) walker, front-wheeled  -     Comment, (Sit-Stand Transfer) slight post lean  -       Row Name 10/03/24 1324          Gait/Stairs (Locomotion)    [place] Level (Gait) contact guard  -     Assistive Device (Gait) walker, front-wheeled  -     Distance in Feet (Gait) 150  -               User Key  (r) = Recorded By, (t) = Taken By, (c) = Cosigned By      Initials Name Provider Type    Rani Lee PTA Physical Therapist Assistant                   Obj/Interventions       Row Name 10/03/24 1324          Motor Skills    Therapeutic Exercise --  LAQs , seated MIP x10 reps  -               User Key  (r) = Recorded By, (t) = Taken By, (c) = Cosigned By      Initials Name Provider Type    Rani Lee PTA Physical Therapist Assistant                   Goals/Plan    No documentation.                  Clinical Impression       Row Name 10/03/24 1325          Pain    Pretreatment Pain Rating 0/10 - no pain  -       Row Name 10/03/24 1325          Plan of Care Review    Plan of Care Reviewed With patient  -ENIO     Progress improving  -     Outcome Evaluation Pt agreed to PT session, pt iman bed mob CGA/SBA 1, slight post lean, pt iman STS assist of 1, pt  iman amb ~150ft cg/SBA rwx, pt reports no concerns, a few steps to enter , but has rail, dtr to assist , DC possibly today  -       Row Name 10/03/24 1325          Therapy Assessment/Plan (PT)    Rehab Potential (PT) good, to achieve stated therapy goals  -       Row Name 10/03/24 1325          Vital Signs    O2 Delivery Pre Treatment room air  -       Row Name 10/03/24 1325          Positioning and Restraints    Pre-Treatment Position in bed  -     Post Treatment Position chair  -     In Chair reclined;call light within reach;encouraged to call for assist;exit alarm on;notified nsg  -               User Key  (r) = Recorded By, (t) = Taken By, (c) = Cosigned By      Initials Name Provider Type    Rani Lee PTA Physical Therapist Assistant                   Outcome Measures       Row Name 10/03/24 1328          How much help from another person do you currently need...    Turning from your back to your side while in flat bed without using bedrails? 4  -JM     Moving from lying on back to sitting on the side of a flat bed without bedrails? 3  -JM     Moving to and from a bed to a chair (including a wheelchair)? 3  -JM     Standing up from a chair using your arms (e.g., wheelchair, bedside chair)? 3  -JM     Climbing 3-5 steps with a railing? 3  -JM     To walk in hospital room? 3  -JM     AM-PAC 6 Clicks Score (PT) 19  -     Highest Level of Mobility Goal 6 --> Walk 10 steps or more  -               User Key  (r) = Recorded By, (t) = Taken By, (c) = Cosigned By      Initials Name Provider Type    Rani Lee PTA Physical Therapist Assistant                                 Physical Therapy Education       Title: PT OT SLP Therapies (In Progress)       Topic: Physical Therapy (Done)       Point: Mobility training (Done)       Learning Progress Summary             Patient Eager, E,EUSEBIO,TOM, JHOANA,NR by ENIO at 10/3/2024 1328    Acceptance, WILMAN,TOM KAPLAN, JHOANA,NR by BRANDON at 10/2/2024 1648    Acceptance,  E,TB,D, VU,NR by  at 10/1/2024 1059    Acceptance, E,TB, VU by MS at 9/30/2024 1138                         Point: Home exercise program (Done)       Learning Progress Summary             Patient Eager, E,TB,D, VU,NR by  at 10/3/2024 1328    Acceptance, E,TB,D, VU,NR by  at 10/2/2024 1648    Acceptance, E,TB,D, VU,NR by  at 10/1/2024 1059    Acceptance, E,TB, VU by MS at 9/30/2024 1138                         Point: Body mechanics (Done)       Learning Progress Summary             Patient Eager, E,TB,D, VU,NR by  at 10/3/2024 1328    Acceptance, E,TB,D, VU,NR by  at 10/2/2024 1648    Acceptance, E,TB,D, VU,NR by  at 10/1/2024 1059    Acceptance, E,TB, VU by MS at 9/30/2024 1138                         Point: Precautions (Done)       Learning Progress Summary             Patient Eager, E,TB,D, VU,NR by  at 10/3/2024 1328    Acceptance, E,TB,D, VU,NR by  at 10/2/2024 1648    Acceptance, E,TB,D, VU,NR by  at 10/1/2024 1059    Acceptance, E,TB, VU by MS at 9/30/2024 1138                                         User Key       Initials Effective Dates Name Provider Type Discipline     03/07/18 -  Rani Marshall PTA Physical Therapist Assistant PT     03/07/18 -  Isidra Marshall, PTA Physical Therapist Assistant PT    MS 06/16/21 -  Ashlee Dealney PT Physical Therapist PT     06/16/21 -  Joanne Diez PTA Physical Therapist Assistant PT                  PT Recommendation and Plan     Plan of Care Reviewed With: patient  Progress: improving  Outcome Evaluation: Pt agreed to PT session, pt iman bed mob CGA/SBA 1, slight post lean, pt iman STS assist of 1, pt iman amb ~150ft cg/SBA rwx, pt reports no concerns, a few steps to enter , but has rail, dtr to assist , DC possibly today     Time Calculation:         PT Charges       Row Name 10/03/24 1329             Time Calculation    Start Time 0930  -ENIO      Stop Time 0955  -ENIO      Time Calculation (min) 25 min  -ENIO      PT Received  On 10/03/24  -ENIO      PT - Next Appointment 10/04/24  -ENIO                User Key  (r) = Recorded By, (t) = Taken By, (c) = Cosigned By      Initials Name Provider Type    Rani Lee PTA Physical Therapist Assistant                  Therapy Charges for Today       Code Description Service Date Service Provider Modifiers Qty    70906195414 HC PT THERAPEUTIC ACT EA 15 MIN 10/3/2024 Rani Masrhall PTA GP 1    56318849635 HC PT THER PROC EA 15 MIN 10/3/2024 Rani Marshall PTA GP 1            PT G-Codes  Outcome Measure Options: AM-PAC 6 Clicks Basic Mobility (PT)  AM-PAC 6 Clicks Score (PT): 19  AM-PAC 6 Clicks Score (OT): 16  PT Discharge Summary  Anticipated Discharge Disposition (PT): home with assist, home with home health    Rani Marshall PTA  10/3/2024

## 2024-10-03 NOTE — PROGRESS NOTES
"   LOS: 4 days     Chief Complaint/ Reason for encounter: CKD management    Subjective   10/01/24 : Patient is doing well today with no new complaints  Good appetite with no nausea or vomiting  No shortness of breath chest pain or edema  Voiding well with no dysuria    10/3: He looks and feels well denies complaints  No shortness of breath chest pain fevers chills or edema  Good appetite with no nausea  Discharge planned      Medical history reviewed:  History of Present Illness    Subjective    History taken from: Patient and chart    Vital Signs  Temp:  [97.4 °F (36.3 °C)-97.9 °F (36.6 °C)] 97.9 °F (36.6 °C)  Heart Rate:  [57-71] 60  Resp:  [15-17] 17  BP: ()/(42-58) 115/53       Wt Readings from Last 1 Encounters:   09/30/24 0912 83 kg (183 lb)   09/28/24 0734 83 kg (183 lb)       Objective:  Vital signs: (most recent): Blood pressure 115/53, pulse 60, temperature 97.9 °F (36.6 °C), temperature source Oral, resp. rate 17, height 172.7 cm (68\"), weight 83 kg (183 lb), SpO2 100%.                Objective:  General Appearance:  Comfortable, well-appearing, in no acute distress and not in pain.  Awake, alert  HEENT: Mucous membranes moist, no injury, oropharynx clear  Lungs:  Normal effort and normal respiratory rate.  Breath sounds clear to auscultation.  No  respiratory distress.  No rales, decreased breath sounds or rhonchi.    Heart: Normal rate.  Regular rhythm.  S1, S2 normal.  No murmur.   Abdomen: Abdomen is soft.  Bowel sounds are normal, no abdominal tenderness.  There is no rebound or guarding  Extremities: Trace edema of bilateral lower extremities  Skin:  Warm and dry with no rashes      Results Review:    Intake/Output:     Intake/Output Summary (Last 24 hours) at 10/3/2024 1524  Last data filed at 10/3/2024 1410  Gross per 24 hour   Intake 220 ml   Output 1600 ml   Net -1380 ml         DATA:  Radiology and Labs:  The following labs independently reviewed by me. Additional labs ordered for " tomorrow a.m.  Interval notes, chart personally reviewed by me.   Old records independently reviewed showing history of CHF, baseline creatinine around 1.1  The following radiologic studies independently viewed by me, findings echo with EF 49%  New problems include candy  Discussed with patient himself at bedside    Risk/ complexity of medical care/ medical decision making moderate complexity, CKD, CANDY CHF management    Labs:   Recent Results (from the past 24 hour(s))   Renal Function Panel    Collection Time: 10/03/24  3:02 AM    Specimen: Blood   Result Value Ref Range    Glucose 84 65 - 99 mg/dL    BUN 23 8 - 23 mg/dL    Creatinine 1.26 0.76 - 1.27 mg/dL    Sodium 135 (L) 136 - 145 mmol/L    Potassium 4.2 3.5 - 5.2 mmol/L    Chloride 106 98 - 107 mmol/L    CO2 23.2 22.0 - 29.0 mmol/L    Calcium 8.6 8.6 - 10.5 mg/dL    Albumin 3.1 (L) 3.5 - 5.2 g/dL    Phosphorus 2.5 2.5 - 4.5 mg/dL    Anion Gap 5.8 5.0 - 15.0 mmol/L    BUN/Creatinine Ratio 18.3 7.0 - 25.0    eGFR 57.3 (L) >60.0 mL/min/1.73   CBC Auto Differential    Collection Time: 10/03/24  3:02 AM    Specimen: Blood   Result Value Ref Range    WBC 5.14 3.40 - 10.80 10*3/mm3    RBC 3.40 (L) 4.14 - 5.80 10*6/mm3    Hemoglobin 11.6 (L) 13.0 - 17.7 g/dL    Hematocrit 34.8 (L) 37.5 - 51.0 %    .4 (H) 79.0 - 97.0 fL    MCH 34.1 (H) 26.6 - 33.0 pg    MCHC 33.3 31.5 - 35.7 g/dL    RDW 14.9 12.3 - 15.4 %    RDW-SD 55.5 (H) 37.0 - 54.0 fl    MPV 9.3 6.0 - 12.0 fL    Platelets 119 (L) 140 - 450 10*3/mm3    Neutrophil % 56.0 42.7 - 76.0 %    Lymphocyte % 30.0 19.6 - 45.3 %    Monocyte % 10.7 5.0 - 12.0 %    Eosinophil % 1.9 0.3 - 6.2 %    Basophil % 0.4 0.0 - 1.5 %    Immature Grans % 1.0 (H) 0.0 - 0.5 %    Neutrophils, Absolute 2.88 1.70 - 7.00 10*3/mm3    Lymphocytes, Absolute 1.54 0.70 - 3.10 10*3/mm3    Monocytes, Absolute 0.55 0.10 - 0.90 10*3/mm3    Eosinophils, Absolute 0.10 0.00 - 0.40 10*3/mm3    Basophils, Absolute 0.02 0.00 - 0.20 10*3/mm3    Immature  Grans, Absolute 0.05 0.00 - 0.05 10*3/mm3    nRBC 0.0 0.0 - 0.2 /100 WBC       Radiology:  Pertinent radiology studies were reviewed as described above      Medications have been reviewed separately in chart overview      ASSESSMENT:  CANDY, slightly worse again today, euvolemic on exam, suspect due to low perfusion related to hypotension  Chronic diastolic CHF, EF low normal on echo  Anemia  CKD stage II, baseline creatinine around 1.1  Proteinuria on UA but concentrated specimen    Frequent falls        DISCUSSION/PLAN:   Creatinine further improved today and near baseline  Remains euvolemic on exam  Cardiology recommendations noted, plans to monitor blood pressure off carvedilol and Imdur  He remains euvolemic off the Lasix  Agree with plans to monitor off diuresis at this time    Stable for discharge from renal standpoint later today      Mirza Kinney MD  [facility]   Office phone number: [phone]  Answering service phone number: [phone]    10/03/24  15:24 EDT    Dictation performed using Dragon dictation software

## 2024-10-04 ENCOUNTER — READMISSION MANAGEMENT (OUTPATIENT)
Dept: CALL CENTER | Facility: HOSPITAL | Age: 84
End: 2024-10-04
Payer: MEDICARE

## 2024-10-04 LAB
BACTERIA ISLT: NORMAL
CRYOGLOB SER QL 1D COLD INC: NORMAL

## 2024-10-04 NOTE — OUTREACH NOTE
Prep Survey      Flowsheet Row Responses   [facility] facility patient discharged from? [place]   Is LACE score < 7 ? No   Eligibility Readm Mgmt   Discharge diagnosis Frequent falls/CHF   Does the patient have one of the following disease processes/diagnoses(primary or secondary)? CHF   Does the patient have Home health ordered? Yes   What is the Home health agency?  [facility] pending at discharge   Is there a DME ordered? No   Prep survey completed? Yes            JUAN LEAL - Registered Nurse

## 2024-10-04 NOTE — OUTREACH NOTE
CHF Week 1 Survey      Flowsheet Row Responses   [facility] facility patient discharged from? [place]   Does the patient have one of the following disease processes/diagnoses(primary or secondary)? CHF   CHF Week 1 attempt successful? No   Unsuccessful attempts Attempt 1            Isi Resendiz Registered Nurse

## 2024-10-07 ENCOUNTER — READMISSION MANAGEMENT (OUTPATIENT)
Dept: CALL CENTER | Facility: HOSPITAL | Age: 84
End: 2024-10-07
Payer: MEDICARE

## 2024-10-07 NOTE — OUTREACH NOTE
CHF Week 1 Survey      Flowsheet Row Responses   [facility] facility patient discharged from? [place]   Does the patient have one of the following disease processes/diagnoses(primary or secondary)? CHF   CHF Week 1 attempt successful? No   Unsuccessful attempts Attempt 2            Radha RÍOS - Registered Nurse

## 2024-10-08 ENCOUNTER — READMISSION MANAGEMENT (OUTPATIENT)
Dept: CALL CENTER | Facility: HOSPITAL | Age: 84
End: 2024-10-08
Payer: MEDICARE

## 2024-10-08 NOTE — CASE MANAGEMENT/SOCIAL WORK
Case Management Discharge Note      Final Note: This CCP nurse spoke with daughter, Satinder Appiah, who stated that she has been in contact with [facility] and they will be following her father for care. She will also be establishing care with [facility] house calls so that her father will be receiving care in the home with a visiting ARNP vs going to the MD.    Provided Post Acute Provider List?: N/A  Provided Post Acute Provider Quality & Resource List?: N/A    Selected Continued Care - Discharged on 10/3/2024 Admission date: 9/28/2024 - Discharge disposition: Home or Self Care      Destination    No services have been selected for the patient.                Durable Medical Equipment    No services have been selected for the patient.                Dialysis/Infusion    No services have been selected for the patient.                Home Medical Care    No services have been selected for the patient.                Therapy    No services have been selected for the patient.                Community Resources    No services have been selected for the patient.                Community & DME    No services have been selected for the patient.                    Transportation Services  Private: Car    Final Discharge Disposition Code: 01 - home or self-care

## 2024-10-08 NOTE — OUTREACH NOTE
CHF Week 1 Survey      Flowsheet Row Responses   [facility] patient discharged from? [place]   Does the patient have one of the following disease processes/diagnoses(primary or secondary)? CHF   CHF Week 1 attempt successful? Yes   Call start time 1047   Call end time 1055   Discharge diagnosis Frequent falls/CHF   Meds reviewed with patient/caregiver? Yes   Is the patient having any side effects they believe may be caused by any medication additions or changes? No   Does the patient have all medications ordered at discharge? N/A   Is the patient taking all medications as directed (includes completed medication regime)? Yes   What is the Home health agency?  [facility] pending at discharge   Has home health visited the patient within 72 hours of discharge? Call prior to 72 hours   DME comments Daughter cant find but states O2 sats is laways good   Psychosocial issues? No   Did the patient receive a copy of their discharge instructions? Yes   Nursing interventions Reviewed instructions with patient   What is the patient's perception of their health status since discharge? Improving   Is the patient able to teach back signs and symptoms of worsening condition? (i.e. weight gain, shortness of air, etc.) Yes   Is the patient/caregiver able to teach back the hierarchy of who to call/visit for symptoms/problems? PCP, Specialist, Home health nurse, Urgent Care, ED, 911 Yes    CHF Week 1 call completed? Yes   Wrap up additional comments Daughter is having to encourage Pt to drink, reports urine is still dark. Pt has appt with PCP on 10/10/24. Daughter is aware on when to seek medical treatment. Call transferred to HF clinic for appt.   Call end time 1055            BETH SHARIF - Registered Nurse

## 2024-10-17 ENCOUNTER — READMISSION MANAGEMENT (OUTPATIENT)
Dept: CALL CENTER | Facility: HOSPITAL | Age: 84
End: 2024-10-17
Payer: MEDICARE

## 2024-10-17 NOTE — OUTREACH NOTE
CHF Week 2 Survey      Flowsheet Row Responses   [facility] patient discharged from? [place]   Does the patient have one of the following disease processes/diagnoses(primary or secondary)? CHF   Week 2 attempt successful? No   Unsuccessful attempts Attempt 1            DARSHANA SANDERS - Registered Nurse

## 2024-10-21 ENCOUNTER — READMISSION MANAGEMENT (OUTPATIENT)
Dept: CALL CENTER | Facility: HOSPITAL | Age: 84
End: 2024-10-21
Payer: MEDICARE

## 2024-10-21 NOTE — OUTREACH NOTE
CHF Week 2 Survey      Flowsheet Row Responses   [facility] patient discharged from? [place]   Does the patient have one of the following disease processes/diagnoses(primary or secondary)? CHF   Week 2 attempt successful? Yes   Call start time 1910   Revoke Change in health status-moved to LTC/SNF/Hospice   Discharge diagnosis Frequent falls/CHF   Meds reviewed with patient/caregiver? Yes   Is the patient having any side effects they believe may be caused by any medication additions or changes? No   Does the patient have all medications ordered at discharge? Yes   Is the patient taking all medications as directed (includes completed medication regime)? Yes            MARICHUY HOLLIDAY - Registered Nurse

## 2024-10-31 ENCOUNTER — OFFICE VISIT (OUTPATIENT)
Dept: NEUROLOGY | Facility: CLINIC | Age: 84
End: 2024-10-31
Payer: MEDICARE

## 2024-10-31 ENCOUNTER — LAB (OUTPATIENT)
Dept: LAB | Facility: HOSPITAL | Age: 84
End: 2024-10-31
Payer: MEDICARE

## 2024-10-31 VITALS
SYSTOLIC BLOOD PRESSURE: 132 MMHG | OXYGEN SATURATION: 99 % | BODY MASS INDEX: 27.83 KG/M2 | HEART RATE: 52 BPM | DIASTOLIC BLOOD PRESSURE: 60 MMHG | WEIGHT: 183 LBS

## 2024-10-31 DIAGNOSIS — M79.601 PARESTHESIA AND PAIN OF BOTH UPPER EXTREMITIES: Primary | ICD-10-CM

## 2024-10-31 DIAGNOSIS — G60.9 IDIOPATHIC PERIPHERAL NEUROPATHY: ICD-10-CM

## 2024-10-31 DIAGNOSIS — R41.3 MEMORY DEFICIT: ICD-10-CM

## 2024-10-31 DIAGNOSIS — M54.2 CHRONIC NECK AND BACK PAIN: ICD-10-CM

## 2024-10-31 DIAGNOSIS — M54.9 CHRONIC NECK AND BACK PAIN: ICD-10-CM

## 2024-10-31 DIAGNOSIS — R20.2 PARESTHESIA AND PAIN OF BOTH UPPER EXTREMITIES: Primary | ICD-10-CM

## 2024-10-31 DIAGNOSIS — R42 DIZZINESS: ICD-10-CM

## 2024-10-31 DIAGNOSIS — G89.29 CHRONIC NECK AND BACK PAIN: ICD-10-CM

## 2024-10-31 DIAGNOSIS — R29.6 FREQUENT FALLS: ICD-10-CM

## 2024-10-31 DIAGNOSIS — R20.2 PARESTHESIA OF BOTH LOWER EXTREMITIES: ICD-10-CM

## 2024-10-31 DIAGNOSIS — M79.602 PARESTHESIA AND PAIN OF BOTH UPPER EXTREMITIES: Primary | ICD-10-CM

## 2024-10-31 PROCEDURE — 82525 ASSAY OF COPPER: CPT

## 2024-10-31 PROCEDURE — 84207 ASSAY OF VITAMIN B-6: CPT

## 2024-10-31 PROCEDURE — 84165 PROTEIN E-PHORESIS SERUM: CPT

## 2024-10-31 PROCEDURE — 86334 IMMUNOFIX E-PHORESIS SERUM: CPT

## 2024-10-31 PROCEDURE — 83516 IMMUNOASSAY NONANTIBODY: CPT

## 2024-10-31 PROCEDURE — 82784 ASSAY IGA/IGD/IGG/IGM EACH: CPT

## 2024-10-31 PROCEDURE — 84252 ASSAY OF VITAMIN B-2: CPT

## 2024-10-31 PROCEDURE — 82175 ASSAY OF ARSENIC: CPT

## 2024-10-31 PROCEDURE — 84425 ASSAY OF VITAMIN B-1: CPT

## 2024-10-31 PROCEDURE — 84155 ASSAY OF PROTEIN SERUM: CPT

## 2024-10-31 PROCEDURE — 86255 FLUORESCENT ANTIBODY SCREEN: CPT

## 2024-10-31 PROCEDURE — 36415 COLL VENOUS BLD VENIPUNCTURE: CPT

## 2024-10-31 PROCEDURE — 83655 ASSAY OF LEAD: CPT

## 2024-10-31 PROCEDURE — 83825 ASSAY OF MERCURY: CPT

## 2024-10-31 NOTE — PROGRESS NOTES
NAME: Dale Mann   : 1940    Chief Complaint   Patient presents with    Peripheral Neuropathy        HPI:  Dale Mann is a 84 y.o.  RIGHT-handed male who I am seeing for the first time following hospitalization for possible peripheral neuropathy?  He was last seen by Dr. Head and followed by Sammie Sam NP and Isis Ramirez NP, with the following history taken from that note with additions/modifications as indicated:    Patient presented to [facility] ER on 2024 for unwitnessed fall and dizziness.  He reported nausea and vomiting the morning of admission.  His daughter reported recurrent falls over the last several months.  He reported prior to feeling lightheaded and his legs which is give out on him.  He reports sometimes preceded by dizziness but other times it is not.  He reports occasional loss of consciousness with spells.  He also reports losing his balance for unknown reasons.  Patient was previously told he had orthostatic hypotension which was contributing.  He was also complaining of chronic neck pain.  MRI imaging of the brain and spine were not completed due to metal present in the body.  He reportedly he had an admission at [facility] early September and was sent to acute rehab afterwards for a short stay.  His daughter then transitioned him to an assisted living facility.    Workup to date:  CT head showed moderate diffuse atrophy and chronic small vessel disease with large left frontal craniotomy with underlying encephalomalacia in the left hilar lobe but no acute findings.   EEG: Normal in awake and sleep states, no potential seizure activity or focal slowing.  Labs: HIV 1 and 2 antibodies negative/nonreactive, CK level 53.  TSH 2.9, CK level 46, B12 417, folate 15.4, hemoglobin A1c 4.6, H&H 12.6/36.8, rheumatoid factor 10.4.  Cryoglobulin not detected.  DRISS and RA negative.  RPR nonreactive.  Sjogren's antibodies negative.  And PCP started patient on IM B12  supplementation.      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  He denies any history of diabetes or thyroid disease.  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  He denies any history of meningitis, spinal trauma, brain tumor or aneurysm of the brain artery.  Denies any family history of stroke, seizures, brain tumor or aneurysm of brain artery.  He denies any previous workup for neuropathy specifically no prior EMG.  He used to drink alcohol socially but has not drank in quite some time.  He was a former smoker and quit 1987.    Since patient has been home from hospital, he has transitioned nicely into assisted living.  He continues to do work with PT, OT and speech.  He denies any falls and ambulates with a walker.  He states his last fall was prior to hospitalization.  He continues to report dizziness at times that is mostly positional but can also occur randomly.  His viral reports improvement in dizziness with blood pressure medication adjustments.  He reports tinnitus at times.  He has bilateral hearing loss and wears hearing aids.    He reports numbness, tingling, pins-and-needles and occasional cramping and shooting shocklike pains in his hands and feet.  He reports weakness in his legs as well as in his hands.  He describes difficulty opening things and frequently dropping things.  He describes less finger dexterity.  He reports mild neck and back pain but denies any radicular pain down his arms or legs.  He denies any incontinence of bowel or bladder but has some dribbling making it to the bathroom.  Review of chart, patient had CT cervical spine at [facility] on 9/9/2024, per the  report moderate to severe degenerative changes throughout most pronounced at the atlantodental joint and C4-C5 level.  I am unable to you review the films of the cervical and lumbar CT.  Patient also had CT lumbar spine on 9/9/2024, per the report multiple old right transverse process fractures and multilevel degenerative changes of the lumbar spine producing multi level mild to moderate spinal canal stenosis.    Patient and daughter both bring up concerns with memory that have gotten worse over the last year.  Prior to hospitalization he was living at home with his girlfriend and had difficulty with some basic ADLs.  He is easily distracted and if there are any interruptions he forgets what he is doing and is unable to pick back up where he was.  He reports difficulty recalling names and conversations as well as some word finding difficulties.  He reports delayed processing of information.  He denies any wandering.  He is no longer driving.  He states that he sleeps pretty good but wakes up 2-3 times in the night to go to the bathroom.          Past Medical History:   Diagnosis Date    Acid reflux     Arthritis     Chronic anemia     Chronic gastritis     Coronary arteriosclerosis     hx of CABG then stent placement,  is followed by dr pickard in [place]    Coronary artery disease of bypass graft of native heart with stable angina pectoris 4/11/2019    Diverticulosis of sigmoid colon     Dysuria     Essential hypertension     Fever     Hematochezia     High cholesterol     History of peptic ulcer     [place] (hard of hearing)     wearing heading aids david    Hyperkalemia     Macular degeneration     left eye only    Old myocardial infarction     Other specified anemias     Screening for malignant neoplasm of prostate     Upper respiratory infection     Urinary tract infectious disease          Past Surgical History:   Procedure Laterality Date    BRAIN SURGERY  1967    secondary to mining accident    CATARACT  EXTRACTION Right     CATARACT EXTRACTION Left     CHOLECYSTECTOMY      COLONOSCOPY  01/14/2015    A diverticulum was found in the sigmoid colon and descending colon. A single polyp was found in the sigmoid colon. Removed by cold biopsy polypectomy. Internal and external hemorrhoids found.    COLONOSCOPY N/A 05/16/2018    Procedure: COLONOSCOPY;  Surgeon: Ricardo Chamberlain MD;  Location: [facility] ENDOSCOPY;  Service: Gastroenterology    CORONARY ANGIOPLASTY  2007    CORONARY ARTERY BYPASS GRAFT      CORONARY STENT PLACEMENT Left     ENDOSCOPY N/A 05/16/2018    Procedure: ESOPHAGOGASTRODUODENOSCOPY;  Surgeon: Ricardo Chamberlain MD;  Location: [facility] ENDOSCOPY;  Service: Gastroenterology    INGUINAL HERNIA REPAIR Left 12/08/2017    Procedure: OPEN LEFT INGUINAL HERNIA REPAIR WITH MESH;  Surgeon: Dale Diehl MD;  Location: [facility] OR;  Service:     PHALLOPLASTY, CIRCUMCISION N/A 05/24/2017    Procedure: CIRCUMCISION;  Surgeon: Dale Lezama MD;  Location: [facility] OR;  Service:     UPPER GASTROINTESTINAL ENDOSCOPY  01/14/2015    Normal esophagus. Gastritis was found in the stomach. Biopsy taken. Duodenitis was found in the duodenum. Biopsy taken.           Current Outpatient Medications:     albuterol sulfate  (90 Base) MCG/ACT inhaler, , Disp: , Rfl:     aspirin 81 MG EC tablet, Take 1 tablet by mouth Daily. Last dose 11/27/17, Disp: , Rfl:     atorvastatin (LIPITOR) 40 MG tablet, Take 1 tablet by mouth Every Night., Disp: 90 tablet, Rfl: 3    Cyanocobalamin 1000 MCG/ML kit, Inject 1 mL as directed 1 (One) Time Per Week., Disp: , Rfl:     Dexilant 60 MG capsule, Take 1 capsule by mouth Daily., Disp: 90 capsule, Rfl: 1    folic acid (FOLVITE) 1 MG tablet, Take 1 tablet by mouth Daily., Disp: , Rfl:     melatonin 5 MG tablet tablet, Take 1 tablet by mouth At Night As Needed., Disp: , Rfl:     nitroglycerin (NITROSTAT) 0.4 MG SL tablet, PLACE 1 TAB UNDER THE TONGUE EVERY 5 MINUTES AS NEEDED FOR CHEST PAIN. NO  "MORE THAN 3 DOSES IN 15 MINUTES NEEDS APPOINTMENT., Disp: 25 tablet, Rfl: 0    polyethyl glycol-propyl glycol (SYSTANE) 0.4-0.3 % solution ophthalmic solution, Administer 2 drops to both eyes Every 1 (One) Hour As Needed., Disp: , Rfl:     polyethylene glycol (GoLYTELY) 236 g solution, Starting at noon on day prior to procedure, drink 8 ounces every 30 minutes until all gone or stools are clear. May add flavor packet., Disp: 4000 mL, Rfl: 0    prasugrel (EFFIENT) 10 MG tablet, Take 1 tablet by mouth Daily., Disp: , Rfl:     ranolazine (RANEXA) 1000 MG 12 hr tablet, TAKE ONE TABLET BY MOUTH TWO TIMES A DAY, Disp: 60 tablet, Rfl: 5    simethicone (Gas-X) 80 MG chewable tablet, Chew 1 tablet Every 6 (Six) Hours As Needed for Flatulence., Disp: 120 tablet, Rfl: 5    tamsulosin (FLOMAX) 0.4 MG capsule 24 hr capsule, Take 1 capsule by mouth Every Night., Disp: , Rfl: 11    Testosterone Cypionate (DEPOTESTOTERONE CYPIONATE) 200 MG/ML injection, Inject 1 mL into the appropriate muscle as directed by prescriber Every 14 (Fourteen) Days. (Patient not taking: Reported on 10/31/2024), Disp: 2 mL, Rfl: 5      Family History   Problem Relation Age of Onset    Colon polyps Other     Heart disease Other          Social History     Socioeconomic History    Marital status:    Tobacco Use    Smoking status: Former     Current packs/day: 0.00     Average packs/day: 3.0 packs/day for 25.0 years (75.0 ttl pk-yrs)     Types: Cigarettes     Start date:      Quit date:      Years since quittin.8    Smokeless tobacco: Never   Vaping Use    Vaping status: Never Used   Substance and Sexual Activity    Alcohol use: Yes     Comment: 2021 - \"Socially\".  \"Preferrably Wine\".    Drug use: No    Sexual activity: Defer         Allergies   Allergen Reactions    Amlodipine Anaphylaxis and Shortness Of Breath     Other reaction(s): SOB    Norvasc [Amlodipine Besylate] Anaphylaxis    Ticagrelor Anaphylaxis    Coumadin [Warfarin " "Sodium] Other (See Comments)     Excessive bleeding    Limonene Other (See Comments)    Lipitor [Atorvastatin] Other (See Comments)     \"affected movement of heart\"    Clopidogrel Rash     Other reaction(s): rash    Enalapril Rash    Plavix [Clopidogrel Bisulfate] Rash    Warfarin GI Bleeding, Other (See Comments) and Rash     Excessive bleeding   Other reaction(s): rash   Bleeding within 2 days of starting   Excessive bleeding         Pain Scale: 3/10 hands        ROS:  Review of Systems   Musculoskeletal:  Positive for gait problem.   Neurological:  Positive for dizziness, seizures (60+ years ago), syncope, weakness, numbness and headaches. Negative for tremors, facial asymmetry, speech difficulty and light-headedness.   Psychiatric/Behavioral:  Positive for confusion and decreased concentration. Negative for agitation, behavioral problems, dysphoric mood, hallucinations, self-injury, sleep disturbance and suicidal ideas. The patient is not nervous/anxious and is not hyperactive.        I have reviewed and agree with the above ROS completed by medical assistant.    Physical Exam:  Vitals:    10/31/24 1400   BP: 132/60   Pulse: 52   SpO2: 99%   Weight: 83 kg (183 lb)     There were no vitals filed for this visit.     Orthostatic BP:       Physical Exam  General: Overweight white male no acute distress  HEENT: Normocephalic no evidence of trauma  Neck: Supple.  No thyromegaly.  Heart: Regular rate and rhythm  Extremities: Radial pulses strong and simultaneous.  Trace pedal edema.      Neurological Exam:   Mental Status: Awake, alert, oriented to person MMSE 8/10.  Conversant without evidence of an affective disorder, thought disorder, delusions or hallucinations.  Attention span and concentration are normal.  HCF: No aphasia, apraxia or dysarthria.  Recent and remote memory intact.  Knowledge of recent events intact.  Total MMSE 24/30, 2/3 delayed recall, normal intersecting pentagons, abnormal hand placement clock " draw and missing the #2 2/4, and 14 animals named in 1 minute  CN: I:   II: Visual fields full without left inattention   III, IV, VI: Eye movements intact without nystagmus or ptosis.  Pupils equal round and reactive to light.   V,VII: Light touch and pinprick intact all 3 divisions of V.  Facial muscles symmetrical.   VIII: Hearing intact to finger rub with bilateral hearing aids   IX,X: Soft palate elevates symmetrically   XI: Sternomastoid and trapezius are strong.   XII: Tongue midline without atrophy or fasciculations    Motor: Normal tone and bulk in the upper and lower extremities.    Power testing: Mild weakness of APB muscles and abductor digiti quinti muscles bilaterally as well as minimal right tricep and finger flexion but good strength in all other muscles tested in the left arm.  Mild weakness of toe extensors but good strength in all other muscles tested in lower extremities.    Reflexes: Upper extremities: +3 diffusely with finger flexion        Lower extremities: Diffusely brisk        Toe signs: Downgoing        Clonus: 5 beat clonus on the right 4 beat on the left        Camilo's: Positive on the right        Positive palmomental reflex    Sensory: Light touch: Reduced on the right fingers more so the thumb and bilateral ankles down        Pinprick: Reduced on the right fingers and right knee down and left ankle down and feet/toes        Vibration: Absent at the ankles reduced at the knees        Position: Intact at the great toes        Temperature:    Cerebellar: Finger-to-nose: Intact           Rapid movement: Slow           Heel-to-shin: Slight dysmetria    Gait and Station: Comes to stand pushing off the chair.  Mildly broad-based.  Able to raise on toes and heels with assist.  Abnormal Romberg.  No drift      Results:    Lab Results   Component Value Date    GLUCOSE 84 10/03/2024    BUN 23 10/03/2024    CREATININE 1.26 10/03/2024    EGFRIFNONA 62 07/20/2021    BCR 18.3 10/03/2024    CO2  23.2 10/03/2024    CALCIUM 8.6 10/03/2024    ALBUMIN 3.1 (L) 10/03/2024    AST 15 09/28/2024    ALT 11 09/28/2024     Lab Results   Component Value Date    WBC 5.14 10/03/2024    HGB 11.6 (L) 10/03/2024    HCT 34.8 (L) 10/03/2024    .4 (H) 10/03/2024     (L) 10/03/2024     Lab Results   Component Value Date    RPR Non-Reactive 09/29/2024     Lab Results   Component Value Date    TSH 2.910 09/29/2024     Lab Results   Component Value Date    ROAVAFXL41 417 09/29/2024     Lab Results   Component Value Date    FOLATE 15.40 09/29/2024     Lab Results   Component Value Date    HGBA1C 4.60 (L) 09/29/2024     Lab Results   Component Value Date    CHOL 106 (L) 07/18/2022     Lab Results   Component Value Date    HDL 63 (H) 02/20/2024     Lab Results   Component Value Date    LDL 62 02/20/2024     Lab Results   Component Value Date    TRIG 88 02/20/2024         Assessment:    1.  Idiopathic peripheral neuropathy-prior labs for treatable causes fairly unremarkable.  Will order a few additional labs today.  Will also check EMG to the right arm and right leg.  2.  Memory deficits- Total MMSE 24/30, 2/3 delayed recall, normal intersecting pentagons, abnormal hand placement clock draw and missing the #2 2/4, and 14 animals named in 1 minute.  Discussed with patient and daughter that we will likely will need neuropsych testing.  Will discuss further at follow-up.  3.  Brisk reflexes, positive Kanwal's and palmomental reflex and clonus bilaterally-remote left craniotomy likely contributing but review of chart patient had CT cervical spine at [facility] which demonstrated moderate to severe degenerative changes throughout most pronounced at the atlantodental joint and C4-C5 level.  4.  Chronic neck pain-patient states he does not want any further workup  5.  Dizziness-reports some improvement after BP meds adjusted but still complains of positional dizziness.  Discussed with patient changing positions slowly and staying  "hydrated.  6.  Falls-patient denies any falls since hospital admission.  Continues to ambulate with assistive device           Assessment and Plan   Diagnoses and all orders for this visit:    1. Paresthesia and pain of both upper extremities (Primary)  -     Copper, Serum  -     Heavy Metals, Blood  -     Immunofixation, Serum  -     Protein Elec + Interp, Serum  -     Vitamin B1, Whole Blood  -     Vitamin B2  -     Vitamin B6  -     Motor and Sensory Neuropathy Evaluation w Reflex  -     EMG & Nerve Conduction Test; Future    2. Paresthesia of both lower extremities  -     Copper, Serum  -     Heavy Metals, Blood  -     Immunofixation, Serum  -     Protein Elec + Interp, Serum  -     Vitamin B1, Whole Blood  -     Vitamin B2  -     Vitamin B6  -     Motor and Sensory Neuropathy Evaluation w Reflex  -     EMG & Nerve Conduction Test; Future    3. Memory deficit    4. Chronic neck and back pain    5. Dizziness    6. Frequent falls    7. Idiopathic peripheral neuropathy        Ideal targets for risk factor control would be Blood pressure < 130/80, B12 > 500, TSH in normal range and LDL < 70; HbA1c < 6.5 and smoking cessation if applicable. Call 911 for stroke symptoms.  Recommend 150 minutes of physical activity weekly.  Limit alcohol intake.  Recommend using assistive device at all times to prevent falls  Stroke Acronym \"BE FAST\" B=Balance issues, E=Vision changes, F=Face weakness or numbness, A=Arm or Leg weakness or numbness, S=Speech problems and T=Time to call 911.   Recommended Lifestyle Modifications:   -Regular physical activity (ie Silver Sneakers programs)  -Regular social activity (ie clubs, Mu-ism groups, etc)  -Regular mental stimulation (ie reading, puzzles, cross-words, crafts, etc)  -Healthy diet (ie Mediterranean or DASH diet)   Follow-up in 2 to 3 months or sooner if needed      Time: Spent 85 minutes in total encounter time. This included time for chart review, obtaining history, performing " pertinent physical examination, updating medical information, ordering tests/referrals, discussion of diagnosis, medical management, counseling, and encounter documentation.        ZENAIDA Marte          Much of this encounter note was dictated utilizing Dragon dictation which is an electronic transcription/translation of spoken language to printed text. The electronic translation of spoken language may permit erroneous, or at times, nonsensical words or phrases to be inadvertently transcribed; Although I have reviewed the note for such errors, some may still exist

## 2024-11-01 LAB
ALBUMIN SERPL ELPH-MCNC: 3.5 G/DL (ref 2.9–4.4)
ALBUMIN/GLOB SERPL: 1 {RATIO} (ref 0.7–1.7)
ALPHA1 GLOB SERPL ELPH-MCNC: 0.3 G/DL (ref 0–0.4)
ALPHA2 GLOB SERPL ELPH-MCNC: 0.8 G/DL (ref 0.4–1)
B-GLOBULIN SERPL ELPH-MCNC: 1.1 G/DL (ref 0.7–1.3)
GAMMA GLOB SERPL ELPH-MCNC: 1.3 G/DL (ref 0.4–1.8)
GLOBULIN SER CALC-MCNC: 3.5 G/DL (ref 2.2–3.9)
IGA SERPL-MCNC: 454 MG/DL (ref 61–437)
IGG SERPL-MCNC: 1274 MG/DL (ref 603–1613)
IGM SERPL-MCNC: 66 MG/DL (ref 15–143)
LABORATORY COMMENT REPORT: NORMAL
M PROTEIN SERPL ELPH-MCNC: NORMAL G/DL
PROT PATTERN SERPL ELPH-IMP: NORMAL
PROT PATTERN SERPL IFE-IMP: ABNORMAL
PROT SERPL-MCNC: 7 G/DL (ref 6–8.5)

## 2024-11-04 ENCOUNTER — OFFICE VISIT (OUTPATIENT)
Dept: CARDIOLOGY | Facility: CLINIC | Age: 84
End: 2024-11-04
Payer: MEDICARE

## 2024-11-04 VITALS
WEIGHT: 182 LBS | OXYGEN SATURATION: 98 % | HEART RATE: 71 BPM | BODY MASS INDEX: 27.58 KG/M2 | HEIGHT: 68 IN | SYSTOLIC BLOOD PRESSURE: 125 MMHG | DIASTOLIC BLOOD PRESSURE: 80 MMHG

## 2024-11-04 DIAGNOSIS — I25.118 CORONARY ARTERY DISEASE OF NATIVE ARTERY OF NATIVE HEART WITH STABLE ANGINA PECTORIS: Primary | ICD-10-CM

## 2024-11-04 DIAGNOSIS — M79.602 PARESTHESIA AND PAIN OF BOTH UPPER EXTREMITIES: ICD-10-CM

## 2024-11-04 DIAGNOSIS — R20.2 PARESTHESIA AND PAIN OF BOTH UPPER EXTREMITIES: ICD-10-CM

## 2024-11-04 DIAGNOSIS — Z95.1 HISTORY OF CORONARY ARTERY BYPASS GRAFT: ICD-10-CM

## 2024-11-04 DIAGNOSIS — R29.6 FREQUENT FALLS: ICD-10-CM

## 2024-11-04 DIAGNOSIS — E78.2 MIXED HYPERLIPIDEMIA: ICD-10-CM

## 2024-11-04 DIAGNOSIS — G62.9 NEUROPATHY: ICD-10-CM

## 2024-11-04 DIAGNOSIS — M79.601 PARESTHESIA AND PAIN OF BOTH UPPER EXTREMITIES: ICD-10-CM

## 2024-11-04 DIAGNOSIS — I50.23 ACUTE ON CHRONIC SYSTOLIC CHF (CONGESTIVE HEART FAILURE), NYHA CLASS 2: ICD-10-CM

## 2024-11-04 PROCEDURE — 1160F RVW MEDS BY RX/DR IN RCRD: CPT | Performed by: NURSE PRACTITIONER

## 2024-11-04 PROCEDURE — G2211 COMPLEX E/M VISIT ADD ON: HCPCS | Performed by: NURSE PRACTITIONER

## 2024-11-04 PROCEDURE — 3074F SYST BP LT 130 MM HG: CPT | Performed by: NURSE PRACTITIONER

## 2024-11-04 PROCEDURE — 3079F DIAST BP 80-89 MM HG: CPT | Performed by: NURSE PRACTITIONER

## 2024-11-04 PROCEDURE — 1159F MED LIST DOCD IN RCRD: CPT | Performed by: NURSE PRACTITIONER

## 2024-11-04 PROCEDURE — 99214 OFFICE O/P EST MOD 30 MIN: CPT | Performed by: NURSE PRACTITIONER

## 2024-11-04 NOTE — PROGRESS NOTES
"    CARDIOLOGY        Patient Name: Dale Mann  :1940  Age: 84 y.o.  Primary Cardiologist: Bhanu Stearns MD  Encounter Provider:  ZENAIDA Corey    Date of Service: 2024      CHIEF COMPLAINT / REASON FOR OFFICE VISIT     Follow-Up (Hospital/CAD/HFrEF)      HISTORY OF PRESENT ILLNESS       HPI  Dale Mann is a 84 y.o. male who presents today for hospital follow-up.     Pt has a  history significant for CAD with prior CABG in the  (LIMA to LAD, SVG to OM, SVG to PDA) (stents, most recent was \"a few years ago). .    Medical record review reveals patient hospitalized  - 10/30/2024.  Patient presented with frequent falls, recurrent syncope.  Patient had an echocardiogram which revealed LVEF of 49% with grade 2 diastolic dysfunction.  Patient was advised to weigh daily.  Patient with neuropathy.  Patient had extensive serologic workup initiated by neurology.    Patient presents today with his daughter.  He is living in assisted living facility and has now gotten approval for PT to assist with gait training.  The daughter notes that he is not in cardiac rehab as insurance has denied this.  He had previously been in cardiac rehab when he resided out of town.  He has had multiple cardiac caths (outlined below).  He notes that he has had some angina, he was previously on Imdur that had to be stopped secondary to orthostasis.      The following portions of the patient's history were reviewed and updated as appropriate: allergies, current medications, past family history, past medical history, past social history, past surgical history and problem list.      VITAL SIGNS     Visit Vitals  /80 (BP Location: Right arm, Patient Position: Sitting)   Pulse 71   Ht 172.7 cm (68\")   Wt 82.6 kg (182 lb)   SpO2 98%   BMI 27.67 kg/m²         Wt Readings from Last 3 Encounters:   24 82.6 kg (182 lb)   10/31/24 83 kg (183 lb)   24 83 kg (183 lb)     Body mass index is " 27.67 kg/m².      REVIEW OF SYSTEMS     Review of Systems   Constitutional: Positive for malaise/fatigue. Negative for chills, fever, weight gain and weight loss.   Cardiovascular:  Positive for chest pain. Negative for leg swelling.   Respiratory:  Negative for cough, snoring and wheezing.    Hematologic/Lymphatic: Negative for bleeding problem. Does not bruise/bleed easily.   Skin:  Negative for color change.   Musculoskeletal:  Negative for falls, joint pain and myalgias.   Gastrointestinal:  Negative for melena.   Genitourinary:  Negative for hematuria.   Neurological:  Positive for loss of balance and weakness. Negative for excessive daytime sleepiness.        Neuropathy   Psychiatric/Behavioral:  Negative for depression. The patient is not nervous/anxious.            PHYSICAL EXAMINATION     Constitutional:       Appearance: Normal appearance. Well-developed.      Comments: Ambulates with walker   Eyes:      Conjunctiva/sclera: Conjunctivae normal.   Neck:      Vascular: No carotid bruit.   Pulmonary:      Effort: Pulmonary effort is normal.      Breath sounds: Normal breath sounds.   Cardiovascular:      Normal rate. Regular rhythm. Normal S1. Normal S2.       Murmurs: There is no murmur.      No gallop.  No click. No rub.   Edema:     Peripheral edema absent.   Musculoskeletal: Normal range of motion. Skin:     General: Skin is warm and dry.   Neurological:      Mental Status: Alert and oriented to person, place, and time.      GCS: GCS eye subscore is 4. GCS verbal subscore is 5. GCS motor subscore is 6.   Psychiatric:         Speech: Speech normal.         Behavior: Behavior normal.         Thought Content: Thought content normal.         Judgment: Judgment normal.           REVIEWED DATA     Procedures    Cardiac Procedures:  Copied from [place] cardiology note:  1. Coronary atherosclerotic heart disease  a. MI mid 1990s  b. CABG x3 in [place], [place] 1990's  (1) LIMA to LAD  (2) SVG to OM  (3) SVG  to PDA  c. Coronary intervention 6/22/07  (1) 3.5 x 13 mm Cypher RUBA to SVG to PDA  d. Coronary intervention 4/29/08  (1) 3.5 x 28 mm Cypher RUBA in SVG to PDA  e. Coronary intervention 10/28/08  (1) 3.5 x 8 mm and 3.5 x 28 Xience RUBA overlapping in SVG to PDA  (2) 2.25 x 12 mm Taxus RUBA to provixal PDA  f. Cardiac catheterization 2/12/2013  (1) LIMA to LAD: patent  (2) SVG to OM: known to be occluded  (3) SVG to PDA: proximal occlusion (NEW FINDING)  (4) LCX: 50% ostial, 50% in AV groove  (5) RCA: occlusion, filled by collaterals  g. Cardiac catheterization 5/12/2016  (1) EF 50%  (2) LIMA to LAD: patent  (3) SVG to OM: known to be occluded  (4) SVG to PDA: known to be occluded  (5) left main: 70% diffuse stenosis  (6) native LAD: 60% ostial in 75% in diagonal  (7) native LCX: 90% ostial/proximal  (8) native ramus: free of disease  (9) native RCA: total occlusion, fills via collaterals  h. Coronary intervention 5/13/2016  (1) Orbital atherectomy to left main into LCX and LAD/diag  (2) 2.75 x 30 mm Resolute RUBA from left main into LCX  (3) 2.5 x 18 mm Resolute RUBA from left main into LAD/diag  i. Cardiac catheterization 1/5/2017 at [facility]  (1) Instent restenosis of left main  (2) Occluded LCX  j. Staged Coronary intervention 1/11/2017  (1) 3.0 x 12 mm Resolute RUBA to left main  k. Coronary angiogram August 22, 2017  (1) LVEF 50% with anterolateral and inferobasal hypokinesis and  LVEDP 27  (2) No change angiographically from the last study in January 2017   Echocardiogram 9/30/2024.  LVEF 49%.  Grade 2 diastolic dysfunction.  Mild to moderate mitral regurgitation.  Estimated RVSP less than 35 mmHg.    Lipid Panel          2/20/2024    16:20   Lipid Panel   Total Cholesterol 143       Triglycerides 88       HDL Cholesterol 63       VLDL Cholesterol 18       LDL Cholesterol  62       LDL/HDL Ratio 0.98          Details          This result is from an external source.               Lab Results   Component Value Date      (L) 10/03/2024     10/02/2024    K 4.2 10/03/2024    K 4.2 10/02/2024     10/03/2024     10/02/2024    CO2 23.2 10/03/2024    CO2 25.5 10/02/2024    BUN 23 10/03/2024    BUN 19 10/02/2024    CREATININE 1.26 10/03/2024    CREATININE 1.21 10/02/2024    EGFRIFNONA 62 07/20/2021    EGFRIFNONA 66 07/15/2020    GLUCOSE 84 10/03/2024    GLUCOSE 85 10/02/2024    CALCIUM 8.6 10/03/2024    CALCIUM 9.1 10/02/2024    PROTENTOTREF 7.0 10/31/2024    ALBUMIN 3.5 10/31/2024    ALBUMIN 3.1 (L) 10/03/2024    BILITOT 0.6 09/28/2024    BILITOT 0.70 07/23/2024    AST 15 09/28/2024    AST 16 07/23/2024    ALT 11 09/28/2024    ALT 11 (L) 07/23/2024     Lab Results   Component Value Date    WBC 5.14 10/03/2024    WBC 4.78 10/02/2024    HGB 11.6 (L) 10/03/2024    HGB 12.0 (L) 10/02/2024    HCT 34.8 (L) 10/03/2024    HCT 35.4 (L) 10/02/2024    .4 (H) 10/03/2024    .0 (H) 10/02/2024     (L) 10/03/2024     (L) 10/02/2024     Lab Results   Component Value Date    PROBNP 577.0 09/28/2024    PROBNP 526 (H) 06/30/2023     Lab Results   Component Value Date    CKTOTAL 43 09/30/2024    TROPONINT 21 09/28/2024     Lab Results   Component Value Date    TSH 2.910 09/29/2024    TSH 1.980 02/09/2023             ASSESSMENT & PLAN     Diagnoses and all orders for this visit:    1. Coronary artery disease of native artery of native heart with stable angina pectoris (Primary)  Assessment & Plan:  History of bypass and multiple stents  Patient with chronic angina  Most recent cardiac catheterization in 2017 at [place] reviewed.  No interventional targets at that time.  Patient's isosorbide mononitrate was discontinued secondary to orthostasis  Continue statin therapy, aspirin, Effient, Ranexa      2. Paresthesia and pain of both upper extremities  -     EMG & Nerve Conduction Test    3. History of coronary artery bypass graft    4. Acute on chronic systolic CHF (congestive heart failure), NYHA class  2  Assessment & Plan:  LVEF 49%  Patient euvolemic  Patient is very deconditioned and is receiving multiple therapies at his assisted living facility  GDMT is limited secondary to neuropathy and orthostasis          5. Mixed hyperlipidemia  Overview:  Last Assessment & Plan:   Continue current statin therapy.    Assessment & Plan:  Continue atorvastatin      6. Frequent falls    7. Neuropathy        Return in about 4 months (around 3/4/2025) for Dr. Stearns- Routine.    Future Appointments         Provider Department Center    2/3/2025 4:00 PM Cathy Atkins APRN [facility] NEUROLOGY [facility]    2/13/2025 10:00 AM Mayank Espinosa MD [facility] NEUROLOGY [facility]    3/28/2025 2:20 PM Bhanu Stearns MD [facility] CARDIOLOGY [facility]              MEDICATIONS         Discharge Medications            Accurate as of November 4, 2024  2:37 PM. If you have any questions, ask your nurse or doctor.                Continue These Medications        Instructions Start Date   albuterol sulfate  (90 Base) MCG/ACT inhaler  Commonly known as: PROVENTIL HFA;VENTOLIN HFA;PROAIR HFA   No dose, route, or frequency recorded.      aspirin 81 MG EC tablet   81 mg, Daily      atorvastatin 40 MG tablet  Commonly known as: LIPITOR   40 mg, Oral, Nightly      Cyanocobalamin 1000 MCG/ML kit   1,000 mcg, Weekly      Dexilant 60 MG capsule  Generic drug: dexlansoprazole   60 mg, Oral, Daily      folic acid 1 MG tablet  Commonly known as: FOLVITE   1 mg, Daily      melatonin 5 MG tablet tablet   5 mg, Nightly PRN      nitroglycerin 0.4 MG SL tablet  Commonly known as: NITROSTAT   PLACE 1 TAB UNDER THE TONGUE EVERY 5 MINUTES AS NEEDED FOR CHEST PAIN. NO MORE THAN 3 DOSES IN 15 MINUTES NEEDS APPOINTMENT.      polyethyl glycol-propyl glycol 0.4-0.3 % solution ophthalmic solution (artificial tears)  Commonly known as: SYSTANE   2 drops, Every 1 Hour PRN      polyethylene glycol 236 g  solution  Commonly known as: GoLYTELY   Starting at noon on day prior to procedure, drink 8 ounces every 30 minutes until all gone or stools are clear. May add flavor packet.      prasugrel 10 MG tablet  Commonly known as: EFFIENT   10 mg, Oral, Daily      ranolazine 1000 MG 12 hr tablet  Commonly known as: RANEXA   TAKE ONE TABLET BY MOUTH TWO TIMES A DAY      simethicone 80 MG chewable tablet  Commonly known as: Gas-X   80 mg, Oral, Every 6 Hours PRN      tamsulosin 0.4 MG capsule 24 hr capsule  Commonly known as: FLOMAX   1 capsule, Nightly      Testosterone Cypionate 200 MG/ML injection  Commonly known as: DEPOTESTOTERONE CYPIONATE   200 mg, Intramuscular, Every 14 Days                   **Dragon Disclaimer:   Much of this encounter note is an electronic transcription/translation of spoken language to printed text. The electronic translation of spoken language may permit erroneous, or at times, nonsensical words or phrases to be inadvertently transcribed. Although I have reviewed the note for such errors, some may still exist.

## 2024-11-04 NOTE — ASSESSMENT & PLAN NOTE
History of bypass and multiple stents  Patient with chronic angina  Most recent cardiac catheterization in 2017 at [place] reviewed.  No interventional targets at that time.  Patient's isosorbide mononitrate was discontinued secondary to orthostasis  Continue statin therapy, aspirin, Effient, Ranexa

## 2024-11-04 NOTE — ASSESSMENT & PLAN NOTE
LVEF 49%  Patient euvolemic  Patient is very deconditioned and is receiving multiple therapies at his assisted living facility  GDMT is limited secondary to neuropathy and orthostasis

## 2024-11-06 LAB
ARSENIC BLD-MCNC: 4 UG/L (ref 0–9)
LEAD BLDV-MCNC: <1 UG/DL (ref 0–3.4)
MERCURY BLD-MCNC: <1 UG/L (ref 0–14.9)

## 2024-11-07 LAB
COPPER SERPL-MCNC: 91 UG/DL (ref 69–132)
VIT B1 BLD-SCNC: 116.9 NMOL/L (ref 66.5–200)

## 2024-11-08 LAB
REF LAB TEST METHOD: NORMAL
VIT B2 BLD-MCNC: 168 UG/L (ref 137–370)

## 2024-11-11 LAB — PYRIDOXAL PHOS SERPL-MCNC: 4.9 UG/L (ref 3.4–65.2)

## 2025-02-05 ENCOUNTER — APPOINTMENT (OUTPATIENT)
Dept: GENERAL RADIOLOGY | Facility: HOSPITAL | Age: 85
End: 2025-02-05
Payer: MEDICARE

## 2025-02-05 LAB
ALBUMIN SERPL-MCNC: 3.1 G/DL (ref 3.5–5.2)
ALBUMIN/GLOB SERPL: 0.9 G/DL
ALP SERPL-CCNC: 101 U/L (ref 39–117)
ALT SERPL W P-5'-P-CCNC: 6 U/L (ref 1–41)
ANION GAP SERPL CALCULATED.3IONS-SCNC: 6.9 MMOL/L (ref 5–15)
AST SERPL-CCNC: 14 U/L (ref 1–40)
BASOPHILS # BLD AUTO: 0.03 10*3/MM3 (ref 0–0.2)
BASOPHILS NFR BLD AUTO: 0.6 % (ref 0–1.5)
BILIRUB SERPL-MCNC: 0.4 MG/DL (ref 0–1.2)
BUN SERPL-MCNC: 17 MG/DL (ref 8–23)
BUN/CREAT SERPL: 13.7 (ref 7–25)
CALCIUM SPEC-SCNC: 9 MG/DL (ref 8.6–10.5)
CHLORIDE SERPL-SCNC: 106 MMOL/L (ref 98–107)
CO2 SERPL-SCNC: 26.1 MMOL/L (ref 22–29)
CREAT SERPL-MCNC: 1.24 MG/DL (ref 0.76–1.27)
DEPRECATED RDW RBC AUTO: 46.4 FL (ref 37–54)
EGFRCR SERPLBLD CKD-EPI 2021: 58 ML/MIN/1.73
EOSINOPHIL # BLD AUTO: 0.16 10*3/MM3 (ref 0–0.4)
EOSINOPHIL NFR BLD AUTO: 3.2 % (ref 0.3–6.2)
ERYTHROCYTE [DISTWIDTH] IN BLOOD BY AUTOMATED COUNT: 13.4 % (ref 12.3–15.4)
GLOBULIN UR ELPH-MCNC: 3.5 GM/DL
GLUCOSE SERPL-MCNC: 108 MG/DL (ref 65–99)
HCT VFR BLD AUTO: 30.6 % (ref 37.5–51)
HGB BLD-MCNC: 10.6 G/DL (ref 13–17.7)
HOLD SPECIMEN: NORMAL
HOLD SPECIMEN: NORMAL
IMM GRANULOCYTES # BLD AUTO: 0.08 10*3/MM3 (ref 0–0.05)
IMM GRANULOCYTES NFR BLD AUTO: 1.6 % (ref 0–0.5)
LYMPHOCYTES # BLD AUTO: 1.39 10*3/MM3 (ref 0.7–3.1)
LYMPHOCYTES NFR BLD AUTO: 27.8 % (ref 19.6–45.3)
MCH RBC QN AUTO: 33 PG (ref 26.6–33)
MCHC RBC AUTO-ENTMCNC: 34.6 G/DL (ref 31.5–35.7)
MCV RBC AUTO: 95.3 FL (ref 79–97)
MONOCYTES # BLD AUTO: 0.44 10*3/MM3 (ref 0.1–0.9)
MONOCYTES NFR BLD AUTO: 8.8 % (ref 5–12)
NEUTROPHILS NFR BLD AUTO: 2.9 10*3/MM3 (ref 1.7–7)
NEUTROPHILS NFR BLD AUTO: 58 % (ref 42.7–76)
NRBC BLD AUTO-RTO: 0 /100 WBC (ref 0–0.2)
PLATELET # BLD AUTO: 134 10*3/MM3 (ref 140–450)
PMV BLD AUTO: 8.5 FL (ref 6–12)
POTASSIUM SERPL-SCNC: 4.4 MMOL/L (ref 3.5–5.2)
PROT SERPL-MCNC: 6.6 G/DL (ref 6–8.5)
RBC # BLD AUTO: 3.21 10*6/MM3 (ref 4.14–5.8)
SODIUM SERPL-SCNC: 139 MMOL/L (ref 136–145)
TROPONIN T SERPL HS-MCNC: 20 NG/L
WBC NRBC COR # BLD AUTO: 5 10*3/MM3 (ref 3.4–10.8)
WHOLE BLOOD HOLD COAG: NORMAL
WHOLE BLOOD HOLD SPECIMEN: NORMAL

## 2025-02-05 PROCEDURE — 36415 COLL VENOUS BLD VENIPUNCTURE: CPT

## 2025-02-05 PROCEDURE — 93005 ELECTROCARDIOGRAM TRACING: CPT | Performed by: EMERGENCY MEDICINE

## 2025-02-05 PROCEDURE — 71045 X-RAY EXAM CHEST 1 VIEW: CPT

## 2025-02-05 PROCEDURE — 93010 ELECTROCARDIOGRAM REPORT: CPT | Performed by: INTERNAL MEDICINE

## 2025-02-05 PROCEDURE — 84484 ASSAY OF TROPONIN QUANT: CPT

## 2025-02-05 PROCEDURE — G0378 HOSPITAL OBSERVATION PER HR: HCPCS

## 2025-02-05 PROCEDURE — 93005 ELECTROCARDIOGRAM TRACING: CPT

## 2025-02-05 PROCEDURE — 80053 COMPREHEN METABOLIC PANEL: CPT

## 2025-02-05 PROCEDURE — 99285 EMERGENCY DEPT VISIT HI MDM: CPT

## 2025-02-05 PROCEDURE — 85025 COMPLETE CBC W/AUTO DIFF WBC: CPT

## 2025-02-05 RX ORDER — ASPIRIN 325 MG
325 TABLET ORAL ONCE
Status: COMPLETED | OUTPATIENT
Start: 2025-02-05 | End: 2025-02-06

## 2025-02-05 RX ORDER — SODIUM CHLORIDE 0.9 % (FLUSH) 0.9 %
10 SYRINGE (ML) INJECTION AS NEEDED
Status: DISCONTINUED | OUTPATIENT
Start: 2025-02-05 | End: 2025-02-06 | Stop reason: HOSPADM

## 2025-02-06 ENCOUNTER — HOSPITAL ENCOUNTER (OUTPATIENT)
Facility: HOSPITAL | Age: 85
Setting detail: OBSERVATION
Discharge: HOME | End: 2025-02-06
Attending: EMERGENCY MEDICINE | Admitting: EMERGENCY MEDICINE
Payer: MEDICARE

## 2025-02-06 ENCOUNTER — APPOINTMENT (OUTPATIENT)
Dept: CT IMAGING | Facility: HOSPITAL | Age: 85
End: 2025-02-06
Payer: MEDICARE

## 2025-02-06 VITALS
TEMPERATURE: 97.9 F | RESPIRATION RATE: 18 BRPM | OXYGEN SATURATION: 97 % | BODY MASS INDEX: 26.05 KG/M2 | DIASTOLIC BLOOD PRESSURE: 54 MMHG | HEART RATE: 68 BPM | SYSTOLIC BLOOD PRESSURE: 122 MMHG | HEIGHT: 70 IN | WEIGHT: 182 LBS

## 2025-02-06 DIAGNOSIS — R07.9 CHEST PAIN, UNSPECIFIED TYPE: Primary | ICD-10-CM

## 2025-02-06 PROBLEM — R55 SYNCOPE: Status: ACTIVE | Noted: 2025-02-06

## 2025-02-06 LAB
ANION GAP SERPL CALCULATED.3IONS-SCNC: 5.7 MMOL/L (ref 5–15)
B PARAPERT DNA SPEC QL NAA+PROBE: NOT DETECTED
B PERT DNA SPEC QL NAA+PROBE: NOT DETECTED
BACTERIA UR QL AUTO: NORMAL /HPF
BASOPHILS # BLD MANUAL: 0 10*3/MM3 (ref 0–0.2)
BASOPHILS NFR BLD MANUAL: 0 % (ref 0–1.5)
BILIRUB UR QL STRIP: NEGATIVE
BUN SERPL-MCNC: 17 MG/DL (ref 8–23)
BUN/CREAT SERPL: 16 (ref 7–25)
C PNEUM DNA NPH QL NAA+NON-PROBE: NOT DETECTED
CALCIUM SPEC-SCNC: 8.8 MG/DL (ref 8.6–10.5)
CHLORIDE SERPL-SCNC: 106 MMOL/L (ref 98–107)
CHOLEST SERPL-MCNC: 106 MG/DL (ref 0–200)
CLARITY UR: CLEAR
CO2 SERPL-SCNC: 26.3 MMOL/L (ref 22–29)
COLOR UR: ABNORMAL
CREAT SERPL-MCNC: 1.06 MG/DL (ref 0.76–1.27)
DEPRECATED RDW RBC AUTO: 47.4 FL (ref 37–54)
EGFRCR SERPLBLD CKD-EPI 2021: 70.1 ML/MIN/1.73
ELLIPTOCYTES BLD QL SMEAR: ABNORMAL
EOSINOPHIL # BLD MANUAL: 0.12 10*3/MM3 (ref 0–0.4)
EOSINOPHIL NFR BLD MANUAL: 2.1 % (ref 0.3–6.2)
ERYTHROCYTE [DISTWIDTH] IN BLOOD BY AUTOMATED COUNT: 13.5 % (ref 12.3–15.4)
FLUAV SUBTYP SPEC NAA+PROBE: NOT DETECTED
FLUBV RNA ISLT QL NAA+PROBE: NOT DETECTED
GEN 5 1HR TROPONIN T REFLEX: 21 NG/L
GLUCOSE SERPL-MCNC: 104 MG/DL (ref 65–99)
GLUCOSE UR STRIP-MCNC: NEGATIVE MG/DL
HADV DNA SPEC NAA+PROBE: NOT DETECTED
HCOV 229E RNA SPEC QL NAA+PROBE: NOT DETECTED
HCOV HKU1 RNA SPEC QL NAA+PROBE: NOT DETECTED
HCOV NL63 RNA SPEC QL NAA+PROBE: NOT DETECTED
HCOV OC43 RNA SPEC QL NAA+PROBE: NOT DETECTED
HCT VFR BLD AUTO: 29.1 % (ref 37.5–51)
HCT VFR BLD AUTO: 30.4 % (ref 37.5–51)
HDLC SERPL-MCNC: 53 MG/DL (ref 40–60)
HGB BLD-MCNC: 10 G/DL (ref 13–17.7)
HGB BLD-MCNC: 10.4 G/DL (ref 13–17.7)
HGB UR QL STRIP.AUTO: NEGATIVE
HMPV RNA NPH QL NAA+NON-PROBE: NOT DETECTED
HPIV1 RNA ISLT QL NAA+PROBE: NOT DETECTED
HPIV2 RNA SPEC QL NAA+PROBE: NOT DETECTED
HPIV3 RNA NPH QL NAA+PROBE: NOT DETECTED
HPIV4 P GENE NPH QL NAA+PROBE: NOT DETECTED
HYALINE CASTS UR QL AUTO: NORMAL /LPF
KETONES UR QL STRIP: ABNORMAL
LDLC SERPL CALC-MCNC: 39 MG/DL (ref 0–100)
LDLC/HDLC SERPL: 0.75 {RATIO}
LEUKOCYTE ESTERASE UR QL STRIP.AUTO: ABNORMAL
LYMPHOCYTES # BLD MANUAL: 0.92 10*3/MM3 (ref 0.7–3.1)
LYMPHOCYTES NFR BLD MANUAL: 3.1 % (ref 5–12)
M PNEUMO IGG SER IA-ACNC: NOT DETECTED
MCH RBC QN AUTO: 33.2 PG (ref 26.6–33)
MCHC RBC AUTO-ENTMCNC: 34.4 G/DL (ref 31.5–35.7)
MCV RBC AUTO: 96.7 FL (ref 79–97)
MONOCYTES # BLD: 0.17 10*3/MM3 (ref 0.1–0.9)
NEUTROPHILS # BLD AUTO: 4.28 10*3/MM3 (ref 1.7–7)
NEUTROPHILS NFR BLD MANUAL: 78.1 % (ref 42.7–76)
NITRITE UR QL STRIP: NEGATIVE
NT-PROBNP SERPL-MCNC: 804 PG/ML (ref 0–1800)
OVALOCYTES BLD QL SMEAR: ABNORMAL
PH UR STRIP.AUTO: 6 [PH] (ref 5–8)
PLAT MORPH BLD: NORMAL
PLATELET # BLD AUTO: 109 10*3/MM3 (ref 140–450)
PMV BLD AUTO: 8.4 FL (ref 6–12)
POIKILOCYTOSIS BLD QL SMEAR: ABNORMAL
POLYCHROMASIA BLD QL SMEAR: ABNORMAL
POTASSIUM SERPL-SCNC: 4.6 MMOL/L (ref 3.5–5.2)
PROT UR QL STRIP: ABNORMAL
QT INTERVAL: 490 MS
QTC INTERVAL: 492 MS
RBC # BLD AUTO: 3.01 10*6/MM3 (ref 4.14–5.8)
RBC # UR STRIP: NORMAL /HPF
REF LAB TEST METHOD: NORMAL
RHINOVIRUS RNA SPEC NAA+PROBE: NOT DETECTED
RSV RNA NPH QL NAA+NON-PROBE: NOT DETECTED
SARS-COV-2 RNA NPH QL NAA+NON-PROBE: NOT DETECTED
SODIUM SERPL-SCNC: 138 MMOL/L (ref 136–145)
SP GR UR STRIP: 1.02 (ref 1–1.03)
SQUAMOUS #/AREA URNS HPF: NORMAL /HPF
TRIGL SERPL-MCNC: 65 MG/DL (ref 0–150)
TROPONIN T NUMERIC DELTA: 1 NG/L
TROPONIN T SERPL HS-MCNC: 19 NG/L
TSH SERPL DL<=0.05 MIU/L-ACNC: 0.86 UIU/ML (ref 0.27–4.2)
UROBILINOGEN UR QL STRIP: ABNORMAL
VARIANT LYMPHS NFR BLD MANUAL: 16.7 % (ref 19.6–45.3)
VLDLC SERPL-MCNC: 14 MG/DL (ref 5–40)
WBC # UR STRIP: NORMAL /HPF
WBC MORPH BLD: NORMAL
WBC NRBC COR # BLD AUTO: 5.48 10*3/MM3 (ref 3.4–10.8)

## 2025-02-06 PROCEDURE — G0378 HOSPITAL OBSERVATION PER HR: HCPCS

## 2025-02-06 PROCEDURE — 85027 COMPLETE CBC AUTOMATED: CPT | Performed by: PHYSICIAN ASSISTANT

## 2025-02-06 PROCEDURE — 85007 BL SMEAR W/DIFF WBC COUNT: CPT | Performed by: PHYSICIAN ASSISTANT

## 2025-02-06 PROCEDURE — 0202U NFCT DS 22 TRGT SARS-COV-2: CPT | Performed by: EMERGENCY MEDICINE

## 2025-02-06 PROCEDURE — 83880 ASSAY OF NATRIURETIC PEPTIDE: CPT | Performed by: PHYSICIAN ASSISTANT

## 2025-02-06 PROCEDURE — 97161 PT EVAL LOW COMPLEX 20 MIN: CPT

## 2025-02-06 PROCEDURE — 84484 ASSAY OF TROPONIN QUANT: CPT | Performed by: EMERGENCY MEDICINE

## 2025-02-06 PROCEDURE — 72125 CT NECK SPINE W/O DYE: CPT

## 2025-02-06 PROCEDURE — 85014 HEMATOCRIT: CPT | Performed by: PHYSICIAN ASSISTANT

## 2025-02-06 PROCEDURE — 80048 BASIC METABOLIC PNL TOTAL CA: CPT | Performed by: PHYSICIAN ASSISTANT

## 2025-02-06 PROCEDURE — 85018 HEMOGLOBIN: CPT | Performed by: PHYSICIAN ASSISTANT

## 2025-02-06 PROCEDURE — 80061 LIPID PANEL: CPT | Performed by: PHYSICIAN ASSISTANT

## 2025-02-06 PROCEDURE — 84484 ASSAY OF TROPONIN QUANT: CPT | Performed by: PHYSICIAN ASSISTANT

## 2025-02-06 PROCEDURE — 84443 ASSAY THYROID STIM HORMONE: CPT | Performed by: PHYSICIAN ASSISTANT

## 2025-02-06 PROCEDURE — 70450 CT HEAD/BRAIN W/O DYE: CPT

## 2025-02-06 PROCEDURE — 81001 URINALYSIS AUTO W/SCOPE: CPT | Performed by: PHYSICIAN ASSISTANT

## 2025-02-06 PROCEDURE — 99214 OFFICE O/P EST MOD 30 MIN: CPT | Performed by: NURSE PRACTITIONER

## 2025-02-06 RX ORDER — BISACODYL 5 MG/1
5 TABLET, DELAYED RELEASE ORAL DAILY PRN
Status: DISCONTINUED | OUTPATIENT
Start: 2025-02-06 | End: 2025-02-06 | Stop reason: HOSPADM

## 2025-02-06 RX ORDER — ONDANSETRON 4 MG/1
4 TABLET, ORALLY DISINTEGRATING ORAL EVERY 6 HOURS PRN
Status: DISCONTINUED | OUTPATIENT
Start: 2025-02-06 | End: 2025-02-06 | Stop reason: HOSPADM

## 2025-02-06 RX ORDER — SODIUM CHLORIDE 0.9 % (FLUSH) 0.9 %
10 SYRINGE (ML) INJECTION EVERY 12 HOURS SCHEDULED
Status: DISCONTINUED | OUTPATIENT
Start: 2025-02-06 | End: 2025-02-06 | Stop reason: HOSPADM

## 2025-02-06 RX ORDER — NITROGLYCERIN 0.4 MG/1
0.4 TABLET SUBLINGUAL
Status: DISCONTINUED | OUTPATIENT
Start: 2025-02-06 | End: 2025-02-06 | Stop reason: HOSPADM

## 2025-02-06 RX ORDER — ASPIRIN 81 MG/1
81 TABLET ORAL DAILY
Status: DISCONTINUED | OUTPATIENT
Start: 2025-02-07 | End: 2025-02-06 | Stop reason: HOSPADM

## 2025-02-06 RX ORDER — SODIUM CHLORIDE 9 MG/ML
40 INJECTION, SOLUTION INTRAVENOUS AS NEEDED
Status: DISCONTINUED | OUTPATIENT
Start: 2025-02-06 | End: 2025-02-06 | Stop reason: HOSPADM

## 2025-02-06 RX ORDER — PRASUGREL 10 MG/1
10 TABLET, FILM COATED ORAL DAILY
Status: DISCONTINUED | OUTPATIENT
Start: 2025-02-06 | End: 2025-02-06 | Stop reason: HOSPADM

## 2025-02-06 RX ORDER — METOPROLOL SUCCINATE 25 MG/1
12.5 TABLET, EXTENDED RELEASE ORAL NIGHTLY
Qty: 45 TABLET | Refills: 0 | Status: SHIPPED | OUTPATIENT
Start: 2025-02-06 | End: 2025-05-07

## 2025-02-06 RX ORDER — AMOXICILLIN 250 MG
2 CAPSULE ORAL 2 TIMES DAILY PRN
Status: DISCONTINUED | OUTPATIENT
Start: 2025-02-06 | End: 2025-02-06 | Stop reason: HOSPADM

## 2025-02-06 RX ORDER — SODIUM CHLORIDE 0.9 % (FLUSH) 0.9 %
10 SYRINGE (ML) INJECTION AS NEEDED
Status: DISCONTINUED | OUTPATIENT
Start: 2025-02-06 | End: 2025-02-06 | Stop reason: HOSPADM

## 2025-02-06 RX ORDER — FOLIC ACID 1 MG/1
1 TABLET ORAL DAILY
Status: DISCONTINUED | OUTPATIENT
Start: 2025-02-06 | End: 2025-02-06 | Stop reason: HOSPADM

## 2025-02-06 RX ORDER — ACETAMINOPHEN 325 MG/1
650 TABLET ORAL EVERY 4 HOURS PRN
Status: DISCONTINUED | OUTPATIENT
Start: 2025-02-06 | End: 2025-02-06 | Stop reason: HOSPADM

## 2025-02-06 RX ORDER — TAMSULOSIN HYDROCHLORIDE 0.4 MG/1
0.4 CAPSULE ORAL NIGHTLY
Status: DISCONTINUED | OUTPATIENT
Start: 2025-02-06 | End: 2025-02-06 | Stop reason: HOSPADM

## 2025-02-06 RX ORDER — ALBUTEROL SULFATE 0.83 MG/ML
2.5 SOLUTION RESPIRATORY (INHALATION) EVERY 6 HOURS PRN
Status: DISCONTINUED | OUTPATIENT
Start: 2025-02-06 | End: 2025-02-06 | Stop reason: HOSPADM

## 2025-02-06 RX ORDER — PANTOPRAZOLE SODIUM 40 MG/1
40 TABLET, DELAYED RELEASE ORAL
Status: DISCONTINUED | OUTPATIENT
Start: 2025-02-06 | End: 2025-02-06 | Stop reason: HOSPADM

## 2025-02-06 RX ORDER — METOPROLOL SUCCINATE 25 MG/1
12.5 TABLET, EXTENDED RELEASE ORAL NIGHTLY
Status: DISCONTINUED | OUTPATIENT
Start: 2025-02-06 | End: 2025-02-06 | Stop reason: HOSPADM

## 2025-02-06 RX ORDER — ONDANSETRON 2 MG/ML
4 INJECTION INTRAMUSCULAR; INTRAVENOUS EVERY 6 HOURS PRN
Status: DISCONTINUED | OUTPATIENT
Start: 2025-02-06 | End: 2025-02-06 | Stop reason: HOSPADM

## 2025-02-06 RX ORDER — RANOLAZINE 500 MG/1
1000 TABLET, EXTENDED RELEASE ORAL 2 TIMES DAILY
Status: DISCONTINUED | OUTPATIENT
Start: 2025-02-06 | End: 2025-02-06 | Stop reason: HOSPADM

## 2025-02-06 RX ORDER — METOPROLOL SUCCINATE 25 MG/1
12.5 TABLET, EXTENDED RELEASE ORAL NIGHTLY
Qty: 30 TABLET | Refills: 11 | Status: SHIPPED | OUTPATIENT
Start: 2025-02-06 | End: 2025-02-06

## 2025-02-06 RX ORDER — BISACODYL 10 MG
10 SUPPOSITORY, RECTAL RECTAL DAILY PRN
Status: DISCONTINUED | OUTPATIENT
Start: 2025-02-06 | End: 2025-02-06 | Stop reason: HOSPADM

## 2025-02-06 RX ORDER — POLYETHYLENE GLYCOL 3350 17 G/17G
17 POWDER, FOR SOLUTION ORAL DAILY PRN
Status: DISCONTINUED | OUTPATIENT
Start: 2025-02-06 | End: 2025-02-06 | Stop reason: HOSPADM

## 2025-02-06 RX ADMIN — PANTOPRAZOLE SODIUM 40 MG: 40 TABLET, DELAYED RELEASE ORAL at 17:54

## 2025-02-06 RX ADMIN — PRASUGREL 10 MG: 10 TABLET, FILM COATED ORAL at 08:44

## 2025-02-06 RX ADMIN — Medication 10 ML: at 08:45

## 2025-02-06 RX ADMIN — RANOLAZINE 1000 MG: 500 TABLET, FILM COATED, EXTENDED RELEASE ORAL at 08:44

## 2025-02-06 RX ADMIN — Medication 10 ML: at 03:58

## 2025-02-06 RX ADMIN — PANTOPRAZOLE SODIUM 40 MG: 40 TABLET, DELAYED RELEASE ORAL at 07:47

## 2025-02-06 RX ADMIN — TAMSULOSIN HYDROCHLORIDE 0.4 MG: 0.4 CAPSULE ORAL at 07:47

## 2025-02-06 RX ADMIN — ASPIRIN 325 MG ORAL TABLET 325 MG: 325 PILL ORAL at 00:56

## 2025-02-06 RX ADMIN — FOLIC ACID 1 MG: 1 TABLET ORAL at 08:44

## 2025-02-06 NOTE — CASE MANAGEMENT/SOCIAL WORK
Discharge Planning Assessment  [facility]     Patient Name: Dale Mann  MRN: [id number]  Today's Date: 2/6/2025    Admit Date: 2/6/2025    Plan: Plan for patient to return to [facility]. Will require wheelchair van transport. No other needs anticipated.   Discharge Needs Assessment       Row Name 02/06/25 1308       Living Environment    People in Home facility resident    Name(s) of People in Home [facility] ([facility]).    Current Living Arrangements assisted living facility    Potentially Unsafe Housing Conditions none    Primary Care Provided by other (see comments);child(hugo)  [facility]    Provides Primary Care For no one, unable/limited ability to care for self    Family Caregiver if Needed child(hugo), adult    Family Caregiver Names Satinder (daughter)    Quality of Family Relationships helpful;involved;supportive    Able to Return to Prior Arrangements yes    Living Arrangement Comments Patient resides in [facility] at [facility]. No vocalized environmental concerns.       Resource/Environmental Concerns    Resource/Environmental Concerns none    Transportation Concerns none       Transition Planning    Patient/Family Anticipates Transition to home    Patient/Family Anticipated Services at Transition none    Transportation Anticipated health plan transportation  Patient will require wheelchair van transport.       Discharge Needs Assessment    Equipment Currently Used at Home rollator    Concerns to be Addressed no discharge needs identified;denies needs/concerns at this time    Anticipated Changes Related to Illness none    Equipment Needed After Discharge none    Patient's Choice of Community Agency(s) Return to [facility] at [facility]                   Discharge Plan       Row Name 02/06/25 2160       Plan    Plan Plan for patient to return to [facility] at [facility]. Will require wheelchair van transport. No other needs anticipated.    Patient/Family in Agreement  with Plan yes    Plan Comments Patient plans to return home upon discharge, where he lives in [facility]. No vocalized environmental concerns. At baseline, patient is MOD I with use of rollator. Patient uses [facility] on [address]. Not enrolled in [facility]. No current HH/rehab. Anticipate discharge today back to [facility] via wheelchair van. CCP to arrange transport.                  Continued Care and Services - Admitted Since 2/6/2025    No active coordination exists for this encounter.          Demographic Summary       Row Name 02/06/25 1302       General Information    Admission Type observation    Arrived From home  [facility]    Required Notices Provided Observation Status Notice  Requested registration provide patient with MOON notice.    Referral Source admission list;emergency department    Reason for Consult discharge planning    Preferred Language English    General Information Comments Facesheet verified. Role of CCP explained. Appropriate PPE worn at all times.       Contact Information    Permission Granted to Share Info With family/designee  Patient requested CCP speak with daughter.                   Functional Status       Row Name 02/06/25 1307       Functional Status    Usual Activity Tolerance good    Current Activity Tolerance good       Functional Status, IADL    Medications assistive equipment and person    Meal Preparation assistive equipment and person    Housekeeping assistive equipment and person    Laundry assistive equipment and person    Shopping assistive equipment and person    IADL Comments Patient resides in [facility] [place] [facility] [place]. At baseline, reports MOD I with IADL's with use of Rollator.       Mental Status    General Appearance WDL WDL                   Psychosocial       Row Name 02/06/25 1307       Behavior WDL    Behavior WDL WDL       Emotion Mood WDL    Emotion/Mood/Affect WDL WDL       Speech WDL    Speech WDL WDL       Perceptual State WDL     Perceptual State WDL WDL       Thought Process WDL    Thought Process WDL WDL       Intellectual Performance WDL    Intellectual Performance WDL WDL       Coping/Stress    Major Change/Loss/Stressor none    Patient Personal Strengths expressive of needs;expressive of emotions;strong support system    Sources of Support adult child(hugo)       Developmental Stage (Eriksson's Stages of Development)    Developmental Stage Stage 8 (65 years-death/Late Adulthood) Integrity vs. Despair                   Abuse/Neglect    No documentation.                  Legal    No documentation.                  Substance Abuse    No documentation.                  Patient Forms    No documentation.                     Will Cortez RN

## 2025-02-06 NOTE — ED NOTES
"Nursing report ED to floor  Dale Mann  84 y.o.  male    HPI :  HPI  Stated Reason for Visit: dementia; unsure if pt took nitro or not  History Obtained From: EMS    Chief Complaint  Chief Complaint   Patient presents with    Dementia       Admitting doctor:   Romulo Hodges MD    Admitting diagnosis:   The encounter diagnosis was Chest pain, unspecified type.    Code status:   Current Code Status       Date Active Code Status Order ID Comments User Context       2/6/2025 0242 CPR (Attempt to Resuscitate) [id number]  Aditya Ritchie PA ED        Question Answer    Code Status (Patient has no pulse and is not breathing) CPR (Attempt to Resuscitate)    Medical Interventions (Patient has pulse or is breathing) Full Support                    Allergies:   Amlodipine, Norvasc [amlodipine besylate], Ticagrelor, Coumadin [warfarin sodium], Limonene, Lipitor [atorvastatin], Clopidogrel, Enalapril, Plavix [clopidogrel bisulfate], and Warfarin    Isolation:   No active isolations    Intake and Output  No intake or output data in the 24 hours ending 02/06/25 0251    Weight:       02/05/25 2240   Weight: 77.1 kg (170 lb)       Most recent vitals:   Vitals:    02/05/25 2240 02/06/25 0100   BP: 126/78 117/51   Pulse: 60 64   Resp: 17    Temp: 97.2 °F (36.2 °C)    SpO2: 97% 96%   Weight: 77.1 kg (170 lb)    Height: 177.8 cm (70\")        Active LDAs/IV Access:   Lines, Drains & Airways       Active LDAs       Name Placement date Placement time Site Days    Peripheral IV 02/05/25 2240 Right Antecubital 02/05/25 2240  Antecubital  less than 1                    Labs (abnormal labs have a star):   Labs Reviewed   COMPREHENSIVE METABOLIC PANEL - Abnormal; Notable for the following components:       Result Value    Glucose 108 (*)     Albumin 3.1 (*)     eGFR 58.0 (*)     All other components within normal limits    Narrative:     GFR Categories in Chronic Kidney Disease (CKD)      GFR Category          GFR (mL/min/1.73)   "  Interpretation  G1                     90 or greater         Normal or high (1)  G2                      60-89                Mild decrease (1)  G3a                   45-59                Mild to moderate decrease  G3b                   30-44                Moderate to severe decrease  G4                    15-29                Severe decrease  G5                    14 or less           Kidney failure          (1)In the absence of evidence of kidney disease, neither GFR category G1 or G2 fulfill the criteria for CKD.    eGFR calculation 2021 CKD-EPI creatinine equation, which does not include race as a factor   CBC WITH AUTO DIFFERENTIAL - Abnormal; Notable for the following components:    RBC 3.21 (*)     Hemoglobin 10.6 (*)     Hematocrit 30.6 (*)     Platelets 134 (*)     Immature Grans % 1.6 (*)     Immature Grans, Absolute 0.08 (*)     All other components within normal limits   RESPIRATORY PANEL PCR W/ COVID-19 (SARS-COV-2), NP SWAB IN UTM/VTP, 2 HR TAT - Normal    Narrative:     In the setting of a positive respiratory panel with a viral infection PLUS a negative procalcitonin without other underlying concern for bacterial infection, consider observing off antibiotics or discontinuation of antibiotics and continue supportive care. If the respiratory panel is positive for atypical bacterial infection (Bordetella pertussis, Chlamydophila pneumoniae, or Mycoplasma pneumoniae), consider antibiotic de-escalation to target atypical bacterial infection.   TROPONIN - Normal    Narrative:     High Sensitive Troponin T Reference Range:  <14.0 ng/L- Negative Female for AMI  <22.0 ng/L- Negative Male for AMI  >=14 - Abnormal Female indicating possible myocardial injury.  >=22 - Abnormal Male indicating possible myocardial injury.   Clinicians would have to utilize clinical acumen, EKG, Troponin, and serial changes to determine if it is an Acute Myocardial Infarction or myocardial injury due to an underlying chronic  condition.        HIGH SENSITIVITIY TROPONIN T 1HR - Normal    Narrative:     High Sensitive Troponin T Reference Range:  <14.0 ng/L- Negative Female for AMI  <22.0 ng/L- Negative Male for AMI  >=14 - Abnormal Female indicating possible myocardial injury.  >=22 - Abnormal Male indicating possible myocardial injury.   Clinicians would have to utilize clinical acumen, EKG, Troponin, and serial changes to determine if it is an Acute Myocardial Infarction or myocardial injury due to an underlying chronic condition.        RAINBOW DRAW    Narrative:     The following orders were created for panel order [facility] Draw.  Procedure                               Abnormality         Status                     ---------                               -----------         ------                     Green Top (Gel)[[id number]]                                  Final result               Lavender Top[[id number]]                                     Final result               Gold Top - SST[[id number]]                                   Final result               Light Blue Top[[id number]]                                   Final result                 Please view results for these tests on the individual orders.   BASIC METABOLIC PANEL   TROPONIN   BNP (IN-HOUSE)   MANUAL DIFFERENTIAL   CBC WITH AUTO DIFFERENTIAL   TSH   LIPID PANEL   CBC AND DIFFERENTIAL    Narrative:     The following orders were created for panel order CBC & Differential.  Procedure                               Abnormality         Status                     ---------                               -----------         ------                     CBC Auto Differential[[id number]]        Abnormal            Final result                 Please view results for these tests on the individual orders.   GREEN TOP   LAVENDER TOP   GOLD TOP - SST   LIGHT BLUE TOP   CBC AND DIFFERENTIAL    Narrative:     The following orders were created for panel order CBC & Differential.  Procedure                                Abnormality         Status                     ---------                               -----------         ------                     Manual Differential[[id number]]                                                         CBC Auto Differential[[id number]]                                                         Please view results for these tests on the individual orders.       EKG:   ECG 12 Lead ED Triage Standing Order; Chest Pain   Preliminary Result   HEART RATE=60  bpm   RR Gnnvscca=552  ms   ID Eghvjhxb=112  ms   P Horizontal Axis=-20  deg   P Front Axis=32  deg   QRSD Hbgykdgx=355  ms   QT Bqfbgrvp=145  ms   ACqM=218  ms   QRS Axis=-27  deg   T Wave Axis=119  deg   - ABNORMAL ECG -   Sinus rhythm   Prolonged ID interval   Nonspecific intraventricular conduction delay   Probable anterior infarct, age indeterminate   Date and Time of Study:2025-02-05 22:55:05          Meds given in ED:   Medications   sodium chloride 0.9 % flush 10 mL (has no administration in time range)   nitroglycerin (NITROSTAT) SL tablet 0.4 mg (has no administration in time range)   sodium chloride 0.9 % flush 10 mL (has no administration in time range)   sodium chloride 0.9 % flush 10 mL (has no administration in time range)   sodium chloride 0.9 % infusion 40 mL (has no administration in time range)   acetaminophen (TYLENOL) tablet 650 mg (has no administration in time range)   sennosides-docusate (PERICOLACE) 8.6-50 MG per tablet 2 tablet (has no administration in time range)     And   polyethylene glycol (MIRALAX) packet 17 g (has no administration in time range)     And   bisacodyl (DULCOLAX) EC tablet 5 mg (has no administration in time range)     And   bisacodyl (DULCOLAX) suppository 10 mg (has no administration in time range)   Potassium Replacement - Follow Nurse / BPA Driven Protocol (has no administration in time range)   Magnesium Standard Dose Replacement - Follow Nurse / BPA Driven Protocol (has no  "administration in time range)   Phosphorus Replacement - Follow Nurse / BPA Driven Protocol (has no administration in time range)   Calcium Replacement - Follow Nurse / BPA Driven Protocol (has no administration in time range)   ondansetron ODT (ZOFRAN-ODT) disintegrating tablet 4 mg (has no administration in time range)     Or   ondansetron (ZOFRAN) injection 4 mg (has no administration in time range)   melatonin tablet 5 mg (has no administration in time range)   aspirin tablet 325 mg (325 mg Oral Given 25 0056)       Imaging results:  CT Cervical Spine Without Contrast    Result Date: 2025  No acute fracture or subluxation identified.  Radiation dose reduction techniques were utilized, including automated exposure control and exposure modulation based on body size.   This report was finalized on 2025 2:01 AM by Dr. Leny Scott M.D on Workstation: [id number]      CT Head Without Contrast    Result Date: 2025  No acute intracranial findings.  Radiation dose reduction techniques were utilized, including automated exposure control and exposure modulation based on body size.   This report was finalized on 2025 1:57 AM by Dr. Leny Scott M.D on Workstation: [employer]      XR Chest 1 View    Result Date: 2025  Cardiomegaly with suspected vascular congestion.  This report was finalized on 2025 11:33 PM by Dr. Leny Scott M.D on Workstation: [employer]       Ambulatory status:   - Standby    Social issues:   Social History     Socioeconomic History    Marital status:    Tobacco Use    Smoking status: Former     Current packs/day: 0.00     Average packs/day: 3.0 packs/day for 25.0 years (75.0 ttl pk-yrs)     Types: Cigarettes     Start date:      Quit date:      Years since quittin.1    Smokeless tobacco: Never   Vaping Use    Vaping status: Never Used   Substance and Sexual Activity    Alcohol use: Yes     Comment: 2021 - \"Socially\".  " "\"Preferrably Wine\".    Drug use: No    Sexual activity: Defer       Peripheral Neurovascular  Peripheral Neurovascular (Adult)  Peripheral Neurovascular WDL: WDL    Neuro Cognitive  Neuro Cognitive (Adult)  Cognitive/Neuro/Behavioral WDL: WDL, orientation  Orientation: oriented x 4    Learning  Learning Assessment  Learning Readiness and Ability: no barriers identified  Education Provided  Person Taught: patient  Teaching Method: verbal instruction  Teaching Focus: symptom/problem overview  Education Outcome Evaluation: verbalizes understanding    Respiratory  Respiratory  Airway WDL: WDL  Respiratory WDL  Respiratory WDL: WDL    Abdominal Pain       Pain Assessments  Pain (Adult)  (0-10) Pain Rating: Rest: 0  (0-10) Pain Rating: Activity: 0    NIH Stroke Scale       Liz Lombardo RN  02/06/25 02:51 EST    "

## 2025-02-06 NOTE — PLAN OF CARE
Goal Outcome Evaluation:   Patient has been discharged from the unit to a private vehicle after having IV removed and gathering personal belongings. Discharge and follow up info was shared and a verbal understanding relayed back to the nurse.
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
## 2025-02-06 NOTE — PLAN OF CARE
Goal Outcome Evaluation:      Patient received medications throughout the day. Patient slept most of the day. Patient was pleasant throughout the day.

## 2025-02-06 NOTE — PLAN OF CARE
Goal Outcome Evaluation:         Pt admitted for chest pain, patient has not reported any pain after arriving to the unit. Patient comes from [facility]. A+Ox4, first degree heart block noted, RA, VSS, assist x1. Cardiology consult in the morning.
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
## 2025-02-06 NOTE — ED NOTES
Pt to ED via EMS from [facility]. EMS states the initial call was for unresponsiveness and not breathing but when EMS got there the pt was in fact responsive and breathing. Prior to EMS arrival, fire states they found a bottle of nitro spilt over the floor. It is unknown if the pt actually took the nitro or not, or how many was taken. Pt denies any pain.    Pt has dementia and A&O x3 at baseline.

## 2025-02-06 NOTE — PLAN OF CARE
Goal Outcome Evaluation:  Plan of Care Reviewed With: patient           Outcome Evaluation: Pt is a 84 YOM w/ male who presents with concern for chest pain. Pt has history of dementia.  The patient reports that he has had chest pain for 5 months. A+Ox4, first degree heart block was noted. Before admission pt was living at an assisted living facility where he was indendent w/ mobility. Pt was using a rollator to get around at facility. Pt states he was getting PT 5x a week at facility. Today Pt performed bed mobility w/ SBA. Pt then performed 75ft of gait w/ RW w/ CGA/SBA. Pt demo SOB during activty but able to recover after rest. Pt did not complain of chest pain during session. From a PT perspective pt has good gait mechanics and has good LE strength for functional mobility. No concerns noted. Pt able to return to assisted living when medically stable. PT signing off.    Anticipated Discharge Disposition (PT): assisted living

## 2025-02-06 NOTE — DISCHARGE SUMMARY
ED OBSERVATION PROGRESS/DISCHARGE SUMMARY    Date of Admission: 2/6/2025   LOS: 0 days   PCP: Deanna Saravia APRN    Final Diagnosis chronic CAD, normochromic normocytic anemia, syncope      Subjective     Hospital Outcome: Discharge    Dale Mann is an 84-year-old male with significant PMH of dementia, CAD/CABG/PCI who was admitted to the ED observation unit for further evaluation of intermittent chest pain ongoing for the past 5 months.  There are no precipitating or alleviating factors.  Pain is said to be in the center of his chest and nonradiating.  Patient did have a brief episode of LOC.  Apparently he had just taken a nitro and stood up to go to the bathroom precipitating the syncopal event.  He denies SOA or recent URI symptoms.    In the ED patient's hemoglobin was 10.6 was 11.63 months ago.  RVP negative.  Chest x-ray showed cardiomegaly and suspected vascular congestion.  ECG was sinus/60, no acute ST changes.  He did have a prolonged MO interval.  CT head and C-spine negative.  TTE echo performed 9/30/2024 showed LVEF at 49%, grade 2 diastolic dysfunction.  Mild to moderate mitral regurg.    2/6/2025.    9:29 AM.  Patient has been evaluated by cardiology.  He is not an interventional candidate.  He is to be managed medically.  His syncopal episode was felt to be due to taking nitro and attempting to stand.  Apparently he is taking nitroglycerin randomly.  He does not tolerate Imdur.  Nitro has been removed from his regimen.  Cardiology recommends taking metoprolol succinate 12.5 mg in the evening.  It is felt will be a more safe alternative for anginal relief.  It was also noted that the patient's hemoglobin has gone from 11.6- to 10.6-10.  We will do a Hemoccult.  If positive GI consult.  If negative recheck hemoglobin this afternoon.  If stable okay for the patient to go home.    10:04 AM.  Bedside FOBT negative.  Will recheck hemoglobin at 1 PM.    5:39 PM.  Repeat hemoglobin 10.4.   This is up from 10.0 this morning.  Hemoglobin appears to be stable.  Will DC home at this time.  Cardiology will schedule outpatient follow-up.  Will have the patient to follow-up with primary care in roughly 1 week's time for repeat evaluation and repeat blood work.  He has an appointment with cardiology on 3/28/2025 with Dr. Bhanu Stearns.            ROS:  General: no fevers, chills  Respiratory: no cough, dyspnea  Cardiovascular: Chest pain/syncope  Abdomen: No abdominal pain, nausea, vomiting, or diarrhea  Neurologic: No focal weakness    Objective   Physical Exam:  I have reviewed the vital signs.  Temp:  [97.2 °F (36.2 °C)-97.9 °F (36.6 °C)] 97.9 °F (36.6 °C)  Heart Rate:  [60-68] 68  Resp:  [16-18] 18  BP: (102-134)/(47-78) 122/54  General Appearance:    Alert, cooperative, no distress  Head:    Normocephalic, atraumatic  Eyes:    Sclerae anicteric  Neck:   Supple, no mass  Lungs: Clear to auscultation bilaterally, respirations unlabored  Heart: Regular rate and rhythm, S1 and S2 normal, no murmur, rub or gallop  Abdomen:  Soft, nontender, bowel sounds active, nondistended  Extremities: No clubbing, cyanosis, or edema to lower extremities  Pulses:  2+ and symmetric in distal lower extremities  Skin: No rashes   Neurologic: Oriented x3, Normal strength to extremities    Results Review:    I have reviewed the labs, radiology results and diagnostic studies.    Results from last 7 days   Lab Units 02/06/25  1525 02/06/25  0427   WBC 10*3/mm3  --  5.48   HEMOGLOBIN g/dL 10.4* 10.0*   HEMATOCRIT % 30.4* 29.1*   PLATELETS 10*3/mm3  --  109*     Results from last 7 days   Lab Units 02/06/25  0427 02/05/25  2303   SODIUM mmol/L 138 139   POTASSIUM mmol/L 4.6 4.4   CHLORIDE mmol/L 106 106   CO2 mmol/L 26.3 26.1   BUN mg/dL 17 17   CREATININE mg/dL 1.06 1.24   CALCIUM mg/dL 8.8 9.0   BILIRUBIN mg/dL  --  0.4   ALK PHOS U/L  --  101   ALT (SGPT) U/L  --  6   AST (SGOT) U/L  --  14   GLUCOSE mg/dL 104* 108*     Imaging  Results (Last 24 Hours)       Procedure Component Value Units Date/Time    CT Cervical Spine Without Contrast [[id number]] Collected: 02/06/25 0158     Updated: 02/06/25 0204    Narrative:      CT OF THE CERVICAL SPINE     HISTORY: Fall     COMPARISON: None available.     TECHNIQUE: Axial CT imaging was obtained through the cervical spine.  Coronal and sagittal reformatted images were obtained.     FINDINGS:  No acute fracture or subluxation of the cervical spine is seen. Images  through the lung apices do not demonstrate any acute abnormalities.  There is no prevertebral soft tissue swelling. There are bilateral  carotid calcifications. Canal stenosis appears to be most significant at  C3-C4 and C4-C5. Neuroforaminal narrowing is noted at multiple levels.  It is probably most significant at C3-C4 and C4-C5 bilaterally.       Impression:      No acute fracture or subluxation identified.     Radiation dose reduction techniques were utilized, including automated  exposure control and exposure modulation based on body size.        This report was finalized on 2/6/2025 2:01 AM by Dr. Leny Scott M.D on Workstation: [id number]       CT Head Without Contrast [[id number]] Collected: 02/06/25 0154     Updated: 02/06/25 0201    Narrative:      CT OF THE HEAD WITHOUT CONTRAST     HISTORY: Fall     COMPARISON: September 28, 2024     TECHNIQUE: Axial CT imaging was obtained through the brain. No IV  contrast was administered.     FINDINGS:  No acute intracranial hemorrhage is seen. There is diffuse atrophy.  There is periventricular and deep white matter microangiopathic change.  There is no midline shift or mass effect. The patient is status post  left frontal craniectomy, and there is underlying encephalomalacia noted  within the left frontal lobe. Mucosal thickening is noted within the  ethmoid and maxillary sinuses. Mastoid air cells are relatively  underpneumatized. This is particularly notable on the right. There  is  fluid within the left mastoid air cells, with some extension into the  left middle ear. Similar findings were present on the prior study. No  calvarial fracture is seen.       Impression:      No acute intracranial findings.     Radiation dose reduction techniques were utilized, including automated  exposure control and exposure modulation based on body size.        This report was finalized on 2/6/2025 1:57 AM by Dr. Leny Scott M.D on Workstation: [id number]       XR Chest 1 View [[id number]] Collected: 02/05/25 2328     Updated: 02/05/25 2336    Narrative:      SINGLE VIEW OF THE CHEST     HISTORY: Dementia     COMPARISON: September 28, 2024     FINDINGS:  There is cardiomegaly. There may be some vascular congestion. No  pneumothorax is seen. Patient is status post median sternotomy.  Extensive granulomatous calcifications are noted. There is chronic  scarring at the left lung base. No definite acute infiltrates are seen.       Impression:      Cardiomegaly with suspected vascular congestion.     This report was finalized on 2/5/2025 11:33 PM by Dr. Leny Scott M.D on Workstation: [id number]               I have reviewed the medications.     Discharge Medications        New Medications        Instructions Start Date   metoprolol succinate XL 25 MG 24 hr tablet  Commonly known as: TOPROL-XL   12.5 mg, Oral, Nightly             Continue These Medications        Instructions Start Date   albuterol sulfate  (90 Base) MCG/ACT inhaler  Commonly known as: PROVENTIL HFA;VENTOLIN HFA;PROAIR HFA   No dose, route, or frequency recorded.      aspirin 81 MG EC tablet   81 mg, Daily      atorvastatin 40 MG tablet  Commonly known as: LIPITOR   40 mg, Oral, Nightly      Cyanocobalamin 1000 MCG/ML kit   1,000 mcg, Weekly      Dexilant 60 MG capsule  Generic drug: dexlansoprazole   60 mg, Oral, Daily      folic acid 1 MG tablet  Commonly known as: FOLVITE   1 mg, Daily      melatonin 5 MG tablet  tablet   5 mg, Nightly PRN      polyethyl glycol-propyl glycol 0.4-0.3 % solution ophthalmic solution (artificial tears)  Commonly known as: SYSTANE   2 drops, Every 1 Hour PRN      polyethylene glycol 236 g solution  Commonly known as: GoLYTELY   Starting at noon on day prior to procedure, drink 8 ounces every 30 minutes until all gone or stools are clear. May add flavor packet.      prasugrel 10 MG tablet  Commonly known as: EFFIENT   10 mg, Oral, Daily      ranolazine 1000 MG 12 hr tablet  Commonly known as: RANEXA   TAKE ONE TABLET BY MOUTH TWO TIMES A DAY      simethicone 80 MG chewable tablet  Commonly known as: Gas-X   80 mg, Oral, Every 6 Hours PRN      tamsulosin 0.4 MG capsule 24 hr capsule  Commonly known as: FLOMAX   1 capsule, Nightly      Testosterone Cypionate 200 MG/ML injection  Commonly known as: DEPOTESTOTERONE CYPIONATE   200 mg, Intramuscular, Every 14 Days             Stop These Medications      nitroglycerin 0.4 MG SL tablet  Commonly known as: NITROSTAT             ---------------------------------------------------------------------------------------------  Assessment & Plan   Assessment/Problem List    Chest pain    CAD (coronary artery disease), native coronary artery    Normochromic normocytic anemia    Syncope      Plan:    Chest pain  Possible syncopal episode?  History of CAD status post CABG with stent  -initial troponin 20, repeat troponin of 21.    -Respiratory viral panel negative.   -Chest x-ray shows cardiomegaly with suspected vascular congestion.    -EKG shows sinus rhythm 60 bpm, no ST elevation apparent.  Prolonged VT interval.    -CT head shows no acute findings.    -CT cervical spine shows no acute findings.   -Patient given full dose aspirin in ED.  -Cardiology has evaluated with the plan above.  -Recommend patient follow-up with primary care in a week or 2 for repeat evaluation       Normocytic normochromic anemia  -Likely anemia of chronic disease/new  baseline  -Hemoglobin of 10.6 which was 11.6 about 3 months ago.    -Heme-negative stools  -Hemoglobin 10.4 and stable at time of discharge.       History of dementia  -Alert and oriented x 2-3 baseline.     Hyperlipidemia  -LDL 39, HDL 53, total cholesterol 106  -Continue home statin     BPH  -Continue home Flomax     GERD  -Continue home PPI        VTE Prophylaxis: SCDs  Mechanical VTE prophylaxis orders are present.    Disposition: Discharge    Follow-up after Discharge: Primary care, cardiology    This note will serve as a discharge summary    Raleigh Tony III, PA 02/06/25 17:55 EST    I have worn appropriate PPE during this patient encounter, sanitized my hands both with entering and exiting patient's room.      35 minutes has been spent by [facility] providers in the care of this patient while under observation status

## 2025-03-28 ENCOUNTER — OFFICE VISIT (OUTPATIENT)
Dept: CARDIOLOGY | Age: 85
End: 2025-03-28
Payer: MEDICARE

## 2025-03-28 VITALS
HEART RATE: 76 BPM | BODY MASS INDEX: 26.05 KG/M2 | DIASTOLIC BLOOD PRESSURE: 68 MMHG | HEIGHT: 70 IN | SYSTOLIC BLOOD PRESSURE: 130 MMHG | WEIGHT: 182 LBS

## 2025-03-28 DIAGNOSIS — I25.118 CORONARY ARTERY DISEASE OF NATIVE ARTERY OF NATIVE HEART WITH STABLE ANGINA PECTORIS: Primary | ICD-10-CM

## 2025-03-28 DIAGNOSIS — I25.2 OLD MYOCARDIAL INFARCTION: ICD-10-CM

## 2025-03-28 DIAGNOSIS — R00.1 BRADYCARDIA: ICD-10-CM

## 2025-03-28 DIAGNOSIS — I10 ESSENTIAL HYPERTENSION: ICD-10-CM

## (undated) DEVICE — SKIN AFFIX SURG ADHESIVE 72/CS 0.55ML: Brand: MEDLINE

## (undated) DEVICE — GLV SURG NEOLON 2G PF LF 6.5 STRL

## (undated) DEVICE — CANN SMPL SOFTECH BIFLO ETCO2 A/M 7FT

## (undated) DEVICE — SUT VIC 3/0 TIES 18IN J110T

## (undated) DEVICE — SUT VIC 2/0 SH 27IN

## (undated) DEVICE — SPNG GZ WOVN 4X4IN 12PLY 10/BX STRL

## (undated) DEVICE — SUT VIC 3/0 SH 27IN J416H

## (undated) DEVICE — GLV SURG SENSICARE GREEN W/ALOE PF LF 8 STRL

## (undated) DEVICE — GLV SURG SENSICARE GREEN W/ALOE PF LF 7 STRL

## (undated) DEVICE — DRN PENRS 1/2X18IN LTX

## (undated) DEVICE — DRSNG GZ PETROLTM CURAD NONADHR 1/2X72IN LF STRL

## (undated) DEVICE — BANDAGE,GAUZE,CONFORMING,1"X75",STRL,LF: Brand: MEDLINE

## (undated) DEVICE — SINGLE-USE BIOPSY FORCEPS: Brand: RADIAL JAW 4

## (undated) DEVICE — GLV SURG NEOLON 2G PF LF 7.5 STRL

## (undated) DEVICE — SUT PDS 0 CT2 27IN DYED Z334H

## (undated) DEVICE — ANTIBACTERIAL UNDYED BRAIDED (POLYGLACTIN 910), SYNTHETIC ABSORBABLE SUTURE: Brand: COATED VICRYL

## (undated) DEVICE — SOL IRR NACL 0.9PCT BT 1000ML

## (undated) DEVICE — SUT GUT CHRM 2/0 SH 27IN G123H

## (undated) DEVICE — GLV SURG SENSICARE GREEN W/ALOE PF LF 6 STRL

## (undated) DEVICE — GLV SURG TRIUMPH LT PF LTX 7.5 STRL

## (undated) DEVICE — GLV SURG TRIUMPH LT PF LTX 6 STRL

## (undated) DEVICE — GLV SURG TRIUMPH LT PF LTX 6.5 STRL

## (undated) DEVICE — PK MAJ PROC LF 60

## (undated) DEVICE — GLV SURG SENSICARE GREEN W/ALOE PF LF 6.5 STRL